# Patient Record
Sex: MALE | Race: WHITE | NOT HISPANIC OR LATINO | Employment: FULL TIME | URBAN - METROPOLITAN AREA
[De-identification: names, ages, dates, MRNs, and addresses within clinical notes are randomized per-mention and may not be internally consistent; named-entity substitution may affect disease eponyms.]

---

## 2017-01-03 DIAGNOSIS — F41.8 SITUATIONAL ANXIETY: ICD-10-CM

## 2017-01-03 RX ORDER — ALPRAZOLAM 0.25 MG/1
0.25 TABLET ORAL NIGHTLY PRN
Qty: 20 TAB | Refills: 0
Start: 2017-01-03

## 2017-01-03 NOTE — TELEPHONE ENCOUNTER
Was the patient seen in the last year in this department? Yes     Does patient have an active prescription for medications requested? No     Received Request Via: Pharmacy    '

## 2017-01-09 ENCOUNTER — HOSPITAL ENCOUNTER (OUTPATIENT)
Dept: LAB | Facility: MEDICAL CENTER | Age: 57
End: 2017-01-09
Attending: NURSE PRACTITIONER
Payer: COMMERCIAL

## 2017-01-09 DIAGNOSIS — Z95.1 S/P CABG (CORONARY ARTERY BYPASS GRAFT): ICD-10-CM

## 2017-01-09 DIAGNOSIS — E11.69 DYSLIPIDEMIA ASSOCIATED WITH TYPE 2 DIABETES MELLITUS (HCC): ICD-10-CM

## 2017-01-09 DIAGNOSIS — E78.5 DYSLIPIDEMIA ASSOCIATED WITH TYPE 2 DIABETES MELLITUS (HCC): ICD-10-CM

## 2017-01-09 DIAGNOSIS — I25.10 CORONARY ARTERY DISEASE INVOLVING NATIVE CORONARY ARTERY OF NATIVE HEART WITHOUT ANGINA PECTORIS: Chronic | ICD-10-CM

## 2017-01-09 LAB
ALBUMIN SERPL BCP-MCNC: 4.2 G/DL (ref 3.2–4.9)
ALBUMIN/GLOB SERPL: 1.8 G/DL
ALP SERPL-CCNC: 80 U/L (ref 30–99)
ALT SERPL-CCNC: 28 U/L (ref 2–50)
ANION GAP SERPL CALC-SCNC: 5 MMOL/L (ref 0–11.9)
AST SERPL-CCNC: 16 U/L (ref 12–45)
BILIRUB SERPL-MCNC: 0.4 MG/DL (ref 0.1–1.5)
BUN SERPL-MCNC: 9 MG/DL (ref 8–22)
CALCIUM SERPL-MCNC: 9.3 MG/DL (ref 8.5–10.5)
CHLORIDE SERPL-SCNC: 104 MMOL/L (ref 96–112)
CHOLEST SERPL-MCNC: 119 MG/DL (ref 100–199)
CO2 SERPL-SCNC: 29 MMOL/L (ref 20–33)
CREAT SERPL-MCNC: 0.92 MG/DL (ref 0.5–1.4)
ERYTHROCYTE [DISTWIDTH] IN BLOOD BY AUTOMATED COUNT: 41.4 FL (ref 35.9–50)
GLOBULIN SER CALC-MCNC: 2.4 G/DL (ref 1.9–3.5)
GLUCOSE SERPL-MCNC: 155 MG/DL (ref 65–99)
HCT VFR BLD AUTO: 44.6 % (ref 42–52)
HDLC SERPL-MCNC: 35 MG/DL
HGB BLD-MCNC: 14.9 G/DL (ref 14–18)
LDLC SERPL CALC-MCNC: 66 MG/DL
MCH RBC QN AUTO: 30.5 PG (ref 27–33)
MCHC RBC AUTO-ENTMCNC: 33.4 G/DL (ref 33.7–35.3)
MCV RBC AUTO: 91.2 FL (ref 81.4–97.8)
PLATELET # BLD AUTO: 283 K/UL (ref 164–446)
PMV BLD AUTO: 9.3 FL (ref 9–12.9)
POTASSIUM SERPL-SCNC: 4.2 MMOL/L (ref 3.6–5.5)
PROT SERPL-MCNC: 6.6 G/DL (ref 6–8.2)
RBC # BLD AUTO: 4.89 M/UL (ref 4.7–6.1)
SODIUM SERPL-SCNC: 138 MMOL/L (ref 135–145)
TRIGL SERPL-MCNC: 88 MG/DL (ref 0–149)
WBC # BLD AUTO: 7.7 K/UL (ref 4.8–10.8)

## 2017-01-09 PROCEDURE — 85027 COMPLETE CBC AUTOMATED: CPT

## 2017-01-09 PROCEDURE — 36415 COLL VENOUS BLD VENIPUNCTURE: CPT

## 2017-01-09 PROCEDURE — 80053 COMPREHEN METABOLIC PANEL: CPT

## 2017-01-09 PROCEDURE — 80061 LIPID PANEL: CPT

## 2017-01-12 ENCOUNTER — OFFICE VISIT (OUTPATIENT)
Dept: MEDICAL GROUP | Age: 57
End: 2017-01-12
Payer: COMMERCIAL

## 2017-01-12 ENCOUNTER — NON-PROVIDER VISIT (OUTPATIENT)
Dept: CARDIOLOGY | Facility: MEDICAL CENTER | Age: 57
End: 2017-01-12
Payer: COMMERCIAL

## 2017-01-12 VITALS
DIASTOLIC BLOOD PRESSURE: 76 MMHG | BODY MASS INDEX: 27.1 KG/M2 | TEMPERATURE: 97 F | OXYGEN SATURATION: 98 % | HEART RATE: 78 BPM | WEIGHT: 193.6 LBS | SYSTOLIC BLOOD PRESSURE: 112 MMHG | HEIGHT: 71 IN

## 2017-01-12 VITALS — WEIGHT: 191.5 LBS | BODY MASS INDEX: 26.81 KG/M2 | HEIGHT: 71 IN

## 2017-01-12 DIAGNOSIS — Z23 NEED FOR VACCINATION: ICD-10-CM

## 2017-01-12 DIAGNOSIS — Z95.1 S/P CABG (CORONARY ARTERY BYPASS GRAFT): ICD-10-CM

## 2017-01-12 DIAGNOSIS — F41.8 SITUATIONAL ANXIETY: ICD-10-CM

## 2017-01-12 DIAGNOSIS — I25.10 CORONARY ARTERY DISEASE INVOLVING NATIVE CORONARY ARTERY OF NATIVE HEART WITHOUT ANGINA PECTORIS: Chronic | ICD-10-CM

## 2017-01-12 DIAGNOSIS — E78.5 DYSLIPIDEMIA ASSOCIATED WITH TYPE 2 DIABETES MELLITUS (HCC): ICD-10-CM

## 2017-01-12 DIAGNOSIS — E11.69 DYSLIPIDEMIA ASSOCIATED WITH TYPE 2 DIABETES MELLITUS (HCC): ICD-10-CM

## 2017-01-12 PROCEDURE — G0422 INTENS CARDIAC REHAB W/EXERC: HCPCS | Performed by: CLINIC/CENTER

## 2017-01-12 PROCEDURE — 90746 HEPB VACCINE 3 DOSE ADULT IM: CPT | Performed by: PHYSICIAN ASSISTANT

## 2017-01-12 PROCEDURE — G0423 INTENS CARDIAC REHAB NO EXER: HCPCS | Mod: 59 | Performed by: CLINIC/CENTER

## 2017-01-12 PROCEDURE — 90471 IMMUNIZATION ADMIN: CPT | Performed by: PHYSICIAN ASSISTANT

## 2017-01-12 PROCEDURE — 99214 OFFICE O/P EST MOD 30 MIN: CPT | Mod: 25 | Performed by: PHYSICIAN ASSISTANT

## 2017-01-12 RX ORDER — ALPRAZOLAM 0.25 MG/1
0.25 TABLET ORAL NIGHTLY PRN
Qty: 20 TAB | Refills: 0 | Status: SHIPPED
Start: 2017-01-12 | End: 2017-04-13 | Stop reason: SDUPTHER

## 2017-01-12 ASSESSMENT — PATIENT HEALTH QUESTIONNAIRE - PHQ9
SUM OF ALL RESPONSES TO PHQ9 QUESTIONS 1 AND 2: 4
SUM OF ALL RESPONSES TO PHQ QUESTIONS 1-9: 16
SUM OF ALL RESPONSES TO PHQ QUESTIONS 1-9: 16
4. FEELING TIRED OR HAVING LITTLE ENERGY: 2
3. TROUBLE FALLING OR STAYING ASLEEP OR SLEEPING TOO MUCH: 2
5. POOR APPETITE OR OVEREATING: 2
8. MOVING OR SPEAKING SO SLOWLY THAT OTHER PEOPLE COULD HAVE NOTICED. OR THE OPPOSITE, BEING SO FIGETY OR RESTLESS THAT YOU HAVE BEEN MOVING AROUND A LOT MORE THAN USUAL: 2
2. FEELING DOWN, DEPRESSED, IRRITABLE, OR HOPELESS: 2
1. LITTLE INTEREST OR PLEASURE IN DOING THINGS: 2
6. FEELING BAD ABOUT YOURSELF - OR THAT YOU ARE A FAILURE OR HAVE LET YOURSELF OR YOUR FAMILY DOWN: 2
9. THOUGHTS THAT YOU WOULD BE BETTER OFF DEAD, OR OF HURTING YOURSELF: 0
7. TROUBLE CONCENTRATING ON THINGS, SUCH AS READING THE NEWSPAPER OR WATCHING TELEVISION: 2

## 2017-01-12 NOTE — Clinical Note
He is moderately depressed per survey. He feels this is situational. We will reassess at end of program but he may need further assessment from his primary care MD/ PA-KATHIE.

## 2017-01-12 NOTE — MR AVS SNAPSHOT
"        Hu Santacruz   2017 8:10 AM   Office Visit   MRN: [id number]    Department:  [address] [place] [address]   Dept Phone:  [phone]    Description:  Male : 1960   Provider:  Jen Ortiz PA-C           Reason for Visit     Diabetes     Heart Problem Pt states that he had Bypass surgery on 17    Anxiety           Allergies as of 2017     No Known Allergies      You were diagnosed with     Coronary artery disease involving native coronary artery of native heart without angina pectoris   [1452592]       S/P CABG (coronary artery bypass graft)   [004785]       Dyslipidemia associated with type 2 diabetes mellitus (HCC)   [458504]       Situational anxiety   [420684]       Need for vaccination   [171331]         Vital Signs     Blood Pressure Pulse Temperature Height Weight Body Mass Index    112/76 mmHg 78 36.1 °C (97 °F) 1.803 m (5' 10.98\") 87.816 kg (193 lb 9.6 oz) 27.01 kg/m2    Oxygen Saturation Smoking Status                98% Never Smoker           Basic Information     Date Of Birth Sex Race Ethnicity Preferred Language    1960 Male White Non- English      Your appointments     2017  1:00 PM   ORIENTATION with ICR RN   [facility] ([facility])    [address]  [address]  [place] [address] [phone]            Mar 28, 2017  7:45 AM   FOLLOW UP with Sri Olmstead M.D.   [facility] (--)    [address] [place] [address] [address]  [place] [id number] [phone]            2017  8:10 AM   Established Patient with Jen Ortiz PA-C   [facility] [address] [place] [address])    [address]  [place] [id number] [phone]           You will be receiving a confirmation call a few days before your appointment from our automated call confirmation system.              Problem List              ICD-10-CM Priority Class Noted - Resolved    CAD (coronary artery disease), native coronary " artery (Chronic) I25.10 Medium  4/16/2012 - Present    Dyslipidemia associated with type 2 diabetes mellitus (HCC) E11.69, E78.5 Medium  4/16/2012 - Present    Calcifying tendinitis of shoulder M75.30 Low  4/2/2013 - Present    Bicipital tenosynovitis M75.20 Low  4/2/2013 - Present    Erectile dysfunction N52.9 Low  2/24/2014 - Present    Situational anxiety F41.8 Low  10/11/2016 - Present    S/P CABG (coronary artery bypass graft) Z95.1 Medium  12/20/2016 - Present      Health Maintenance        Date Due Completion Dates    IMM HEP B VACCINE (1 of 3 - Primary Series) 1960 ---    RETINAL SCREENING 1/26/2016 1/26/2015    A1C SCREENING 6/5/2017 12/5/2016, 10/11/2016, 5/24/2016, 12/17/2015, 9/24/2015, 4/29/2015, 12/8/2014, 6/2/2014, 2/10/2014, 2/16/2013    URINE ACR / MICROALBUMIN 10/7/2017 10/7/2016, 9/24/2015, 2/10/2014, 2/16/2013    DIABETES MONOFILAMTENT / LE EXAM 10/11/2017 10/11/2016, 5/6/2015 (Done), 2/10/2014 (Done)    Override on 5/6/2015: Done    Override on 2/10/2014: Done    FASTING LIPID PROFILE 1/9/2018 1/9/2017, 10/7/2016, 9/24/2015, 12/8/2014, 2/10/2014, 2/16/2013    SERUM CREATININE 1/9/2018 1/9/2017, 12/11/2016, 12/10/2016, 12/9/2016, 12/8/2016, 12/7/2016, 12/5/2016, 11/19/2016, 11/15/2016, 10/7/2016, 5/24/2016, 12/17/2015, 9/24/2015, 4/29/2015, 12/8/2014, 2/10/2014 (Done), 2/10/2014, 3/14/2013, 2/16/2013    Override on 2/10/2014: Done    IMM DTaP/Tdap/Td Vaccine (2 - Td) 2/10/2024 2/10/2014    COLONOSCOPY 7/21/2024 7/21/2014            Current Immunizations     13-VALENT PCV PREVNAR 12/31/2014    Hepatitis B Vaccine Recombivax (Adol/Adult)  Incomplete    Influenza Vaccine Quad Inj (Preserved) 10/11/2016 12:20 PM, 12/31/2014    Pneumococcal polysaccharide vaccine (PPSV-23) 11/19/2016  8:59 AM    Tdap Vaccine 2/10/2014 10:11 AM      Below and/or attached are the medications your provider expects you to take. Review all of your home medications and newly ordered medications with your provider  and/or pharmacist. Follow medication instructions as directed by your provider and/or pharmacist. Please keep your medication list with you and share with your provider. Update the information when medications are discontinued, doses are changed, or new medications (including over-the-counter products) are added; and carry medication information at all times in the event of emergency situations     Allergies:  No Known Allergies          Medications  Valid as of: January 12, 2017 -  8:53 AM    Generic Name Brand Name Tablet Size Instructions for use    ALPRAZolam (Tab) XANAX 0.25 MG Take 1 Tab by mouth at bedtime as needed for Anxiety.        Aspirin (Tablet Delayed Response) ECOTRIN 81 MG Take 81 mg by mouth every day.        Cholecalciferol (Tab) cholecalciferol 1000 UNIT Take 1,000 Units by mouth 2 Times a Day.        Clopidogrel Bisulfate (Tab) PLAVIX 75 MG Take 1 Tab by mouth every day.        Cyclobenzaprine HCl (Tab) FLEXERIL 10 MG Take 10 mg by mouth 1 time daily as needed for Moderate Pain or Muscle Spasms. Pt is taking Roxicodone for post surgery pain and is not taking this yet since DC from CABG.  But he has it.        Diphenhydramine-APAP (sleep) (Tab) Diphenhydramine-APAP (sleep)  MG Take 2 Tabs by mouth 1 time daily as needed (sleep).        Dulaglutide (Solution Pen-injector) Dulaglutide 1.5 MG/0.5ML Inject 1 Each as instructed every 7 days. On Mon        Glimepiride (Tab) AMARYL 2 MG Take 2 mg by mouth every bedtime.        MetFORMIN HCl (Tab) GLUCOPHAGE 1000 MG Take 1,000 mg by mouth 2 times a day, with meals.        Metoprolol Tartrate (Tab) LOPRESSOR 25 MG Take 0.5 Tabs by mouth 2 times a day.        Rosuvastatin Calcium (Tab) CRESTOR 20 MG Take 1 Tab by mouth every bedtime.        .                 Medicines prescribed today were sent to:     [facility] [place] - [address]    [address] [place] [place] [address]    Phone: [phone] Fax: [phone]    [place] [address]  Hours?: No    [facility] - [place], [place] - [address] TO REGISTERED [facility] [address]    [address] [facility] [address]    Phone: [phone] Fax: [phone]    Open 24 Hours?: No      Medication refill instructions:       If your prescription bottle indicates you have medication refills left, it is not necessary to call your provider’s office. Please contact your pharmacy and they will refill your medication.    If your prescription bottle indicates you do not have any refills left, you may request refills at any time through one of the following ways: The online Firebase system (except Urgent Care), by calling your provider’s office, or by asking your pharmacy to contact your provider’s office with a refill request. Medication refills are processed only during regular business hours and may not be available until the next business day. Your provider may request additional information or to have a follow-up visit with you prior to refilling your medication.   *Please Note: Medication refills are assigned a new Rx number when refilled electronically. Your pharmacy may indicate that no refills were authorized even though a new prescription for the same medication is available at the pharmacy. Please request the medicine by name with the pharmacy before contacting your provider for a refill.        Other Notes About Your Plan     Social History: Adopted           Firebase Access Code: Activation code not generated  Current Firebase Status: Active

## 2017-01-12 NOTE — PROGRESS NOTES
Subjective:     Chief Complaint   Patient presents with   • Diabetes   • Heart Problem     Pt states that he had Bypass surgery on 1/16/17   • Anxiety     Hu Santacruz is a 56 y.o. male here today for evaluation and management of:    Coronary artery disease involving native coronary artery of native heart without angina pectoris/S/P CABG (coronary artery bypass graft)  Recent bypass-- feeling like he is recovering well.   Starts cardiac rehab today.  Feeling well.  Cardiology discontinued enalapril--questionable hypotension while taking it.  Tolerating ASA and Plavix.     Type 2 diabetes mellitus   Stable. Currently taking metformin 1000 mg BID, glimepiride 2 mg daily, and truliciy 1.5 mg/0.5 mg weekly as directed.  Denies side effects. Very rare hypoglycemic events--catches early  He is monitoring blood sugar regularly at home. Fasting and random blood [id number]. One to two times daily.  Reports diet is fair--plans to make improvements.  He is not exercising regularly--starts cardiac rehab today.  Last eye exam: 1/2015--questionable in 2016  Denies polyuria, polydipsia, blurred vision, or neuropathies.  Cardiology d/c'd Acei.  Has not followed up with new endo yet.    Cholesterol  Currently taking Crestor 20 mg HS as directed.  Denies side effects--no myalgias or abdominal pain.  He is taking ASA and Plavix daily.  He denies dizziness, claudication, or chest pain.    Situational anxiety  Requesting refill on Xanax.   Reports coping better with loss of mom, however, finds anxious after surgery.   Taking less than  prescribed. No longer on pain medications.   Denies depressed mood.   Denies SI/HI. (Denies wishing to be dead, thinking about death, intent to commit suicide)    ROS   Denies any recent fevers or chills.   No nausea or vomiting. No diarrhea.   No chest pains or shortness of breath.   No lower extremity edema.    No Known Allergies    Current medicines (including changes today)  Current  Outpatient Prescriptions   Medication Sig Dispense Refill   • alprazolam (XANAX) 0.25 MG Tab Take 1 Tab by mouth at bedtime as needed for Anxiety. 20 Tab 0   • rosuvastatin (CRESTOR) 20 MG Tab Take 1 Tab by mouth every bedtime. 90 Tab 3   • metoprolol (LOPRESSOR) 25 MG Tab Take 0.5 Tabs by mouth 2 times a day. 90 Tab 3   • Diphenhydramine-APAP, sleep, (TYLENOL PM EXTRA STRENGTH)  MG Tab Take 2 Tabs by mouth 1 time daily as needed (sleep).     • cyclobenzaprine (FLEXERIL) 10 MG Tab Take 10 mg by mouth 1 time daily as needed for Moderate Pain or Muscle Spasms. Pt is taking Roxicodone for post surgery pain and is not taking this yet since DC from CABG.  But he has it.     • clopidogrel (PLAVIX) 75 MG Tab Take 1 Tab by mouth every day. 30 Tab 2   • aspirin EC (ECOTRIN) 81 MG Tablet Delayed Response Take 81 mg by mouth every day.     • metformin (GLUCOPHAGE) 1000 MG tablet Take 1,000 mg by mouth 2 times a day, with meals.     • Dulaglutide (TRULICITY) 1.5 MG/0.5ML Solution Pen-injector Inject 1 Each as instructed every 7 days. On Mon     • glimepiride (AMARYL) 2 MG Tab Take 2 mg by mouth every bedtime.     • vitamin D (CHOLECALCIFEROL) 1000 UNIT TABS Take 1,000 Units by mouth 2 Times a Day.       No current facility-administered medications for this visit.       Patient Active Problem List    Diagnosis Date Noted   • S/P CABG (coronary artery bypass graft) 12/20/2016     Priority: Medium   • CAD (coronary artery disease), native coronary artery 04/16/2012     Priority: Medium   • Dyslipidemia associated with type 2 diabetes mellitus (HCC) 04/16/2012     Priority: Medium   • Situational anxiety 10/11/2016     Priority: Low   • Erectile dysfunction 02/24/2014     Priority: Low   • Calcifying tendinitis of shoulder 04/02/2013     Priority: Low   • Bicipital tenosynovitis 04/02/2013     Priority: Low       Family History   Problem Relation Age of Onset   • Adopted: Yes   • Family history unknown: Yes           "Objective:     Blood pressure 112/76, pulse 78, temperature 36.1 °C (97 °F), height 1.803 m (5' 10.98\"), weight 87.816 kg (193 lb 9.6 oz), SpO2 98 %. Body mass index is 27.01 kg/(m^2).     Physical Exam:  Gen: Well developed, well nourished in no acute distress.   Skin: Pink, warm, and dry  HEENT: conjunctiva non-injected, sclera non-icteric. EOMs intact.   Neck: Supple, trachea midline. No adenopathy or masses in the neck or supraclavicular regions.  Lungs: Effort is normal. Clear to auscultation bilaterally with good excursion.  CV: regular rate and rhythm.  Abdomen: soft, nontender, + BS. No HSM.    Ext: no edema, color normal, vascularity normal, temperature normal  Alert and oriented Eye contact is good, speech goal directed, affect calm    Lab Results   Component Value Date/Time    GLYCOHEMOGLOBIN 6.3* 12/05/2016 01:20 PM          Assessment and Plan:   The following treatment plan was discussed:     1. Coronary artery disease involving native coronary artery of native heart without angina pectoris - Stable. Continue current medicines. Monitor labs regularly. Follow-up with specialist as directed.    2. S/P CABG (coronary artery bypass graft) - Stable. Continue current medicines. Monitor labs regularly. Follow-up with specialist as directed.    3. Dyslipidemia associated with type 2 diabetes mellitus (HCC) -Stable. Continue current medicines. Monitor labs regularly.  - he will schedule follow-up with new endo.  HEPATITIS B VACCINE ADULT IM   4. Situational anxiety - refilled Xanax after reviweing . Advised limited use. Not to be mixed with ETOH or opiates.   Patient was counseled on risk of cognitive impairment and effects on driving and safety.  - will not fill long term. Pt verbalizes understanding this is last fill. alprazolam (XANAX) 0.25 MG Tab   5. Need for vaccination - Start Hep B series with  DM.   VIS given. Informed consent obtained. Vaccine administered in office.   HEPATITIS B VACCINE ADULT IM "     - HM: Due for retinal exam--declines POCT, will complete with his optometrist.  -Any change or worsening of signs or symptoms, patient encouraged to follow-up or report to emergency room for further evaluation. Patient understands and agrees.    Followup: Return in about 3 months (around 4/12/2017) for follow-up on cardiology/endo consults and anxiety.

## 2017-01-13 ENCOUNTER — TELEPHONE (OUTPATIENT)
Dept: CARDIOLOGY | Facility: MEDICAL CENTER | Age: 57
End: 2017-01-13

## 2017-01-13 NOTE — TELEPHONE ENCOUNTER
----- Message from HELENA Callejas sent at 1/10/2017  3:49 PM PST -----  Labs look great except blood sugar is elevated- needs FU with PCP regarding this. SC

## 2017-01-13 NOTE — TELEPHONE ENCOUNTER
Discussed lab results with pt. He has started ICR and will work on lifestyle modifications in rehab.

## 2017-01-14 NOTE — PROGRESS NOTES
Date: 1/14/2017    Initial Individual Treatment Plan review for the [facility] [place], operated by [facility] Intensive Cardiac [facility] (ICR) Program.    Hu Santacruz, 56 y.o. male, with qualifying diagnosis/diagnoses of S/P CABG. Co-morbid conditions include Hypertension, Hyperlipidemia, Diabetes, Stress, Age, Male > 45     Primary Care Provider: Jen Ortiz PA-C    Heart Specialist (s): CHARISSE Olmstead    Patient is just now enrolling in ICR.    Patient is an excellent candidate for the [facility] ([facility]) [facility].    I will review the Individual Treatment Plan again at 30 day post-initiation of ICR.    Galileo Castellon MD, [facility]  , [facility] ([facility]CR) [facility]

## 2017-01-16 ENCOUNTER — NON-PROVIDER VISIT (OUTPATIENT)
Dept: CARDIOLOGY | Facility: MEDICAL CENTER | Age: 57
End: 2017-01-16
Payer: COMMERCIAL

## 2017-01-16 VITALS
HEART RATE: 89 BPM | DIASTOLIC BLOOD PRESSURE: 67 MMHG | BODY MASS INDEX: 26.74 KG/M2 | SYSTOLIC BLOOD PRESSURE: 109 MMHG | HEIGHT: 71 IN | WEIGHT: 191 LBS

## 2017-01-16 DIAGNOSIS — Z95.1 S/P CABG (CORONARY ARTERY BYPASS GRAFT): ICD-10-CM

## 2017-01-16 PROCEDURE — G0423 INTENS CARDIAC REHAB NO EXER: HCPCS | Mod: 59 | Performed by: CLINIC/CENTER

## 2017-01-16 PROCEDURE — G0422 INTENS CARDIAC REHAB W/EXERC: HCPCS | Performed by: CLINIC/CENTER

## 2017-01-18 ENCOUNTER — NON-PROVIDER VISIT (OUTPATIENT)
Dept: CARDIOLOGY | Facility: MEDICAL CENTER | Age: 57
End: 2017-01-18
Payer: COMMERCIAL

## 2017-01-18 VITALS
HEIGHT: 71 IN | HEART RATE: 93 BPM | DIASTOLIC BLOOD PRESSURE: 65 MMHG | SYSTOLIC BLOOD PRESSURE: 123 MMHG | WEIGHT: 190 LBS | BODY MASS INDEX: 26.6 KG/M2

## 2017-01-18 DIAGNOSIS — Z95.1 S/P CABG (CORONARY ARTERY BYPASS GRAFT): ICD-10-CM

## 2017-01-18 PROCEDURE — G0422 INTENS CARDIAC REHAB W/EXERC: HCPCS | Performed by: CLINIC/CENTER

## 2017-01-18 PROCEDURE — G0423 INTENS CARDIAC REHAB NO EXER: HCPCS | Mod: 59 | Performed by: CLINIC/CENTER

## 2017-01-20 ENCOUNTER — NON-PROVIDER VISIT (OUTPATIENT)
Dept: CARDIOLOGY | Facility: MEDICAL CENTER | Age: 57
End: 2017-01-20
Payer: COMMERCIAL

## 2017-01-20 VITALS
HEIGHT: 71 IN | BODY MASS INDEX: 26.6 KG/M2 | SYSTOLIC BLOOD PRESSURE: 98 MMHG | DIASTOLIC BLOOD PRESSURE: 62 MMHG | WEIGHT: 190 LBS | HEART RATE: 88 BPM

## 2017-01-20 DIAGNOSIS — Z95.1 S/P CABG (CORONARY ARTERY BYPASS GRAFT): ICD-10-CM

## 2017-01-20 PROCEDURE — G0422 INTENS CARDIAC REHAB W/EXERC: HCPCS | Performed by: CLINIC/CENTER

## 2017-01-20 PROCEDURE — G0423 INTENS CARDIAC REHAB NO EXER: HCPCS | Mod: 59 | Performed by: CLINIC/CENTER

## 2017-01-20 NOTE — PROGRESS NOTES
Hu Santacruz attending cooking school.  Today  prepared quinoa and spice mix.    Patient received handouts and nutrition information regarding the specific recipes.

## 2017-01-27 ENCOUNTER — NON-PROVIDER VISIT (OUTPATIENT)
Dept: CARDIOLOGY | Facility: MEDICAL CENTER | Age: 57
End: 2017-01-27
Payer: COMMERCIAL

## 2017-01-27 VITALS
HEART RATE: 95 BPM | HEIGHT: 71 IN | WEIGHT: 188.5 LBS | BODY MASS INDEX: 26.39 KG/M2 | DIASTOLIC BLOOD PRESSURE: 72 MMHG | SYSTOLIC BLOOD PRESSURE: 107 MMHG

## 2017-01-27 DIAGNOSIS — Z95.1 S/P CABG (CORONARY ARTERY BYPASS GRAFT): ICD-10-CM

## 2017-01-27 PROCEDURE — G0422 INTENS CARDIAC REHAB W/EXERC: HCPCS | Performed by: CLINIC/CENTER

## 2017-01-27 PROCEDURE — G0423 INTENS CARDIAC REHAB NO EXER: HCPCS | Mod: 59 | Performed by: CLINIC/CENTER

## 2017-01-27 NOTE — PROGRESS NOTES
Hu Santacruz attending cooking school.  Today  prepared oatmeal supreme.    Patient received handouts and nutrition information regarding the specific recipes.

## 2017-02-06 ENCOUNTER — OFFICE VISIT (OUTPATIENT)
Dept: ENDOCRINOLOGY | Facility: MEDICAL CENTER | Age: 57
End: 2017-02-06
Payer: COMMERCIAL

## 2017-02-06 ENCOUNTER — APPOINTMENT (OUTPATIENT)
Dept: CARDIOLOGY | Facility: MEDICAL CENTER | Age: 57
End: 2017-02-06
Payer: COMMERCIAL

## 2017-02-06 VITALS
HEART RATE: 92 BPM | OXYGEN SATURATION: 95 % | BODY MASS INDEX: 26.74 KG/M2 | WEIGHT: 191 LBS | HEIGHT: 71 IN | SYSTOLIC BLOOD PRESSURE: 102 MMHG | DIASTOLIC BLOOD PRESSURE: 70 MMHG

## 2017-02-06 DIAGNOSIS — I25.10 CORONARY ARTERY DISEASE INVOLVING NATIVE CORONARY ARTERY OF NATIVE HEART WITHOUT ANGINA PECTORIS: Chronic | ICD-10-CM

## 2017-02-06 DIAGNOSIS — E11.69 TYPE 2 DIABETES MELLITUS WITH HYPERLIPIDEMIA (HCC): ICD-10-CM

## 2017-02-06 DIAGNOSIS — E78.5 DYSLIPIDEMIA ASSOCIATED WITH TYPE 2 DIABETES MELLITUS (HCC): ICD-10-CM

## 2017-02-06 DIAGNOSIS — R53.83 OTHER FATIGUE: ICD-10-CM

## 2017-02-06 DIAGNOSIS — E11.69 DYSLIPIDEMIA ASSOCIATED WITH TYPE 2 DIABETES MELLITUS (HCC): ICD-10-CM

## 2017-02-06 DIAGNOSIS — E78.5 TYPE 2 DIABETES MELLITUS WITH HYPERLIPIDEMIA (HCC): ICD-10-CM

## 2017-02-06 PROBLEM — E11.9 DM (DIABETES MELLITUS) (HCC): Status: ACTIVE | Noted: 2017-02-06

## 2017-02-06 LAB
HBA1C MFR BLD: 5.7 % (ref ?–5.8)
INT CON NEG: NORMAL
INT CON POS: NORMAL

## 2017-02-06 PROCEDURE — 99214 OFFICE O/P EST MOD 30 MIN: CPT | Mod: 25 | Performed by: PHYSICIAN ASSISTANT

## 2017-02-06 PROCEDURE — 83036 HEMOGLOBIN GLYCOSYLATED A1C: CPT | Performed by: PHYSICIAN ASSISTANT

## 2017-02-06 NOTE — MR AVS SNAPSHOT
"        Hu Santacruz   2017 2:40 PM   Office Visit   MRN: [id number]    Department:  [facility]   Dept Phone:  [phone]    Description:  Male : 1960   Provider:  Kelvin Turcios PA-C           Reason for Visit     Establish Care new to [facility]      Allergies as of 2017     No Known Allergies      You were diagnosed with     Dyslipidemia associated with type 2 diabetes mellitus (CMS-[facility])   [317420]       Coronary artery disease involving native coronary artery of native heart without angina pectoris   [8595571]       Type 2 diabetes mellitus with hyperlipidemia (CMS-[facility])   [2026557]         Vital Signs     Blood Pressure Pulse Height Weight Body Mass Index Oxygen Saturation    102/70 mmHg 92 1.803 m (5' 10.98\") 86.637 kg (191 lb) 26.65 kg/m2 95%    Smoking Status                   Never Smoker            Basic Information     Date Of Birth Sex Race Ethnicity Preferred Language    1960 Male White Non- English      Your appointments     2017  8:00 AM   GROUP EDUCATION with ICR EDUCATION RM   [facility] ([facility])    [address]  [place] [address]   [phone]            2017  9:00 AM   EXERCISE with ICR RN   [facility] ([facility])    [address]  [place] [address]   [phone]            Feb 10, 2017  8:00 AM   GROUP EDUCATION with ICR EDUCATION RM   [facility] ([facility])    [address]  [place] [address]   [phone]            Feb 10, 2017  9:00 AM   EXERCISE with ICR RN   [facility] ([facility])    [address]  [place] [address]   [phone]            2017  8:00 AM   GROUP EDUCATION with ICR EDUCATION RM   [facility] ([facility])    [address]  [facility] [place] [address]   [phone]            2017  9:00 AM   EXERCISE with " ICR RN   [facility] ([facility])    [address]  [place] [address]   [phone]            Feb 15, 2017  8:00 AM   GROUP EDUCATION with ICR EDUCATION RM   [facility] ([facility])    [address]  [place] [address]   [phone]            Feb 15, 2017  9:00 AM   EXERCISE with ICR RN   [facility] ([facility])    [address]  [place] [address]   [phone]            Feb 17, 2017  8:00 AM   GROUP EDUCATION with ICR EDUCATION RM   [facility] ([facility])    [address]  [address] [place] [address]   [phone]            Feb 17, 2017  9:00 AM   EXERCISE with ICR RN   [facility] ([facility])    [address] [place] [address]   [phone]            Feb 22, 2017  8:00 AM   GROUP EDUCATION with ICR EDUCATION RM   [facility] ([facility])    [address]  [address] [place] [address]   [phone]            Feb 22, 2017  9:00 AM   EXERCISE with ICR RN   [facility] ([facility])    [address]  [address] [place] [address]   [phone]            Feb 24, 2017  8:00 AM   GROUP EDUCATION with ICR EDUCATION RM   [facility] ([facility])    [address]  [address] [place] [address]   [phone]            Feb 24, 2017  9:00 AM   EXERCISE with ICR RN   [facility] ([facility])    [address]  [address] [place] [address]   [phone]            Feb 27, 2017  8:00 AM   GROUP EDUCATION with ICR EDUCATION RM   [facility] ([facility])    [address]  [place] [address]   [phone]            Feb 27, 2017  9:00 AM   EXERCISE with LEÓN RN   [facility] ([facility])    [address]  [address]  [place] [address] [phone]             Mar 01, 2017  8:00 AM   GROUP EDUCATION with ICR EDUCATION RM   [facility] ([facility])    [address]  [place] [address]   [phone]            Mar 01, 2017  9:00 AM   EXERCISE with ICR RN   [facility] ([facility])    [address]  [place] [address]   [phone]            Mar 03, 2017  8:00 AM   GROUP EDUCATION with ICR EDUCATION RM   [facility] ([facility])    [address]  [place] [address]   [phone]            Mar 03, 2017  9:00 AM   EXERCISE with ICR RN   [facility] ([facility])    [address]  [place] [address]   [phone]            Mar 06, 2017  8:00 AM   GROUP EDUCATION with ICR EDUCATION RM   [facility] ([facility])    [address]  [place] [address]   [phone]            Mar 06, 2017  9:00 AM   EXERCISE with ICR RN   [facility] ([facility])    [address]  [place] [address]   [phone]            Mar 08, 2017  8:00 AM   GROUP EDUCATION with ICR EDUCATION RM   [facility] ([facility])    [address]  [place] [address]   [phone]            Mar 08, 2017  9:00 AM   EXERCISE with ICR RN   [facility] ([facility])    [address]  [place] [address]   [phone]            Mar 10, 2017  8:00 AM   GROUP EDUCATION with ICR EDUCATION RM   [facility] ([facility])    [address]  [place] [address]   [phone]            Mar 10, 2017  9:00 AM   EXERCISE with ICR RN   [facility] ([facility])    [address]  [place] [address]   [phone]            Mar 13, 2017  8:00 AM   GROUP EDUCATION with [facility] EDUCATION [facility] ([facility])    [address]  [place] [address]   [phone]            Mar 13, 2017  9:00 AM   EXERCISE with ICR RN   [facility] ([facility])    [address]  [place] [address]   [phone]            Mar 15, 2017  8:00 AM   GROUP EDUCATION with ICR EDUCATION RM   [facility] ([facility])    [address]  [place] [address]   [phone]            Mar 15, 2017  9:00 AM   EXERCISE with ICR RN   [facility] ([facility])    [address]  [address] [facility]  [place] [address]   [phone]            Mar 17, 2017  8:00 AM   GROUP EDUCATION with ICR EDUCATION RM   [facility] ([facility])    [address]  [address] [facility]  [place] [address]   [phone]            Mar 17, 2017  9:00 AM   EXERCISE with ICR RN   [facility] ([facility])    [address]  [address] [facility]  [place] [address]   [phone]            Mar 20, 2017  8:00 AM   GROUP EDUCATION with ICR EDUCATION RM   [facility] ([facility])    [address]  [address] [facility]  [place] [address]   [phone]            Mar 20, 2017  9:00 AM   EXERCISE with ICR RN   [facility] ([facility])    [address]  [address] [place] [address]   [phone]            Mar 22, 2017  8:00 AM   GROUP EDUCATION with ICR EDUCATION RM   [facility] ([facility])    [address]  [address] [place] [address]   [phone]            Mar 22, 2017  9:00 AM   EXERCISE with ICR RN   [facility] ([facility])    [address]  [address] [facility]  [place] [address]   [phone]            Mar 24, 2017  8:00 AM   GROUP EDUCATION with ICR EDUCATION RM   [facility] ([facility])    [address]  [address] [place] [address]   [phone]            Mar 24, 2017  9:00 AM   EXERCISE with ICR RN   [facility] ([facility])    [address]  [place] [address]   [phone]            Mar 27, 2017  8:00 AM   GROUP EDUCATION with ICR EDUCATION RM   [facility] ([facility])    [address]  [place] [address]   [phone]            Mar 27, 2017  9:00 AM   EXERCISE with ICR RN   [facility] ([facility])    [address]  [place] [address]   [phone]            Mar 28, 2017  7:45 AM   FOLLOW UP with Sri Olmstead M.D.   [facility] (--)    [address]  [place] [address]   [phone]            Mar 29, 2017  8:00 AM   GROUP EDUCATION with ICR EDUCATION RM   [facility] ([facility])    [address]  [place] [address]   [phone]            Mar 29, 2017  9:00 AM   EXERCISE with ICR RN   [facility] ([facility])    [address]  [place] [address]   [phone]            Mar 31, 2017  8:00 AM   GROUP EDUCATION with ICR EDUCATION RM   [facility] ([facility])    [address]  [place] [address]   [phone]            Mar 31, 2017  9:00 AM   EXERCISE with ICR RN   [facility] ([facility])    [address]  [place] [address]   [phone]            Apr 03, 2017  8:00 AM   GROUP EDUCATION with ICR EDUCATION RM   [facility] ([facility])    [address]  [place] [address]   [phone]            Apr 03, 2017  9:00 AM   EXERCISE with ICR RN   [facility] ([facility])    [address]  [place] [address]   [phone]            Apr 05, 2017  8:00 AM   GROUP EDUCATION with ICR EDUCATION RM   [facility] ([facility])    [address]  [facility] [address] [phone]            Apr  05, 2017  9:00 AM   EXERCISE with ICR RN   [facility] ([facility])    [address]  [place] [address] [phone]            Apr 13, 2017  8:10 AM   Established Patient with Jen Ortiz PA-C   [facility] [address] ([facility]    [address]  [place] [address] [phone]           You will be receiving a confirmation call a few days before your appointment from our automated call confirmation system.            Aug 07, 2017  9:00 AM   Established Patient with [facility] ([facility])    [address]  [place] [address] [phone]           You will be receiving a confirmation call a few days before your appointment from our automated call confirmation system.              Problem List              ICD-10-CM Priority Class Noted - Resolved    CAD (coronary artery disease), native coronary artery (Chronic) I25.10 Medium  4/16/2012 - Present    Dyslipidemia associated with type 2 diabetes mellitus (CMS-HCC) E11.69, E78.5 Medium  4/16/2012 - Present    Calcifying tendinitis of shoulder M75.30 Low  4/2/2013 - Present    Bicipital tenosynovitis M75.20 Low  4/2/2013 - Present    Erectile dysfunction N52.9 Low  2/24/2014 - Present    Situational anxiety F41.8 Low  10/11/2016 - Present    S/P CABG (coronary artery bypass graft) Z95.1 Medium  12/20/2016 - Present    DM (diabetes mellitus) (CMS-HCC) E11.9   2/6/2017 - Present    Type 2 diabetes mellitus with hyperlipidemia (CMS-HCC) E11.69, E78.5   2/6/2017 - Present      Health Maintenance        Date Due Completion Dates    RETINAL SCREENING 1/26/2016 1/26/2015    IMM HEP B VACCINE (2 of 3 - Primary Series) 2/9/2017 1/12/2017    A1C SCREENING 6/5/2017 12/5/2016, 10/11/2016, 5/24/2016, 12/17/2015, 9/24/2015, 4/29/2015, 12/8/2014, 6/2/2014, 2/10/2014, 2/16/2013    URINE ACR / MICROALBUMIN 10/7/2017 10/7/2016, 9/24/2015, 2/10/2014,  2/16/2013    DIABETES MONOFILAMENT / LE EXAM 10/11/2017 10/11/2016, 5/6/2015 (Done), 2/10/2014 (Done)    Override on 5/6/2015: Done    Override on 2/10/2014: Done    FASTING LIPID PROFILE 1/9/2018 1/9/2017, 10/7/2016, 9/24/2015, 12/8/2014, 2/10/2014, 2/16/2013    SERUM CREATININE 1/9/2018 1/9/2017, 12/11/2016, 12/10/2016, 12/9/2016, 12/8/2016, 12/7/2016, 12/5/2016, 11/19/2016, 11/15/2016, 10/7/2016, 5/24/2016, 12/17/2015, 9/24/2015, 4/29/2015, 12/8/2014, 2/10/2014 (Done), 2/10/2014, 3/14/2013, 2/16/2013    Override on 2/10/2014: Done    IMM DTaP/Tdap/Td Vaccine (2 - Td) 2/10/2024 2/10/2014    COLONOSCOPY 7/21/2024 7/21/2014            Current Immunizations     13-VALENT PCV PREVNAR 12/31/2014    Hepatitis B Vaccine Recombivax (Adol/Adult) 1/12/2017  9:16 AM    Influenza Vaccine Quad Inj (Preserved) 10/11/2016 12:20 PM, 12/31/2014    Pneumococcal polysaccharide vaccine (PPSV-23) 11/19/2016  8:59 AM    Tdap Vaccine 2/10/2014 10:11 AM      Below and/or attached are the medications your provider expects you to take. Review all of your home medications and newly ordered medications with your provider and/or pharmacist. Follow medication instructions as directed by your provider and/or pharmacist. Please keep your medication list with you and share with your provider. Update the information when medications are discontinued, doses are changed, or new medications (including over-the-counter products) are added; and carry medication information at all times in the event of emergency situations     Allergies:  No Known Allergies          Medications  Valid as of: February 06, 2017 -  3:26 PM    Generic Name Brand Name Tablet Size Instructions for use    ALPRAZolam (Tab) XANAX 0.25 MG Take 1 Tab by mouth at bedtime as needed for Anxiety.        Aspirin (Tablet Delayed Response) ECOTRIN 81 MG Take 81 mg by mouth every day.        Cholecalciferol (Tab) cholecalciferol 1000 UNIT Take 1,000 Units by mouth 2 Times a Day.         Clopidogrel Bisulfate (Tab) PLAVIX 75 MG Take 1 Tab by mouth every day.        Cyclobenzaprine HCl (Tab) FLEXERIL 10 MG Take 10 mg by mouth 1 time daily as needed for Moderate Pain or Muscle Spasms. Pt is taking Roxicodone for post surgery pain and is not taking this yet since DC from CABG.  But he has it.        Diphenhydramine-APAP (sleep) (Tab) Diphenhydramine-APAP (sleep)  MG Take 2 Tabs by mouth 1 time daily as needed (sleep).        Dulaglutide (Solution Pen-injector) Dulaglutide 1.5 MG/0.5ML Inject 1 Each as instructed every 7 days. On Mon        MetFORMIN HCl (Tab) GLUCOPHAGE 1000 MG Take 1,000 mg by mouth 2 times a day, with meals.        Metoprolol Tartrate (Tab) LOPRESSOR 25 MG Take 0.5 Tabs by mouth 2 times a day.        Rosuvastatin Calcium (Tab) CRESTOR 20 MG Take 1 Tab by mouth every bedtime.        .                 Medicines prescribed today were sent to:     [facility] [address] [place] [address] [place] - [address]    [address] [facility] [address]    Phone: [phone] Fax: [phone]    Open 24 Hours?: No    [facility] - [place], [place] - [address] AT [address] TO [facility]    [address] [facility] [address]    Phone: [phone] Fax: [phone]    Open 24 Hours?: No      Medication refill instructions:       If your prescription bottle indicates you have medication refills left, it is not necessary to call your provider’s office. Please contact your pharmacy and they will refill your medication.    If your prescription bottle indicates you do not have any refills left, you may request refills at any time through one of the following ways: The online [facility] Portal system (except Urgent Care), by calling your provider’s office, or by asking your pharmacy to contact your provider’s office with a refill request. Medication refills are processed only during regular business hours and may not be available until the next business day. Your  provider may request additional information or to have a follow-up visit with you prior to refilling your medication.   *Please Note: Medication refills are assigned a new Rx number when refilled electronically. Your pharmacy may indicate that no refills were authorized even though a new prescription for the same medication is available at the pharmacy. Please request the medicine by name with the pharmacy before contacting your provider for a refill.        Instructions    Stop Amaryl 2mg green pill at night.         Other Notes About Your Plan     Social History: Adopted           [employer] Access Code: Activation code not generated  Current [employer] Status: Active

## 2017-02-08 ENCOUNTER — NON-PROVIDER VISIT (OUTPATIENT)
Dept: CARDIOLOGY | Facility: MEDICAL CENTER | Age: 57
End: 2017-02-08
Payer: COMMERCIAL

## 2017-02-08 VITALS
HEART RATE: 87 BPM | BODY MASS INDEX: 26.6 KG/M2 | HEIGHT: 71 IN | DIASTOLIC BLOOD PRESSURE: 65 MMHG | SYSTOLIC BLOOD PRESSURE: 92 MMHG | WEIGHT: 190 LBS

## 2017-02-08 DIAGNOSIS — Z95.1 S/P CABG (CORONARY ARTERY BYPASS GRAFT): ICD-10-CM

## 2017-02-08 PROCEDURE — G0422 INTENS CARDIAC REHAB W/EXERC: HCPCS | Performed by: CLINIC/CENTER

## 2017-02-08 PROCEDURE — G0423 INTENS CARDIAC REHAB NO EXER: HCPCS | Mod: 59 | Performed by: CLINIC/CENTER

## 2017-02-08 ASSESSMENT — PAIN SCALES - GENERAL: PAINLEVEL: NO PAIN

## 2017-02-10 ENCOUNTER — NON-PROVIDER VISIT (OUTPATIENT)
Dept: CARDIOLOGY | Facility: MEDICAL CENTER | Age: 57
End: 2017-02-10

## 2017-02-10 DIAGNOSIS — Z95.1 S/P CABG (CORONARY ARTERY BYPASS GRAFT): ICD-10-CM

## 2017-02-10 DIAGNOSIS — I25.10 CORONARY ARTERY DISEASE INVOLVING NATIVE CORONARY ARTERY OF NATIVE HEART WITHOUT ANGINA PECTORIS: Chronic | ICD-10-CM

## 2017-02-13 ENCOUNTER — NON-PROVIDER VISIT (OUTPATIENT)
Dept: CARDIOLOGY | Facility: MEDICAL CENTER | Age: 57
End: 2017-02-13

## 2017-02-15 NOTE — PROGRESS NOTES
Cardiac Rehab Individual Treatment Plan Assessment: 30 day 02/15/2017 Session #     6   MRN: [id number]   Allergies: Review of patient's allergies indicates no known allergies.   Patient Name: Hu Santacruz : 1960 Risk Stratification: High    Diagnoses: S/P CABG  Age: 56 y.o. Physician: Jen Ortiz PA-C    Date of Event: 16 Specialist: Dr. CHARISSE Olmstead M.D.   Risk Factors:  Hypertension, Hyperlipidemia, Diabetes, Stress, Age, Male > 45   Exercise Nutrition Education Psychosocial   Stages of change Stages of change Stages of change Stages of change   Preparation Preparation Preparation Preparation   Fitness Test Lipids Learning Barriers Outcome Survey Tools   DIST:  (NA)  Available: No new Learning Barriers: Vision FP QOL Overall Score:  (NA)   Max HR:  (NA) Date: 17 Family Support PHQ-9:  (NA)   RPE:  (NA) Total:  (NA) Yes Nutrition Screen:  (NA)   SPO2:  (NA) Trig:  (NA) Lives: Spouse Intervention   MET:  (NA) HDL:  (NA) Tobacco Use Behavioral Health Consult: Yes   EF= 45 LDL:  (NA) History   Smoking status   • Never Smoker    Smokeless tobacco   • Never Used      Physician Referral: Yes   Ambulatory Status Diabetes Intervention Identifies Stressors: Yes   Fall Risk Assessed: Yes Diabetes: Yes Smoking Cessation Referral: N/A Drug Intervention: Yes   Exercise Ambulatory Status Assist Devices: None HbA1C: 5.7 % Date: 17 Ind. Education / Counseling: N/A Education   Exercise Prescription Monitors BS at Home: Yes Tobacco Adjunct: N/A Psychosocial Education: Coping Techniques, Positive Support System, S/S Depression   Mode: Treadmill, NuStep, Airdyne, UBE   Frequency: 1-2 x day Random BS: 155 (2017) Education Target Goal   Frequency:  3 days/week Weight Management Healthy Heart Education: Class Schedule Given, Cooking School Attended, Medications Reviewed, Patient Education Binder Provided, Videos Viewed per Shamir, Risk Factors Discussed, Workshops Attended (RD 1:1 Scheduled )  "Assess presence or absence of depression using a valid screening: No (Assessed Pre and Post program )   Duration:  15-35min Weight:  (not here) Target Goal Use Stress Management: Yes   Intensity:  11-13 RPE Height: 180.3 cm (5' 10.98\") Complete Tobacco Cessation: Complete Tobacco Cessation: N/A Adverse Events: Yes   METS:  3.0-5.0   Educate / Review and have understanding of cardiac disease prevention: Educate / Review and have understanding of cardiac disease prevention: Yes Unexpected Events: Yes   Progression:  ^ increments of 1-3 to THR/RPE as tolerated Goal weight: Lose 20lb  Medication Compliance: Yes    THR: THR: 20-30 BPM ^Resting HR History   Alcohol Use   • 3.0 oz/week   • 6 Cans of beer per week     Comment: 2 per week         Angina with Exercise?  Angina with Exercise: No      Resistance Training?  Resistance Training: Yes      Hypertension      Diagnosed with HTN: Yes Intervention      Resting BP:  (Not Here) Dietician Consult/Class: Yes      Peak Ex BP:  (NA) Nurse/Patient Discussion: Yes     Intervention Diabetes Ed Referral: Yes     Home Exercise:  Yes Discuss Maintenance /Wt Loss: Yes     Mode: Walk Attended Cooking School: Yes     Duration: 15 min + 1-2min up to 30min Dietary Goal: 24% fat; low sodium     Frequency: 7 days active  Education     Education Nutrition Education: Healthy Eating, Sodium Reduction, S&S Hypo/Hyper glycemia, Relate Diabetes to CAD     Equipment Orientation, Exercise Safety, S/S to Report, RPE Scale, Warm Up / Cool Down, Physically Active Target Goal     Target Goal LDL-C < 100 if trig. > 200:  No       Start Individual Exercise Rx Yes LDL-C < 70 for high risk patient:  Yes       BP < 140/90 or < 130/80 if DM or CKD Yes Non HDL-C Should be < 130:  Yes       Aerobic activity 30 + min / day 5 days / wk Yes HbA1C < 7%: Yes         BMI < 25: Yes       Notes: Patient has been here once since 1/27/2017 due to a death in the family. We will reassess when he returns.               "

## 2017-02-22 ENCOUNTER — NON-PROVIDER VISIT (OUTPATIENT)
Dept: CARDIOLOGY | Facility: MEDICAL CENTER | Age: 57
End: 2017-02-22
Payer: COMMERCIAL

## 2017-02-22 VITALS
DIASTOLIC BLOOD PRESSURE: 52 MMHG | BODY MASS INDEX: 26.81 KG/M2 | SYSTOLIC BLOOD PRESSURE: 107 MMHG | HEIGHT: 71 IN | HEART RATE: 75 BPM | WEIGHT: 191.5 LBS

## 2017-02-22 DIAGNOSIS — Z95.1 S/P CABG (CORONARY ARTERY BYPASS GRAFT): ICD-10-CM

## 2017-02-22 PROCEDURE — G0423 INTENS CARDIAC REHAB NO EXER: HCPCS | Mod: 59 | Performed by: CLINIC/CENTER

## 2017-02-22 PROCEDURE — G0422 INTENS CARDIAC REHAB W/EXERC: HCPCS | Performed by: CLINIC/CENTER

## 2017-02-22 ASSESSMENT — PAIN SCALES - GENERAL: PAINLEVEL: NO PAIN

## 2017-02-27 ENCOUNTER — TELEPHONE (OUTPATIENT)
Dept: ENDOCRINOLOGY | Facility: MEDICAL CENTER | Age: 57
End: 2017-02-27

## 2017-02-27 ENCOUNTER — SUPERVISING PHYSICIAN REVIEW (OUTPATIENT)
Dept: ENDOCRINOLOGY | Facility: MEDICAL CENTER | Age: 57
End: 2017-02-27

## 2017-02-27 NOTE — PROGRESS NOTES
I have reviewed and agree with history, assessment and plan for office encounter on 2/6/17 with midlevel provider: Aly Turcios.  Face to face encounter/direct observation: No  Suggested changes to plan or follow-up: none   Shailesh Liu M.D.

## 2017-02-28 NOTE — PROGRESS NOTES
Date: 2/27/2017    30 day Individual Treatment Plan review for the [facility] Intensive Cardiac Rehabilitation (ICR) Program.    Hu Santacruz, 56 y.o. male, with qualifying diagnosis/diagnoses of recent Myocardial Infarction, S/P CABG, Stent, Heart Valve Replacement, Chronic Stable Angina, etc.  Co-morbid conditions include Hypertension, Obesity, Smoking history, Dyslipidemia, Diabetes, physical deconditioning, etc.    Primary Care Provider: Jen Ortiz PA-C    Heart Specialist (s): CHARISSE Olmstead    Patient has successfully completed 6 Exercise Sessions, and an appropriate number of corresponding Education Sessions.  Patient is (continues to be) an excellent candidate for the [facility] ([facility]CR) Program.    I will review the Individual Treatment Plan again at 60 day post-initiation of ICR.    Galileo Castellon MD, [facility]  , [facility] ([facility]) [facility]

## 2017-03-10 ENCOUNTER — NON-PROVIDER VISIT (OUTPATIENT)
Dept: CARDIOLOGY | Facility: MEDICAL CENTER | Age: 57
End: 2017-03-10

## 2017-03-11 NOTE — PROGRESS NOTES
Cardiac Rehab Individual Treatment Plan Assessment: Discharge 03/10/2017 Session #     7   MRN: [id number]   Allergies: Review of patient's allergies indicates no known allergies.   Patient Name: Hu Santacruz : 1960 Risk Stratification: High    Diagnoses: S/P CABG Age: 56 y.o. Physician: Jen Ortiz PA-C    Date of Event: 16 Specialist: Dr. CHARISSE Olmstead   Risk Factors:  Hypertension, Hyperlipidemia, Diabetes, Stress, Age, Male > 45   Exercise Nutrition Education Psychosocial   Stages of change Stages of change Stages of change Stages of change   Relapse Relapse Relapse Relapse   Fitness Test Lipids Learning Barriers Outcome Survey Tools   DIST:            Max HR:   Date:   Family Support     RPE:   Total:         SPO2:   Trig:     Intervention   MET:   HDL:   Tobacco Use     EF=   LDL:   History   Smoking status   • Never Smoker    Smokeless tobacco   • Never Used          Ambulatory Status Diabetes Smoking Intervention                       Education   Exercise Prescription                 Education Intervention Target Goal   Frequency:    Weight Management       Duration:      Target Goal     Intensity:      Complete Tobacco Cessation:       METS:      Educate / Review and have understanding of cardiac disease prevention:       Progression:           THR:   History   Alcohol Use   • 3.0 oz/week   • 6 Cans of beer per week     Comment: 2 per week         Angina with Exercise?         Resistance Training?         Hypertension        Intervention                       Intervention       Home Exercise:          Mode:         Duration:         Frequency:   Education     Education         Target Goal     Target Goal LDL-C < 100 if trig. > 200:          Start Individual Exercise Rx   LDL-C < 70 for high risk patient:          BP < 140/90 or < 130/80 if DM or CKD   Non HDL-C Should be < 130:          Aerobic activity 30 + min / day 5 days / wk                     Notes: Patient will be discharged from  Intensive Cardiac Rehab at this time at the request of the patient. Hu stated that since the death of his mother he has too many family obligations to continue to attend his appointments.

## 2017-03-16 DIAGNOSIS — E78.5 DYSLIPIDEMIA: ICD-10-CM

## 2017-03-16 DIAGNOSIS — I25.759 CORONARY ARTERY DISEASE INVOLVING NATIVE ARTERY OF TRANSPLANTED HEART WITH ANGINA PECTORIS (HCC): ICD-10-CM

## 2017-03-16 DIAGNOSIS — I25.811 CORONARY ARTERY DISEASE INVOLVING NATIVE ARTERY OF TRANSPLANTED HEART WITHOUT ANGINA PECTORIS: ICD-10-CM

## 2017-03-16 RX ORDER — CLOPIDOGREL BISULFATE 75 MG/1
75 TABLET ORAL DAILY
Qty: 90 TAB | Refills: 3 | OUTPATIENT
Start: 2017-03-16

## 2017-03-16 RX ORDER — ROSUVASTATIN CALCIUM 20 MG/1
20 TABLET, COATED ORAL
Qty: 90 TAB | Refills: 3 | Status: SHIPPED | OUTPATIENT
Start: 2017-03-16 | End: 2018-01-02 | Stop reason: SDUPTHER

## 2017-03-28 ENCOUNTER — OFFICE VISIT (OUTPATIENT)
Dept: CARDIOLOGY | Facility: MEDICAL CENTER | Age: 57
End: 2017-03-28
Payer: COMMERCIAL

## 2017-03-28 VITALS
HEART RATE: 86 BPM | DIASTOLIC BLOOD PRESSURE: 80 MMHG | HEIGHT: 71 IN | OXYGEN SATURATION: 96 % | BODY MASS INDEX: 26.74 KG/M2 | SYSTOLIC BLOOD PRESSURE: 110 MMHG | WEIGHT: 191 LBS

## 2017-03-28 DIAGNOSIS — I25.10 CORONARY ARTERY DISEASE INVOLVING NATIVE CORONARY ARTERY OF NATIVE HEART WITHOUT ANGINA PECTORIS: Chronic | ICD-10-CM

## 2017-03-28 DIAGNOSIS — Z95.1 S/P CABG (CORONARY ARTERY BYPASS GRAFT): ICD-10-CM

## 2017-03-28 PROCEDURE — 99214 OFFICE O/P EST MOD 30 MIN: CPT | Performed by: INTERNAL MEDICINE

## 2017-03-28 ASSESSMENT — ENCOUNTER SYMPTOMS
WEIGHT LOSS: 0
INSOMNIA: 0
PALPITATIONS: 0
NAUSEA: 0
HEARTBURN: 0
EYES NEGATIVE: 1
SHORTNESS OF BREATH: 0
NERVOUS/ANXIOUS: 0
BRUISES/BLEEDS EASILY: 0
MUSCULOSKELETAL NEGATIVE: 1
DIZZINESS: 0
COUGH: 0
LOSS OF CONSCIOUSNESS: 0

## 2017-03-28 NOTE — PROGRESS NOTES
Subjective:   Hu Santacruz is a 57 y.o. male who presents today in follow-up in regards to his coronary disease and recent open heart surgery    Really feels better  Back to work full time  Enjoys the gym but couldn't find time and money for cardiac rehab  Doesn't like the metoprolol    Past Medical History   Diagnosis Date   • Adjustment disorder 4/16/2012   • Hyperlipidemia 4/16/2012   • DM (diabetes mellitus) (CMS-[facility]) 2008     oral agent   • Old myocardial infarction 2008     cardiologist; Dr. Olmstead   • Pain      right shoulder   • Muscle disorder    • Situational anxiety 10/11/2016   • Snoring    • Cold 2/21/13   • Back pain    • CAD (coronary artery disease), native coronary artery 4/16/2012     CABG X3 (LIMA-LAD, SVG-2nd diag and R-PDA)-12/2016     Past Surgical History   Procedure Laterality Date   • Patella orif  1980     bilateral   • Knee arthroscopy  1983     left   • Dental extraction(s)  1997     wisdom teeth   • Shoulder arthroscopy w/ rotator cuff repair  4/2/2013     Performed by Angelique Romero M.D. at [facility]   • Shoulder decompression arthroscopic  4/2/2013     Performed by Angelique Romero M.D. at [facility]   • Joint injection diagnostic  4/2/2013     Performed by Angelique Romero M.D. at [facility]   • Clavicle distal excision  4/2/2013     Performed by Angelique Romero M.D. at [facility]   • Shoulder arthroscopy w/ bicipital tenodesis repair  4/2/2013     Performed by Angelique Romero M.D. at [facility]   • Stent placement  2008     x3   • Multiple coronary artery bypass endo vein harvest  12/6/2016     Procedure: MULTIPLE CORONARY ARTERY BYPASS ENDO VEIN HARVEST X3 WITH LAITH;  Surgeon: Juan Pablo Schmitz M.D.;  Location: SURG[facility];  Service:    • Cardiac cath       Family History   Problem Relation Age of Onset   • Adopted: Yes   • Heart Failure Neg Hx    • Hyperlipidemia Neg Hx       History   Smoking status   • Never Smoker    Smokeless tobacco   • Never Used     Allergies   Allergen Reactions   • Metoprolol      Outpatient Encounter Prescriptions as of 3/28/2017   Medication Sig Dispense Refill   • rosuvastatin (CRESTOR) 20 MG Tab Take 1 Tab by mouth every bedtime. 90 Tab 3   • Dulaglutide (TRULICITY) 1.5 MG/0.5ML Solution Pen-injector Inject 1 Each as instructed every 7 days. On Mon 4 PEN 8   • metoprolol (LOPRESSOR) 25 MG Tab Take 0.5 Tabs by mouth 2 times a day. 90 Tab 3   • aspirin EC (ECOTRIN) 81 MG Tablet Delayed Response Take 81 mg by mouth every day.     • metformin (GLUCOPHAGE) 1000 MG tablet Take 1,000 mg by mouth 2 times a day, with meals.     • vitamin D (CHOLECALCIFEROL) 1000 UNIT TABS Take 1,000 Units by mouth 2 Times a Day.     • alprazolam (XANAX) 0.25 MG Tab Take 1 Tab by mouth at bedtime as needed for Anxiety. 20 Tab 0   • Diphenhydramine-APAP, sleep, (TYLENOL PM EXTRA STRENGTH)  MG Tab Take 2 Tabs by mouth 1 time daily as needed (sleep).     • cyclobenzaprine (FLEXERIL) 10 MG Tab Take 10 mg by mouth 1 time daily as needed for Moderate Pain or Muscle Spasms. Pt is taking Roxicodone for post surgery pain and is not taking this yet since DC from CABG.  But he has it.     • [DISCONTINUED] clopidogrel (PLAVIX) 75 MG Tab Take 1 Tab by mouth every day. (Patient taking differently: Take 75 mg by mouth every day. Indications: stopped medicaton) 30 Tab 2     No facility-administered encounter medications on file as of 3/28/2017.     Review of Systems   Constitutional: Negative for weight loss and malaise/fatigue.   Eyes: Negative.    Respiratory: Negative for cough and shortness of breath.    Cardiovascular: Negative for chest pain, palpitations and leg swelling.   Gastrointestinal: Negative for heartburn and nausea.   Musculoskeletal: Negative.    Neurological: Negative for dizziness and loss of consciousness.   Endo/Heme/Allergies: Does not bruise/bleed easily.  "  Psychiatric/Behavioral: The patient is not nervous/anxious and does not have insomnia.    All other systems reviewed and are negative.       Objective:   /80 mmHg  Pulse 86  Ht 1.803 m (5' 10.98\")  Wt 86.637 kg (191 lb)  BMI 26.65 kg/m2  SpO2 96%    Physical Exam   Constitutional: He is oriented to person, place, and time. He appears well-developed and well-nourished.   HENT:   Mouth/Throat: Oropharynx is clear and moist.   Eyes: Conjunctivae and EOM are normal.   Neck: No JVD present. No thyroid mass present.   Cardiovascular: Normal rate, regular rhythm, S1 normal, S2 normal and normal pulses.  PMI is not displaced.    No murmur heard.  Pulses:       Carotid pulses are 2+ on the right side, and 2+ on the left side.       Radial pulses are 2+ on the right side, and 2+ on the left side.   No peripheral edema. Nontender sternum   Pulmonary/Chest: Effort normal and breath sounds normal. He has no wheezes. He has no rales.   Abdominal: Normal appearance. He exhibits no abdominal bruit. There is no hepatosplenomegaly.   Musculoskeletal: Normal range of motion. He exhibits no edema.        Thoracic back: He exhibits no tenderness and no spasm.   Neurological: He is alert and oriented to person, place, and time. He exhibits normal muscle tone.   Skin: Skin is warm and dry. No rash noted. No cyanosis. Nails show no clubbing.   Psychiatric: He has a normal mood and affect. His behavior is normal.       Assessment:     1. Coronary artery disease involving native coronary artery of native heart without angina pectoris     2. S/P CABG (coronary artery bypass graft)         Medical Decision Making:  Today's Assessment / Status / Plan:     Coronary disease post CABG. The surgeons left him on the Plavix for 3 months. We looked at his echocardiogram normal and reassuring heart function. No valve disease. He is on a statin and aspirin for life. Intolerant of beta blockers with excellent blood pressure    Discussed diet " weight last at length  Sugars much better follows with endocrine    I'm really quite proud of him, his emotional spirits are better we talked about this as well  RTC 6 months, labs at that point

## 2017-04-13 ENCOUNTER — OFFICE VISIT (OUTPATIENT)
Dept: MEDICAL GROUP | Age: 57
End: 2017-04-13
Payer: COMMERCIAL

## 2017-04-13 VITALS
BODY MASS INDEX: 26.74 KG/M2 | DIASTOLIC BLOOD PRESSURE: 70 MMHG | HEART RATE: 90 BPM | TEMPERATURE: 97.5 F | SYSTOLIC BLOOD PRESSURE: 110 MMHG | WEIGHT: 197.4 LBS | HEIGHT: 72 IN | OXYGEN SATURATION: 98 %

## 2017-04-13 DIAGNOSIS — I25.10 CORONARY ARTERY DISEASE INVOLVING NATIVE CORONARY ARTERY OF NATIVE HEART WITHOUT ANGINA PECTORIS: Chronic | ICD-10-CM

## 2017-04-13 DIAGNOSIS — Z23 NEED FOR VACCINATION: ICD-10-CM

## 2017-04-13 DIAGNOSIS — E11.69 DYSLIPIDEMIA ASSOCIATED WITH TYPE 2 DIABETES MELLITUS (HCC): ICD-10-CM

## 2017-04-13 DIAGNOSIS — F41.8 SITUATIONAL ANXIETY: ICD-10-CM

## 2017-04-13 DIAGNOSIS — E78.5 DYSLIPIDEMIA ASSOCIATED WITH TYPE 2 DIABETES MELLITUS (HCC): ICD-10-CM

## 2017-04-13 PROCEDURE — 90471 IMMUNIZATION ADMIN: CPT | Performed by: PHYSICIAN ASSISTANT

## 2017-04-13 PROCEDURE — 90746 HEPB VACCINE 3 DOSE ADULT IM: CPT | Performed by: PHYSICIAN ASSISTANT

## 2017-04-13 PROCEDURE — 99214 OFFICE O/P EST MOD 30 MIN: CPT | Mod: 25 | Performed by: PHYSICIAN ASSISTANT

## 2017-04-13 RX ORDER — ALPRAZOLAM 0.25 MG/1
0.25 TABLET ORAL NIGHTLY PRN
Qty: 20 TAB | Refills: 0 | Status: SHIPPED
Start: 2017-04-13 | End: 2019-04-15

## 2017-04-13 NOTE — MR AVS SNAPSHOT
"        Hu Santacruz   2017 8:10 AM   Office Visit   MRN: [id number]    Department:  [address] [place] [address]   Dept Phone:  [phone]    Description:  Male : 1960   Provider:  Jen Ortiz PA-C           Reason for Visit     Diabetes Mellitus Follow up on ENDO    Heart Problem Follow up on Cardio    Anxiety Refill on Xanax      Allergies as of 2017     Allergen Noted Reactions    Metoprolol 2017         You were diagnosed with     Coronary artery disease involving native coronary artery of native heart without angina pectoris   [7197447]       Dyslipidemia associated with type 2 diabetes mellitus (CMS-[facility])   [337550]       Situational anxiety   [394302]       Need for vaccination   [589232]         Vital Signs     Blood Pressure Pulse Temperature Height Weight Body Mass Index    110/70 mmHg 90 36.4 °C (97.5 °F) 1.816 m (5' 11.5\") 89.54 kg (197 lb 6.4 oz) 27.15 kg/m2    Oxygen Saturation Smoking Status                98% Never Smoker           Basic Information     Date Of Birth Sex Race Ethnicity Preferred Language    1960 Male White Non- English      Your appointments     2017  8:00 AM   Non Provider 1 with [facility] [address]    [address] [facility] [address] [phone]           You will be receiving a confirmation call a few days before your appointment from our automated call confirmation system.            Aug 07, 2017  9:00 AM   Established Patient with Kelvin Turcios PA-C   [facility]    [address]  [place] [address] [phone]           You will be receiving a confirmation call a few days before your appointment from our automated call confirmation system.            Sep 28, 2017  8:15 AM   FOLLOW UP with Sri Olmstead M.D.   [facility] (--)    [address]  [place] " [address]   [phone]            Oct 17, 2017  8:10 AM   Established Patient with Jne Ortiz PA-C   [facility] [address] ([facility])    [address] [place] [id number] [phone]           You will be receiving a confirmation call a few days before your appointment from our automated call confirmation system.              Problem List              ICD-10-CM Priority Class Noted - Resolved    CAD (coronary artery disease), native coronary artery (Chronic) I25.10 Medium  4/16/2012 - Present    Dyslipidemia associated with type 2 diabetes mellitus (CMS-HCC) E11.69, E78.5 Medium  4/16/2012 - Present    Calcifying tendinitis of shoulder M75.30 Low  4/2/2013 - Present    Bicipital tenosynovitis M75.20 Low  4/2/2013 - Present    Erectile dysfunction N52.9 Low  2/24/2014 - Present    Situational anxiety F41.8 Low  10/11/2016 - Present    S/P CABG (coronary artery bypass graft) Z95.1 Medium  12/20/2016 - Present    Fatigue R53.83   2/6/2017 - Present      Health Maintenance        Date Due Completion Dates    RETINAL SCREENING 1/26/2016 1/26/2015    IMM HEP B VACCINE (2 of 3 - Primary Series) 2/9/2017 1/12/2017    A1C SCREENING 8/6/2017 2/6/2017, 12/5/2016, 10/11/2016, 5/24/2016, 12/17/2015, 9/24/2015, 4/29/2015, 12/8/2014, 6/2/2014, 2/10/2014, 2/16/2013    URINE ACR / MICROALBUMIN 10/7/2017 10/7/2016, 9/24/2015, 2/10/2014, 2/16/2013    DIABETES MONOFILAMENT / LE EXAM 10/11/2017 10/11/2016, 5/6/2015 (Done), 2/10/2014 (Done)    Override on 5/6/2015: Done    Override on 2/10/2014: Done    FASTING LIPID PROFILE 1/9/2018 1/9/2017, 10/7/2016, 9/24/2015, 12/8/2014, 2/10/2014, 2/16/2013    SERUM CREATININE 1/9/2018 1/9/2017, 12/11/2016, 12/10/2016, 12/9/2016, 12/8/2016, 12/7/2016, 12/5/2016, 11/19/2016, 11/15/2016, 10/7/2016, 5/24/2016, 12/17/2015, 9/24/2015, 4/29/2015, 12/8/2014, 2/10/2014 (Done), 2/10/2014, 3/14/2013, 2/16/2013    Override on 2/10/2014: Done    IMM DTaP/Tdap/Td Vaccine (2 - Td)  2/10/2024 2/10/2014    COLONOSCOPY 7/21/2024 7/21/2014            Current Immunizations     13-VALENT PCV PREVNAR 12/31/2014    Hepatitis B Vaccine Recombivax (Adol/Adult)  Incomplete, 1/12/2017  9:16 AM    Influenza Vaccine Quad Inj (Preserved) 10/11/2016 12:20 PM, 12/31/2014    Pneumococcal polysaccharide vaccine (PPSV-23) 11/19/2016  8:59 AM    Tdap Vaccine 2/10/2014 10:11 AM      Below and/or attached are the medications your provider expects you to take. Review all of your home medications and newly ordered medications with your provider and/or pharmacist. Follow medication instructions as directed by your provider and/or pharmacist. Please keep your medication list with you and share with your provider. Update the information when medications are discontinued, doses are changed, or new medications (including over-the-counter products) are added; and carry medication information at all times in the event of emergency situations     Allergies:  METOPROLOL - (reactions not documented)               Medications  Valid as of: April 13, 2017 -  9:01 AM    Generic Name Brand Name Tablet Size Instructions for use    ALPRAZolam (Tab) XANAX 0.25 MG Take 1 Tab by mouth at bedtime as needed for Anxiety.        Aspirin (Tablet Delayed Response) ECOTRIN 81 MG Take 81 mg by mouth every day.        Cholecalciferol (Tab) cholecalciferol 1000 UNIT Take 1,000 Units by mouth 2 Times a Day.        Cyclobenzaprine HCl (Tab) FLEXERIL 10 MG Take 10 mg by mouth 1 time daily as needed for Moderate Pain or Muscle Spasms. Pt is taking Roxicodone for post surgery pain and is not taking this yet since DC from CABG.  But he has it.        Diphenhydramine-APAP (sleep) (Tab) Diphenhydramine-APAP (sleep)  MG Take 2 Tabs by mouth 1 time daily as needed (sleep).        Dulaglutide (Solution Pen-injector) Dulaglutide 1.5 MG/0.5ML Inject 1 Each as instructed every 7 days. On Mon        MetFORMIN HCl (Tab) GLUCOPHAGE 1000 MG Take 1,000 mg by  mouth 2 times a day, with meals.        Rosuvastatin Calcium (Tab) CRESTOR 20 MG Take 1 Tab by mouth every bedtime.        .                 Medicines prescribed today were sent to:     [facility] [place], [place] - [address]    [address] [place] [address]    Phone: [phone] Fax: [phone]    Open 24 Hours?: No      Medication refill instructions:       If your prescription bottle indicates you have medication refills left, it is not necessary to call your provider’s office. Please contact your pharmacy and they will refill your medication.    If your prescription bottle indicates you do not have any refills left, you may request refills at any time through one of the following ways: The online [employer] system (except Urgent Care), by calling your provider’s office, or by asking your pharmacy to contact your provider’s office with a refill request. Medication refills are processed only during regular business hours and may not be available until the next business day. Your provider may request additional information or to have a follow-up visit with you prior to refilling your medication.   *Please Note: Medication refills are assigned a new Rx number when refilled electronically. Your pharmacy may indicate that no refills were authorized even though a new prescription for the same medication is available at the pharmacy. Please request the medicine by name with the pharmacy before contacting your provider for a refill.        Other Notes About Your Plan     Social History: Adopted           [employer] Access Code: Activation code not generated  Current [employer] Status: Active

## 2017-04-15 PROBLEM — R53.83 FATIGUE: Status: RESOLVED | Noted: 2017-02-06 | Resolved: 2017-04-15

## 2017-04-15 NOTE — PROGRESS NOTES
Subjective:     Chief Complaint   Patient presents with   • Diabetes Mellitus     Follow up on ENDO   • Heart Problem     Follow up on Cardio   • Anxiety     Refill on Xanax     Hu Santacruz is a 57 y.o. male here today for evaluation and management of:    Coronary artery disease involving native coronary artery of native heart without angina pectoris  Stable. Under care of cardiology.  Recently discontinued metoprolol due to side effects-- fatigue and mood effects.  Blood pressure under control. Continues to take Crestor as prescribed along with his 81 mg of ASA nightly.  Blood pressure under good control.   Denies pounding headache, visual changes, palpitations, flushed face.   Feels well since recent bypass. No longer going to cardiac rehab. Reports going to the gym regularly.   No longer on Plavix.     Dyslipidemia associated with type 2 diabetes mellitus (CMS-[facility])  Diabetes under good control. Under care of endo.  Endo OK with recent discontinuation of Glipizide-- he was having too many hypoglycemic events.  Reports now just on Trulicity and metformin. No side effects. Tolerating well.   Checking BS reguarly. Running around 170 fasting this morning.   Diet is improved. Going to gym regularly.   No recent eye exam--plans to schedule.  Ace inhibitor was discontinued by cardiology.   Denies polyuria, polydipsia, blurred vision, or neuropathies.    Situational anxiety  Reports doing a lot better. Still having days where he needs 1/2 Xanax but finds them very few and far between.  Requesting one last fill on Xanax.   Still coping with loss of his mother.   Denies depression, SI, or HI.    ROS   Denies any recent fevers or chills.   No nausea or vomiting. No diarrhea.   No chest pains or shortness of breath.   No lower extremity edema.    Allergies   Allergen Reactions   • Metoprolol        Current medicines (including changes today)  Current Outpatient Prescriptions   Medication Sig Dispense Refill   •  "alprazolam (XANAX) 0.25 MG Tab Take 1 Tab by mouth at bedtime as needed for Anxiety. 20 Tab 0   • rosuvastatin (CRESTOR) 20 MG Tab Take 1 Tab by mouth every bedtime. 90 Tab 3   • Dulaglutide (TRULICITY) 1.5 MG/0.5ML Solution Pen-injector Inject 1 Each as instructed every 7 days. On Mon 4 PEN 8   • aspirin EC (ECOTRIN) 81 MG Tablet Delayed Response Take 81 mg by mouth every day.     • metformin (GLUCOPHAGE) 1000 MG tablet Take 1,000 mg by mouth 2 times a day, with meals.     • vitamin D (CHOLECALCIFEROL) 1000 UNIT TABS Take 1,000 Units by mouth 2 Times a Day.     • Diphenhydramine-APAP, sleep, (TYLENOL PM EXTRA STRENGTH)  MG Tab Take 2 Tabs by mouth 1 time daily as needed (sleep).     • cyclobenzaprine (FLEXERIL) 10 MG Tab Take 10 mg by mouth 1 time daily as needed for Moderate Pain or Muscle Spasms. Pt is taking Roxicodone for post surgery pain and is not taking this yet since DC from CABG.  But he has it.       No current facility-administered medications for this visit.       Patient Active Problem List    Diagnosis Date Noted   • S/P CABG (coronary artery bypass graft) 12/20/2016     Priority: Medium   • CAD (coronary artery disease), native coronary artery 04/16/2012     Priority: Medium   • Dyslipidemia associated with type 2 diabetes mellitus (CMS-[facility]) 04/16/2012     Priority: Medium   • Situational anxiety 10/11/2016     Priority: Low   • Erectile dysfunction 02/24/2014     Priority: Low   • Calcifying tendinitis of shoulder 04/02/2013     Priority: Low   • Bicipital tenosynovitis 04/02/2013     Priority: Low       Family History   Problem Relation Age of Onset   • Adopted: Yes   • Heart Failure Neg Hx           Objective:     Blood pressure 110/70, pulse 90, temperature 36.4 °C (97.5 °F), height 1.816 m (5' 11.5\"), weight 89.54 kg (197 lb 6.4 oz), SpO2 98 %. Body mass index is 27.15 kg/(m^2).     Physical Exam:  Gen: Well developed, well nourished in no acute distress.   Skin: Pink, warm, and " dry  HEENT: conjunctiva non-injected, sclera non-icteric. EOMs intact.   Neck: Supple, trachea midline. No adenopathy or masses in the neck or supraclavicular regions.  Lungs: Effort is normal. Clear to auscultation bilaterally with good excursion.  CV: regular rate and rhythm.  Abdomen: soft, nontender, + BS.   Ext: no edema, color normal, vascularity normal, temperature normal  Alert and oriented Eye contact is good, speech goal directed, affect calm    Lab Results   Component Value Date/Time    GLYCOHEMOGLOBIN 5.7 02/06/2017 03:15 PM          Assessment and Plan:   The following treatment plan was discussed:     1. Coronary artery disease involving native coronary artery of native heart without angina pectoris -Stable. Continue current medicines. Monitor labs regularly. Follow-up with cardiology as directed.    2. Dyslipidemia associated with type 2 diabetes mellitus (CMS-HCC) -Stable. Continue current medicines. Monitor labs regularly. Follow-up with specialists (cardiology and endo) as directed.      3. Situational anxiety - stable and improved. Last fill of Xanax given today after review of [employer]. alprazolam (XANAX) 0.25 MG Tab   4. Need for vaccination -VIS given. Informed consent obtained. Vaccine administered in office.  RTC for MA visit in July for Hep B #3 Hepatitis B Vaccine Adult IM     - HM: Due for retinal eye exam. Encouraged follow-up eye care professional and explained importance of annual exam. Otherwise Up-to-date  -Any change or worsening of signs or symptoms, patient encouraged to follow-up or report to emergency room for further evaluation. Patient verbalizes understanding and agrees.    Followup: Return in about 3 months (around 7/13/2017) for MA visit for Hep B shot; 6 mos follow-up . sooner if needed. Follow-up with specialists as directed.

## 2017-04-17 NOTE — PROGRESS NOTES
Date: 4/17/2017    Discharge Individual Treatment Plan review for the [facility] Intensive Cardiac Rehabilitation (ICR) Program.    Hu Santacruz, 57 y.o. male, with qualifying diagnosis/diagnoses of S/P CABG.    Primary Care Provider: Jen Ortiz PA-C    Heart Specialist (s): Dr. CHARISSE Olmstead    Patient has successfully completed 7 Exercise Sessions, and an appropriate number of corresponding Education Sessions.  Patient is (continues to be) an excellent candidate for the [facility] Intensive Cardiac Rehabilitation (ICR) Program.    Patient will be discharged from Intensive Cardiac Rehab at this time at the request of the patient. Hu stated that since the death of his mother he has too many family obligations to continue to attend his appointments.     Galileo Castellon MD, [facility]  , [facility] Intensive Cardiac Rehabilitation (ICR) Program

## 2017-07-11 NOTE — TELEPHONE ENCOUNTER
We should get a supply of Hep B from a different  late this week. That is why patient has appt. For next week.

## 2017-07-19 ENCOUNTER — TELEPHONE (OUTPATIENT)
Dept: MEDICAL GROUP | Age: 57
End: 2017-07-19

## 2017-07-19 DIAGNOSIS — Z23 NEED FOR VACCINATION: ICD-10-CM

## 2017-07-20 ENCOUNTER — NON-PROVIDER VISIT (OUTPATIENT)
Dept: MEDICAL GROUP | Age: 57
End: 2017-07-20
Payer: COMMERCIAL

## 2017-07-20 DIAGNOSIS — Z23 NEED FOR VACCINATION: ICD-10-CM

## 2017-07-20 PROCEDURE — 90746 HEPB VACCINE 3 DOSE ADULT IM: CPT | Performed by: PHYSICIAN ASSISTANT

## 2017-07-20 PROCEDURE — 90471 IMMUNIZATION ADMIN: CPT | Performed by: PHYSICIAN ASSISTANT

## 2017-07-20 NOTE — MR AVS SNAPSHOT
Hu Coates Tanyareneedirk   2017 10:45 AM   Non-Provider Visit   MRN: [id number]    Department:  [address] [place] [address]   Dept Phone:  [phone]    Description:  Male : 1960   Provider:  TRINO RODRIGUEZ MA           Reason for Visit     Immunizations Final Hep B      Allergies as of 2017     Allergen Noted Reactions    Metoprolol 2017         You were diagnosed with     Need for vaccination   [318764]         Vital Signs     Smoking Status                   Never Smoker            Basic Information     Date Of Birth Sex Race Ethnicity Preferred Language    1960 Male White Non- English      Your appointments     Aug 08, 2017  8:10 AM   Established Patient with Kelvin Turcios PA-C   [facility]    [address]  [place] [address] [phone]           You will be receiving a confirmation call a few days before your appointment from our automated call confirmation system.            Sep 28, 2017  8:15 AM   FOLLOW UP with Sri Olmstead M.D.   [facility] (--)    [address]  [place] [id number] [phone]            Oct 17, 2017  8:10 AM   Established Patient with Jen Ortiz PA-C   [address]    [address] [facility] [address]  [place] [address] [phone]           You will be receiving a confirmation call a few days before your appointment from our automated call confirmation system.              Problem List              ICD-10-CM Priority Class Noted - Resolved    CAD (coronary artery disease), native coronary artery (Chronic) I25.10 Medium  2012 - Present    Dyslipidemia associated with type 2 diabetes mellitus (CMS-HCC) E11.69, E78.5 Medium  2012 - Present    Calcifying tendinitis of shoulder M75.30 Low  2013 - Present    Bicipital tenosynovitis M75.20 Low  2013 - Present    Erectile dysfunction N52.9 Low   2/24/2014 - Present    Situational anxiety F41.8 Low  10/11/2016 - Present    S/P CABG (coronary artery bypass graft) Z95.1 Medium  12/20/2016 - Present      Health Maintenance        Date Due Completion Dates    RETINAL SCREENING 1/26/2016 1/26/2015    IMM HEP B VACCINE (3 of 3 - Primary Series) 6/8/2017 4/13/2017, 1/12/2017    A1C SCREENING 8/6/2017 2/6/2017, 12/5/2016, 10/11/2016, 5/24/2016, 12/17/2015, 9/24/2015, 4/29/2015, 12/8/2014, 6/2/2014, 2/10/2014, 2/16/2013    IMM INFLUENZA (1) 9/1/2017 10/11/2016, 12/31/2014    URINE ACR / MICROALBUMIN 10/7/2017 10/7/2016, 9/24/2015, 2/10/2014, 2/16/2013    DIABETES MONOFILAMENT / LE EXAM 10/11/2017 10/11/2016, 5/6/2015 (Done), 2/10/2014 (Done)    Override on 5/6/2015: Done    Override on 2/10/2014: Done    FASTING LIPID PROFILE 1/9/2018 1/9/2017, 10/7/2016, 9/24/2015, 12/8/2014, 2/10/2014, 2/16/2013    SERUM CREATININE 1/9/2018 1/9/2017, 12/11/2016, 12/10/2016, 12/9/2016, 12/8/2016, 12/7/2016, 12/5/2016, 11/19/2016, 11/15/2016, 10/7/2016, 5/24/2016, 12/17/2015, 9/24/2015, 4/29/2015, 12/8/2014, 2/10/2014 (Done), 2/10/2014, 3/14/2013, 2/16/2013    Override on 2/10/2014: Done    IMM DTaP/Tdap/Td Vaccine (2 - Td) 2/10/2024 2/10/2014    COLONOSCOPY 7/21/2024 7/21/2014            Current Immunizations     13-VALENT PCV PREVNAR 12/31/2014    Hepatitis B Vaccine Recombivax (Adol/Adult) 7/20/2017, 4/13/2017  9:20 AM, 1/12/2017  9:16 AM    Influenza Vaccine Quad Inj (Preserved) 10/11/2016 12:20 PM, 12/31/2014    Pneumococcal polysaccharide vaccine (PPSV-23) 11/19/2016  8:59 AM    Tdap Vaccine 2/10/2014 10:11 AM      Below and/or attached are the medications your provider expects you to take. Review all of your home medications and newly ordered medications with your provider and/or pharmacist. Follow medication instructions as directed by your provider and/or pharmacist. Please keep your medication list with you and share with your provider. Update the information when  medications are discontinued, doses are changed, or new medications (including over-the-counter products) are added; and carry medication information at all times in the event of emergency situations     Allergies:  METOPROLOL - (reactions not documented)               Medications  Valid as of: July 20, 2017 - 11:07 AM    Generic Name Brand Name Tablet Size Instructions for use    ALPRAZolam (Tab) XANAX 0.25 MG Take 1 Tab by mouth at bedtime as needed for Anxiety.        Aspirin (Tablet Delayed Response) ECOTRIN 81 MG Take 81 mg by mouth every day.        Cholecalciferol (Tab) cholecalciferol 1000 UNIT Take 1,000 Units by mouth 2 Times a Day.        Dulaglutide (Solution Pen-injector) Dulaglutide 1.5 MG/0.5ML Inject 1 Each as instructed every 7 days. On Mon        MetFORMIN HCl (Tab) GLUCOPHAGE 1000 MG Take 1 Tab by mouth 2 times a day, with meals.        Rosuvastatin Calcium (Tab) CRESTOR 20 MG Take 1 Tab by mouth every bedtime.        .                 Medicines prescribed today were sent to:     [facility] [address] [place] [address], [place] [address]    [address] [facility] [address]    Phone: [phone] Fax: [phone]    Open 24 Hours?: No      Medication refill instructions:       If your prescription bottle indicates you have medication refills left, it is not necessary to call your provider’s office. Please contact your pharmacy and they will refill your medication.    If your prescription bottle indicates you do not have any refills left, you may request refills at any time through one of the following ways: The online [employer] system (except Urgent Care), by calling your provider’s office, or by asking your pharmacy to contact your provider’s office with a refill request. Medication refills are processed only during regular business hours and may not be available until the next business day. Your provider may request additional information or to have a follow-up visit with you prior to  refilling your medication.   *Please Note: Medication refills are assigned a new Rx number when refilled electronically. Your pharmacy may indicate that no refills were authorized even though a new prescription for the same medication is available at the pharmacy. Please request the medicine by name with the pharmacy before contacting your provider for a refill.        Other Notes About Your Plan     Social History: Adopted           [employer] Access Code: Activation code not generated  Current [employer] Status: Active

## 2017-07-20 NOTE — PROGRESS NOTES
"Hu Santacruz is a 57 y.o. male here for a non-provider visit for:   HEPATITIS B 3 of 3    Reason for immunization: continue or complete series started at the office  Immunization records indicate need for vaccine: Yes, confirmed with Epic  Minimum interval has been met for this vaccine:   ABN completed: Not Indicated    Order and dose verified by: TRE  VIS Dated  07/20/2016 was given to patient: Yes  All IAC Questionnaire questions were answered “No.\"    Patient tolerated injection and no adverse effects were observed or reported: Yes    Pt scheduled for next dose in series: No    "

## 2017-08-08 ENCOUNTER — OFFICE VISIT (OUTPATIENT)
Dept: ENDOCRINOLOGY | Facility: MEDICAL CENTER | Age: 57
End: 2017-08-08
Payer: COMMERCIAL

## 2017-08-08 VITALS
SYSTOLIC BLOOD PRESSURE: 122 MMHG | WEIGHT: 197 LBS | BODY MASS INDEX: 26.68 KG/M2 | DIASTOLIC BLOOD PRESSURE: 76 MMHG | OXYGEN SATURATION: 97 % | HEART RATE: 74 BPM | HEIGHT: 72 IN

## 2017-08-08 DIAGNOSIS — E11.69 DYSLIPIDEMIA ASSOCIATED WITH TYPE 2 DIABETES MELLITUS (HCC): ICD-10-CM

## 2017-08-08 DIAGNOSIS — E78.5 DYSLIPIDEMIA ASSOCIATED WITH TYPE 2 DIABETES MELLITUS (HCC): ICD-10-CM

## 2017-08-08 DIAGNOSIS — I25.10 CORONARY ARTERY DISEASE INVOLVING NATIVE CORONARY ARTERY OF NATIVE HEART WITHOUT ANGINA PECTORIS: Chronic | ICD-10-CM

## 2017-08-08 LAB
HBA1C MFR BLD: 7.1 % (ref ?–5.8)
INT CON NEG: NEGATIVE
INT CON POS: POSITIVE

## 2017-08-08 PROCEDURE — 99214 OFFICE O/P EST MOD 30 MIN: CPT | Performed by: PHYSICIAN ASSISTANT

## 2017-08-08 PROCEDURE — 83036 HEMOGLOBIN GLYCOSYLATED A1C: CPT | Performed by: PHYSICIAN ASSISTANT

## 2017-08-08 RX ORDER — EMPAGLIFLOZIN AND METFORMIN HYDROCHLORIDE 12.5; 1 MG/1; MG/1
1 TABLET ORAL 2 TIMES DAILY
Qty: 180 TAB | Refills: 3 | Status: SHIPPED | OUTPATIENT
Start: 2017-08-08 | End: 2017-12-11

## 2017-08-08 NOTE — PATIENT INSTRUCTIONS
He is currently taking  1.   Trulicity 1.5 qweekly  2.   Metformin 1000 BID    SToP Metformin    START   1.  Synjardy 12.5/1000 one in the AM one in the PM  2.   Trulicity 1.5 once a week

## 2017-08-08 NOTE — MR AVS SNAPSHOT
"        Hu Santacruz   2017 8:10 AM   Office Visit   MRN: [id number]    Department:  End[facility]inology [facility]   Dept Phone:  [phone]    Description:  Male : 1960   Provider:  Kelvin Turcios PA-C           Reason for Visit     Diabetes           Allergies as of 2017     Allergen Noted Reactions    Metoprolol 2017         You were diagnosed with     Dyslipidemia associated with type 2 diabetes mellitus (CMS-[facility])   [863569]       Coronary artery disease involving native coronary artery of native heart without angina pectoris   [0105807]         Vital Signs     Blood Pressure Pulse Height Weight Body Mass Index Oxygen Saturation    122/76 mmHg 74 1.816 m (5' 11.5\") 89.359 kg (197 lb) 27.10 kg/m2 97%    Smoking Status                   Never Smoker            Basic Information     Date Of Birth Sex Race Ethnicity Preferred Language    1960 Male White Non- English      Your appointments     Sep 28, 2017  8:15 AM   FOLLOW UP with Sri Olmstead M.D.   [facility] (--)    [address]  [facility] [address] [phone]            Oct 17, 2017  8:10 AM   Established Patient with Jen Ortiz PA-C   [facility] [address] [facility]    [address] [facility] [address]  [facility] [address] [phone]           You will be receiving a confirmation call a few days before your appointment from our automated call confirmation system.            2017  8:10 AM   Established Patient with Kelvin Turcios PA-C   [facility]    [address]  [facility] [address] [phone]           You will be receiving a confirmation call a few days before your appointment from our automated call confirmation system.              Problem List              ICD-10-CM Priority Class Noted - Resolved    CAD (coronary artery disease), native coronary artery (Chronic) I25.10 Medium  " Subjective:     Shani Love is a 64 y.o. female here today for follow up of Diabetes. She is here today with her daughter.     Diabetes type 2, uncontrolled    Last A1c: 8.6, this is an increase from 8.1   DM Medications: Lantus, 70 units daily Patient reports good medication compliance.   HTN: Blood pressure goal <140/<90   ACE: Lisinopril 5 mg   Hyperlipidemia: Cholesterol goal LDL <100, Last LDL 83. Currently Rx Statin: Simvastatin 20 mg   Last monofilament foot exam: 10/2016 Checks feet at home: yes, no sores currently   Last Eye exam: 2017   Kidney function: GFR 52, will continue to monitor   Microalbumin [id number]  Has patient received flu vaccine: declines  Has patient received Hep B series: today in clinic         Persistent atrial fibrillation (CMS-HCC)  Shani is followed by Cardiology. Dr. Good. Most recent INR completed here, between 2-3.     History of stroke  Patient followed by Neurology, Reina Segura PA-C. No new concerning symptoms.     Fecal occult blood test positive  Patient is currently scheduled with GI for colonoscopy.     Chronic kidney disease, stage III (moderate)  GFR 52. Will continue to monitor.       Current medicines (including changes today)  Current Outpatient Prescriptions   Medication Sig Dispense Refill   • insulin glargine (LANTUS) 100 UNIT/ML Solution Take 20 units in the morning and 46 units in the evening as directed for a total dose of 66 units daily. 1 Vial 6   • simvastatin (ZOCOR) 20 MG Tab Take 1 Tab by mouth every evening. 90 Tab 3   • warfarin (COUMADIN) 5 MG Tab Take one to 2  tablets daily as directed by coumadin clinic 180 Tab 1   • lisinopril (PRINIVIL) 5 MG Tab Take 1 Tab by mouth every day. 90 Tab 3   • metoprolol (LOPRESSOR) 50 MG Tab Take 1 Tab by mouth 2 times a day. 180 Tab 3   • diltiazem CD (CARDIZEM CD) 120 MG CAPSULE SR 24 HR Take 1 Cap by mouth 2 Times a Day. 180 Cap 3   • glimepiride (AMARYL) 2 MG Tab TAKE 2 TABLETS BY MOUTH EVERY MORNING 60  "Tab 2   • cholecalciferol (VITAMIN D3) 5000 UNIT Cap Take 1 Cap by mouth every day. 30 Cap 5   • INSULIN SYRINGE 1CC/29G 29G X 1/2\" 1 ML Misc 1 Each by Does not apply route every day. 100 Each 3   • glucose blood strip 1 Strip by Other route as needed (one touch ultra meter test bid dx- E11.65). 100 Strip 11   • warfarin (COUMADIN) 5 MG Tab Take one to one and one-half (1-1.5) tablets daily as directed by coumadin clinic 180 Tab 3   • furosemide (LASIX) 40 MG Tab Take 1 Tab by mouth every day. 90 Tab 3   • magnesium oxide (MAG-OX) 400 (241.3 MG) MG Tab tablet Take 1 Tab by mouth every day. 60 Tab 2     No current facility-administered medications for this visit.       She  has a past medical history of Diabetes; Hypertension; Heart murmur; Morbid obesity (CMS-HCC); Stroke (CMS-HCC) (11/18/2015); Atrial flutter (CMS-HCC) (11/18/2015); Atrial fibrillation (CMS-HCC); Hyperlipidemia; Pneumonia (4/6/2016); Osteoarthritis; and Valvular heart disease.    She  has past surgical history that includes tonsillectomy; pilonidal cyst excision; and recovery (5/9/2016).    Social History     Social History   • Marital Status:      Spouse Name: N/A   • Number of Children: N/A   • Years of Education: N/A     Occupational History   • Not on file.     Social History Main Topics   • Smoking status: Passive Smoke Exposure - Never Smoker   • Smokeless tobacco: Never Used   • Alcohol Use: No   • Drug Use: No   • Sexual Activity: Not on file     Other Topics Concern   • Not on file     Social History Narrative       Review of patient's allergies indicates no known allergies.      ROS  No fever, no vision changes, no headache, no chest pain, no edema, no shortness of breath, no abdominal pain, no rashes, no sores     All other systems reviewed and are negative.        Objective:     Blood pressure 130/78, pulse 70, temperature 36.7 °C (98.1 °F), resp. rate 18, height 1.626 m (5' 4.02\"), weight 142.883 kg (315 lb), SpO2 92 %. Body " 4/16/2012 - Present    Dyslipidemia associated with type 2 diabetes mellitus (CMS-HCC) E11.69, E78.5 Medium  4/16/2012 - Present    Calcifying tendinitis of shoulder M75.30 Low  4/2/2013 - Present    Bicipital tenosynovitis M75.20 Low  4/2/2013 - Present    Erectile dysfunction N52.9 Low  2/24/2014 - Present    Situational anxiety F41.8 Low  10/11/2016 - Present    S/P CABG (coronary artery bypass graft) Z95.1 Medium  12/20/2016 - Present      Health Maintenance        Date Due Completion Dates    RETINAL SCREENING 1/26/2016 1/26/2015    A1C SCREENING 8/6/2017 2/6/2017, 12/5/2016, 10/11/2016, 5/24/2016, 12/17/2015, 9/24/2015, 4/29/2015, 12/8/2014, 6/2/2014, 2/10/2014, 2/16/2013    IMM INFLUENZA (1) 9/1/2017 10/11/2016, 12/31/2014    URINE ACR / MICROALBUMIN 10/7/2017 10/7/2016, 9/24/2015, 2/10/2014, 2/16/2013    DIABETES MONOFILAMENT / LE EXAM 10/11/2017 10/11/2016, 5/6/2015 (Done), 2/10/2014 (Done)    Override on 5/6/2015: Done    Override on 2/10/2014: Done    FASTING LIPID PROFILE 1/9/2018 1/9/2017, 10/7/2016, 9/24/2015, 12/8/2014, 2/10/2014, 2/16/2013    SERUM CREATININE 1/9/2018 1/9/2017, 12/11/2016, 12/10/2016, 12/9/2016, 12/8/2016, 12/7/2016, 12/5/2016, 11/19/2016, 11/15/2016, 10/7/2016, 5/24/2016, 12/17/2015, 9/24/2015, 4/29/2015, 12/8/2014, 2/10/2014 (Done), 2/10/2014, 3/14/2013, 2/16/2013    Override on 2/10/2014: Done    IMM DTaP/Tdap/Td Vaccine (2 - Td) 2/10/2024 2/10/2014    COLONOSCOPY 7/21/2024 7/21/2014            Current Immunizations     13-VALENT PCV PREVNAR 12/31/2014    Hepatitis B Vaccine Recombivax (Adol/Adult) 7/20/2017, 4/13/2017  9:20 AM, 1/12/2017  9:16 AM    Influenza Vaccine Quad Inj (Preserved) 10/11/2016 12:20 PM, 12/31/2014    Pneumococcal polysaccharide vaccine (PPSV-23) 11/19/2016  8:59 AM    Tdap Vaccine 2/10/2014 10:11 AM      Below and/or attached are the medications your provider expects you to take. Review all of your home medications and newly ordered medications with your  mass index is 54.04 kg/(m^2).    Physical Exam:   Constitutional: Alert, no distress.  Eye: Equal, round and reactive, conjunctiva clear, lids normal.  ENMT: Lips without lesions, good dentition, oropharynx clear.  Neck: Trachea midline, no masses, no thyromegaly.  Respiratory: Unlabored respiratory effort, lungs clear to auscultation, no wheezes, no ronchi.  Cardiovascular: Normal S1, S2, no murmur, no edema. Capillary refill < 2 seconds in UE bilaterally.   Abdomen: Soft, non-tender, no masses, no hepatosplenomegaly.  Skin: Warm, dry, good turgor, no rashes in visible areas. There is some dryness on right LE, treated with cream today. No redness/signs of infection but advise to keep a close eye.   Neuro: normal sensation in extremities.   Psych: Alert and oriented x3, normal affect and mood.      Assessment and Plan:   The following treatment plan was discussed    1. Establishing care with new doctor, encounter for    2. Screening for breast cancer  - [id number]; Future    3. Need for vaccination  - Tdap =>8yo IM  - PNEUMOCOCCAL POLYSACCHARIDE VACCINE 23-VALENT =>1YO SQ/IM    4. Uncontrolled type 2 diabetes mellitus with complication, with long-term current use of insulin (CMS-HCC)  Patient had questions regarding healthy fats and diabetic diet. Referred to dietitian for consult. Discussed with patient to avoid. Based on increase in A1c, will increase Lantus to 26 units in the morning, 50 units in the evening. Advised patient to increase Lantus in the morning by 2 units weekly for the next 3 weeks. Further findings and symptoms of hypoglycemia, to seek emergent care. Return to clinic in 3 months.  - REFERRAL TO [facility] [employer] IMPROVEMENT PROGRAMS (HIP) Services Requested:: Registered Dietitian for Medical Nutrition Therapy; Reason for Visit:: Medical Condition Requiring Nutrition Counseling    5. Persistent atrial fibrillation (CMS-[facility])  Patient stable, continue to be followed by cardiologist.    6.  provider and/or pharmacist. Follow medication instructions as directed by your provider and/or pharmacist. Please keep your medication list with you and share with your provider. Update the information when medications are discontinued, doses are changed, or new medications (including over-the-counter products) are added; and carry medication information at all times in the event of emergency situations     Allergies:  METOPROLOL - (reactions not documented)               Medications  Valid as of: August 08, 2017 -  8:31 AM    Generic Name Brand Name Tablet Size Instructions for use    ALPRAZolam (Tab) XANAX 0.25 MG Take 1 Tab by mouth at bedtime as needed for Anxiety.        Aspirin (Tablet Delayed Response) ECOTRIN 81 MG Take 81 mg by mouth every day.        Cholecalciferol (Tab) cholecalciferol 1000 UNIT Take 1,000 Units by mouth 2 Times a Day.        Dulaglutide (Solution Pen-injector) Dulaglutide 1.5 MG/0.5ML Inject 1 Each as instructed every 7 days. On Mon        Empagliflozin-Metformin HCl (Tab) SYNJARDY 12.5-1000 MG Take 1 tablet by mouth 2 Times a Day.        Rosuvastatin Calcium (Tab) CRESTOR 20 MG Take 1 Tab by mouth every bedtime.        .                 Medicines prescribed today were sent to:     [facility] [address] [place] [address], [place] [address] [facility] [address]    [address] [facility] [place] [address]    Phone: [phone] Fax: [phone]    Open 24 Hours?: No      Medication refill instructions:       If your prescription bottle indicates you have medication refills left, it is not necessary to call your provider’s office. Please contact your pharmacy and they will refill your medication.    If your prescription bottle indicates you do not have any refills left, you may request refills at any time through one of the following ways: The online [employer] system (except Urgent Care), by calling your provider’s office, or by asking your pharmacy to contact your provider’s office with a refill request.  Chronic kidney disease, stage III (moderate)  We'll continue to monitor, recheck labs in 3-6 months.    7. Fecal occult blood test positive  Continue to follow up with GI. Scheduled for colonoscopy.    8. History of stroke  Followed closely by neurology. Patient appears stable.      Discussed diet, exercise, disease management and weight loss goals    Advised of s/s of hyper/hypoglycemia. Discussed s/s to seek emergent care.     Reviewed indication, dosage, usage and potential adverse effects of prescribed medications. Patient appears to understand, verbalizes understanding and is willing to try medications as prescribed.      Reviewed risks and benefits of treatment plan. Patient verbally agrees to plan of care.       Followup: Return in about 3 months (around 6/15/2017) for Diabetic Visit, Short.    Kane Campos, A.P.R.N.     PLEASE NOTE: This dictation was created using voice recognition software. I have made every reasonable attempt to correct obvious errors, but I expect that there may be errors of grammar and possibly content that I did not discover prior finalizing this note.               Medication refills are processed only during regular business hours and may not be available until the next business day. Your provider may request additional information or to have a follow-up visit with you prior to refilling your medication.   *Please Note: Medication refills are assigned a new Rx number when refilled electronically. Your pharmacy may indicate that no refills were authorized even though a new prescription for the same medication is available at the pharmacy. Please request the medicine by name with the pharmacy before contacting your provider for a refill.        Your To Do List     Future Labs/Procedures Complete By Expires    CBC WITH DIFFERENTIAL  As directed 8/8/2018    COMP METABOLIC PANEL  As directed 8/8/2018    LIPID PROFILE  As directed 8/8/2018    MICROALBUMIN CREAT RATIO URINE  As directed 8/8/2018    TSH  As directed 8/8/2018      Instructions    He is currently taking  1.   Trulicity 1.5 qweekly  2.   Metformin 1000 BID    SToP Metformin    START   1.  Synjardy 12.5/1000 one in the AM one in the PM  2.   Trulicity 1.5 once a week             Other Notes About Your Plan     Social History: Adopted           [employer] Access Code: Activation code not generated  Current [employer] Status: Active

## 2017-08-08 NOTE — PROGRESS NOTES
Return to office Patient Consult Note  Referred by: Jen Ortiz PA-C    Reason for consult: Diabetes Management Type 2    HPI:  Hu Santacruz is a 57 y.o. old patient who is seeing us today for diabetes care.  This is a pleasant patient with diabetes and I appreciate the opportunity to participate in the care of this patient.    8/8/17 HbA1c is 7.1 in the office  2/2017 HbA1c was 5.7  12/2015 HbA1c was 6.8    BG Diary:8/8/2017  In the AM: 189, 219, 175, 225, 183, 218, 218, 212  Before meal:  Before Bed: 136, 165, 126, 160     Weight: 197 today in the office    1. Dyslipidemia associated with type 2 diabetes mellitus (CMS-[facility])  He is currently taking  1.   Trulicity 1.5 qweekly  2.   Metformin 1000 BID    Was having lows with Amaryl    2. Coronary artery disease involving native coronary artery of native heart without angina pectoris  On Crestor 20mg qday  open heart surgery 3 vessel.  No transfusion in 2/2017      ROS:   Constitutional: No night sweats.  Eyes:  No visual changes.  Cardiac: No chest pain, No palpitations or racing heart rate.  Resp: No shortness of breath, No cough,   Gi: No Diarrhea    All other systems were reviewed and were/are negative.  The ROS was revised/revisited during this office visit from the patients first office visit with me on 2/6/17 Please review the full ROS during the first office visit.    Past Medical History:  Patient Active Problem List    Diagnosis Date Noted   • S/P CABG (coronary artery bypass graft) 12/20/2016     Priority: Medium   • CAD (coronary artery disease), native coronary artery 04/16/2012     Priority: Medium   • Dyslipidemia associated with type 2 diabetes mellitus (CMS-HCC) 04/16/2012     Priority: Medium   • Situational anxiety 10/11/2016     Priority: Low   • Erectile dysfunction 02/24/2014     Priority: Low   • Calcifying tendinitis of shoulder 04/02/2013     Priority: Low   • Bicipital tenosynovitis 04/02/2013     Priority: Low       Past Surgical  History:  Past Surgical History   Procedure Laterality Date   • Patella orif  1980     bilateral   • Knee arthroscopy  1983     left   • Dental extraction(s)  1997     wisdom teeth   • Shoulder arthroscopy w/ rotator cuff repair  4/2/2013     Performed by Angelique Romero M.D. at [facility]   • Shoulder decompression arthroscopic  4/2/2013     Performed by Angelique Romero M.D. at [facility]   • Joint injection diagnostic  4/2/2013     Performed by Angelique Romero M.D. at [facility]   • Clavicle distal excision  4/2/2013     Performed by Angelique Romero M.D. at [facility]   • Shoulder arthroscopy w/ bicipital tenodesis repair  4/2/2013     Performed by Angelique Romero M.D. at [facility]   • Stent placement  2008     x3   • Multiple coronary artery bypass endo vein harvest  12/6/2016     Procedure: MULTIPLE CORONARY ARTERY BYPASS ENDO VEIN HARVEST X3 WITH LAITH;  Surgeon: Juan Pablo Schmitz M.D.;  Location: [facility];  Service:    • Cardiac cath         Allergies:  Metoprolol    Social History:  Social History     Social History   • Marital Status:      Spouse Name: N/A   • Number of Children: 1   • Years of Education: N/A     Occupational History   • Contractor Other     Social History Main Topics   • Smoking status: Never Smoker    • Smokeless tobacco: Never Used   • Alcohol Use: 3.0 oz/week     6 Cans of beer per week      Comment: 2 per week   • Drug Use: No      Comment: Former Marijuana usage   • Sexual Activity:     Partners: Female     Other Topics Concern   • Not on file     Social History Narrative       Family History:  Family History   Problem Relation Age of Onset   • Adopted: Yes   • Family history unknown: Yes       Medications:    Current outpatient prescriptions:   •  metformin (GLUCOPHAGE) 1000 MG tablet, Take 1 Tab by mouth 2 times a day, with meals., Disp: 60 Tab, Rfl: 0  •  alprazolam (XANAX) 0.25  "MG Tab, Take 1 Tab by mouth at bedtime as needed for Anxiety., Disp: 20 Tab, Rfl: 0  •  rosuvastatin (CRESTOR) 20 MG Tab, Take 1 Tab by mouth every bedtime., Disp: 90 Tab, Rfl: 3  •  Dulaglutide (TRULICITY) 1.5 MG/0.5ML Solution Pen-injector, Inject 1 Each as instructed every 7 days. On Mon, Disp: 4 PEN, Rfl: 8  •  aspirin EC (ECOTRIN) 81 MG Tablet Delayed Response, Take 81 mg by mouth every day., Disp: , Rfl:   •  vitamin D (CHOLECALCIFEROL) 1000 UNIT TABS, Take 1,000 Units by mouth 2 Times a Day., Disp: , Rfl:         Physical Examination:   Vital signs: /76 mmHg  Pulse 74  Ht 1.816 m (5' 11.5\")  Wt 89.359 kg (197 lb)  BMI 27.10 kg/m2  SpO2 97%  General: No distress, cooperative, well dressed and well nourished.   Eyes: No scleral icterus or discharge, No hyposphagma  ENMT: Normal on external inspection of nose, lips, No nasal drainage   Neck: No abnormal masses on inspection  Resp: Normal effort, Bilateral clear to auscultation, No wheezing, No rales  CVS: Regular rate and rhythm, S1 S2 normal, No murmur. No gallop  Extremities: No edema bilateral extremities  Neuro: Alert and oriented  Skin: No rash, No Ulcers  Psych: Normal mood and affect      Assessment and Plan:    1. Dyslipidemia associated with type 2 diabetes mellitus (CMS-HCC)  He is currently taking  1.   Trulicity 1.5 qweekly  2.   Metformin 1000 BID    SToP Metformin    START   1.  Synjardy 12.5/1000 one in the AM one in the PM  2.  Trulicity 1.5 once a week      2. Coronary artery disease involving native coronary artery of native heart without angina pectoris  Needs to get back in with Cards for a check-up      Return in about 3 months (around 11/8/2017).    Blood glucose log: Check BG in the morning when wake up, before lunch or dinner and before bed.  So three times a day.  Always bring BG diary to the next office visit.     This patient during there office visit today was started on a new medication Synjardy.  Side effects of the new " medication were discussed with the patient today in the office.       Thank you kindly for allowing me to participate in the diabetes care plan for this patient.    Aly Turcios PA-C, BC-ADM  Board Certified - Advanced Diabetes Management  08/08/2017    CC:   Jen Ortiz PA-C

## 2017-08-11 ENCOUNTER — TELEPHONE (OUTPATIENT)
Dept: ENDOCRINOLOGY | Facility: MEDICAL CENTER | Age: 57
End: 2017-08-11

## 2017-08-11 NOTE — TELEPHONE ENCOUNTER
Pt called states he is having some reaction to the synjardy. He states that by his sternum he is having butterfly feelings with little anxiety. Please advise. He states he hasd a triple bypass surgery back in December.

## 2017-08-14 DIAGNOSIS — R00.2 PALPITATIONS: ICD-10-CM

## 2017-09-15 ENCOUNTER — HOSPITAL ENCOUNTER (OUTPATIENT)
Dept: LAB | Facility: MEDICAL CENTER | Age: 57
End: 2017-09-15
Attending: PHYSICIAN ASSISTANT
Payer: COMMERCIAL

## 2017-09-15 DIAGNOSIS — E11.69 DYSLIPIDEMIA ASSOCIATED WITH TYPE 2 DIABETES MELLITUS (HCC): ICD-10-CM

## 2017-09-15 DIAGNOSIS — E78.5 DYSLIPIDEMIA ASSOCIATED WITH TYPE 2 DIABETES MELLITUS (HCC): ICD-10-CM

## 2017-09-15 DIAGNOSIS — I25.10 CORONARY ARTERY DISEASE INVOLVING NATIVE CORONARY ARTERY OF NATIVE HEART WITHOUT ANGINA PECTORIS: Chronic | ICD-10-CM

## 2017-09-15 LAB
ALBUMIN SERPL BCP-MCNC: 3.8 G/DL (ref 3.2–4.9)
ALBUMIN/GLOB SERPL: 1.3 G/DL
ALP SERPL-CCNC: 56 U/L (ref 30–99)
ALT SERPL-CCNC: 34 U/L (ref 2–50)
ANION GAP SERPL CALC-SCNC: 10 MMOL/L (ref 0–11.9)
AST SERPL-CCNC: 22 U/L (ref 12–45)
BASOPHILS # BLD AUTO: 0.8 % (ref 0–1.8)
BASOPHILS # BLD: 0.05 K/UL (ref 0–0.12)
BILIRUB SERPL-MCNC: 0.8 MG/DL (ref 0.1–1.5)
BUN SERPL-MCNC: 13 MG/DL (ref 8–22)
CALCIUM SERPL-MCNC: 9.5 MG/DL (ref 8.5–10.5)
CHLORIDE SERPL-SCNC: 104 MMOL/L (ref 96–112)
CHOLEST SERPL-MCNC: 115 MG/DL (ref 100–199)
CO2 SERPL-SCNC: 26 MMOL/L (ref 20–33)
CREAT SERPL-MCNC: 0.82 MG/DL (ref 0.5–1.4)
CREAT UR-MCNC: 85.4 MG/DL
EOSINOPHIL # BLD AUTO: 0.14 K/UL (ref 0–0.51)
EOSINOPHIL NFR BLD: 2.2 % (ref 0–6.9)
ERYTHROCYTE [DISTWIDTH] IN BLOOD BY AUTOMATED COUNT: 41.3 FL (ref 35.9–50)
GFR SERPL CREATININE-BSD FRML MDRD: >60 ML/MIN/1.73 M 2
GLOBULIN SER CALC-MCNC: 3 G/DL (ref 1.9–3.5)
GLUCOSE SERPL-MCNC: 111 MG/DL (ref 65–99)
HCT VFR BLD AUTO: 48.4 % (ref 42–52)
HDLC SERPL-MCNC: 38 MG/DL
HGB BLD-MCNC: 16.5 G/DL (ref 14–18)
IMM GRANULOCYTES # BLD AUTO: 0.01 K/UL (ref 0–0.11)
IMM GRANULOCYTES NFR BLD AUTO: 0.2 % (ref 0–0.9)
LDLC SERPL CALC-MCNC: 63 MG/DL
LYMPHOCYTES # BLD AUTO: 1.75 K/UL (ref 1–4.8)
LYMPHOCYTES NFR BLD: 27.5 % (ref 22–41)
MCH RBC QN AUTO: 31.2 PG (ref 27–33)
MCHC RBC AUTO-ENTMCNC: 34.1 G/DL (ref 33.7–35.3)
MCV RBC AUTO: 91.5 FL (ref 81.4–97.8)
MICROALBUMIN UR-MCNC: <0.7 MG/DL
MICROALBUMIN/CREAT UR: NORMAL MG/G (ref 0–30)
MONOCYTES # BLD AUTO: 0.56 K/UL (ref 0–0.85)
MONOCYTES NFR BLD AUTO: 8.8 % (ref 0–13.4)
NEUTROPHILS # BLD AUTO: 3.85 K/UL (ref 1.82–7.42)
NEUTROPHILS NFR BLD: 60.5 % (ref 44–72)
NRBC # BLD AUTO: 0 K/UL
NRBC BLD AUTO-RTO: 0 /100 WBC
PLATELET # BLD AUTO: 256 K/UL (ref 164–446)
PMV BLD AUTO: 9.6 FL (ref 9–12.9)
POTASSIUM SERPL-SCNC: 4.2 MMOL/L (ref 3.6–5.5)
PROT SERPL-MCNC: 6.8 G/DL (ref 6–8.2)
RBC # BLD AUTO: 5.29 M/UL (ref 4.7–6.1)
SODIUM SERPL-SCNC: 140 MMOL/L (ref 135–145)
TRIGL SERPL-MCNC: 71 MG/DL (ref 0–149)
TSH SERPL DL<=0.005 MIU/L-ACNC: 1.08 UIU/ML (ref 0.3–3.7)
WBC # BLD AUTO: 6.4 K/UL (ref 4.8–10.8)

## 2017-09-15 PROCEDURE — 82043 UR ALBUMIN QUANTITATIVE: CPT

## 2017-09-15 PROCEDURE — 82570 ASSAY OF URINE CREATININE: CPT

## 2017-09-15 PROCEDURE — 85025 COMPLETE CBC W/AUTO DIFF WBC: CPT

## 2017-09-15 PROCEDURE — 80053 COMPREHEN METABOLIC PANEL: CPT

## 2017-09-15 PROCEDURE — 80061 LIPID PANEL: CPT

## 2017-09-15 PROCEDURE — 36415 COLL VENOUS BLD VENIPUNCTURE: CPT

## 2017-09-15 PROCEDURE — 84443 ASSAY THYROID STIM HORMONE: CPT

## 2017-09-28 ENCOUNTER — OFFICE VISIT (OUTPATIENT)
Dept: CARDIOLOGY | Facility: MEDICAL CENTER | Age: 57
End: 2017-09-28
Payer: COMMERCIAL

## 2017-09-28 VITALS
BODY MASS INDEX: 26.28 KG/M2 | HEIGHT: 72 IN | HEART RATE: 85 BPM | WEIGHT: 194 LBS | SYSTOLIC BLOOD PRESSURE: 122 MMHG | DIASTOLIC BLOOD PRESSURE: 78 MMHG | OXYGEN SATURATION: 98 %

## 2017-09-28 DIAGNOSIS — E11.69 DYSLIPIDEMIA ASSOCIATED WITH TYPE 2 DIABETES MELLITUS (HCC): ICD-10-CM

## 2017-09-28 DIAGNOSIS — Z95.1 S/P CABG (CORONARY ARTERY BYPASS GRAFT): ICD-10-CM

## 2017-09-28 DIAGNOSIS — I25.10 CORONARY ARTERY DISEASE INVOLVING NATIVE CORONARY ARTERY OF NATIVE HEART WITHOUT ANGINA PECTORIS: Chronic | ICD-10-CM

## 2017-09-28 DIAGNOSIS — E78.5 DYSLIPIDEMIA ASSOCIATED WITH TYPE 2 DIABETES MELLITUS (HCC): ICD-10-CM

## 2017-09-28 PROCEDURE — 99214 OFFICE O/P EST MOD 30 MIN: CPT | Performed by: INTERNAL MEDICINE

## 2017-09-28 ASSESSMENT — ENCOUNTER SYMPTOMS
BRUISES/BLEEDS EASILY: 0
WEIGHT LOSS: 0
EYES NEGATIVE: 1
DIZZINESS: 0
COUGH: 0
NAUSEA: 0
MUSCULOSKELETAL NEGATIVE: 1
INSOMNIA: 0
HEARTBURN: 0
SHORTNESS OF BREATH: 0
LOSS OF CONSCIOUSNESS: 0
NERVOUS/ANXIOUS: 0
PALPITATIONS: 0

## 2017-09-28 NOTE — PROGRESS NOTES
Subjective:   Hu Santacruz is a 57 y.o. male who presents today in follow-up in regards to his coronary disease and Coronary artery bypass surgery 2016 at [facility] time       No problems with work full time  Enjoys the gym but couldn't find time and money for cardiac rehab  All meds as directed    Past Medical History:   Diagnosis Date   • Situational anxiety 10/11/2016   • Cold 2/21/13   • Adjustment disorder 4/16/2012   • Hyperlipidemia 4/16/2012   • CAD (coronary artery disease), native coronary artery 4/16/2012    CABG X3 (LIMA-LAD, SVG-2nd diag and R-PDA)-12/2016   • DM (diabetes mellitus) (CMS-[facility]) 2008    oral agent   • Old myocardial infarction 2008    cardiologist; Dr. Olmstead   • Back pain    • Muscle disorder    • Pain     right shoulder   • Snoring      Past Surgical History:   Procedure Laterality Date   • MULTIPLE CORONARY ARTERY BYPASS ENDO VEIN HARVEST  12/6/2016    Procedure: MULTIPLE CORONARY ARTERY BYPASS ENDO VEIN HARVEST X3 WITH LAITH;  Surgeon: Juan Pablo Schmitz M.D.;  Location: [facility];  Service:    • SHOULDER ARTHROSCOPY W/ ROTATOR CUFF REPAIR  4/2/2013    Performed by Angelique Romero M.D. at [facility]   • SHOULDER DECOMPRESSION ARTHROSCOPIC  4/2/2013    Performed by Angelique Romero M.D. at [facility]   • JOINT INJECTION DIAGNOSTIC  4/2/2013    Performed by Angelique Romero M.D. at [facility]   • CLAVICLE DISTAL EXCISION  4/2/2013    Performed by Angelique Romero M.D. at [facility]   • SHOULDER ARTHROSCOPY W/ BICIPITAL TENODESIS REPAIR  4/2/2013    Performed by Angelique Romero M.D. at [facility]   • STENT PLACEMENT  2008    x3   • DENTAL EXTRACTION(S)  1997    wisdom teeth   • KNEE ARTHROSCOPY  1983    left   • PATELLA ORIF  1980    bilateral   • CARDIAC CATH       Family History   Problem Relation Age of Onset   • Adopted: Yes   • Heart Attack Neg Hx    • Heart Disease Neg Hx      History  "  Smoking Status   • Never Smoker   Smokeless Tobacco   • Never Used     Allergies   Allergen Reactions   • Metoprolol      Outpatient Encounter Prescriptions as of 9/28/2017   Medication Sig Dispense Refill   • Dulaglutide (TRULICITY) 1.5 MG/0.5ML Solution Pen-injector Inject 1 Each as instructed every 7 days. On Mon 12 PEN 3   • SYNJARDY 12.5-1000 MG Tab Take 1 tablet by mouth 2 Times a Day. 180 Tab 3   • alprazolam (XANAX) 0.25 MG Tab Take 1 Tab by mouth at bedtime as needed for Anxiety. 20 Tab 0   • rosuvastatin (CRESTOR) 20 MG Tab Take 1 Tab by mouth every bedtime. 90 Tab 3   • aspirin EC (ECOTRIN) 81 MG Tablet Delayed Response Take 81 mg by mouth every day.     • vitamin D (CHOLECALCIFEROL) 1000 UNIT TABS Take 1,000 Units by mouth 2 Times a Day.     • Canagliflozin-Metformin HCl -1000 MG TABLET SR 24 HR Take 1 tablet by mouth 2 Times a Day. 60 Tab 11     No facility-administered encounter medications on file as of 9/28/2017.      Review of Systems   Constitutional: Negative for malaise/fatigue and weight loss.   Eyes: Negative.    Respiratory: Negative for cough and shortness of breath.    Cardiovascular: Negative for chest pain, palpitations and leg swelling.   Gastrointestinal: Negative for heartburn and nausea.   Musculoskeletal: Negative.    Neurological: Negative for dizziness and loss of consciousness.   Endo/Heme/Allergies: Does not bruise/bleed easily.   Psychiatric/Behavioral: The patient is not nervous/anxious and does not have insomnia.    All other systems reviewed and are negative.       Objective:   /78   Pulse 85   Ht 1.816 m (5' 11.5\")   Wt 88 kg (194 lb)   SpO2 98%   BMI 26.68 kg/m²     Physical Exam   Constitutional: He is oriented to person, place, and time. He appears well-developed and well-nourished.   HENT:   Head: Normocephalic and atraumatic.   Mouth/Throat: Oropharynx is clear and moist.   Eyes: EOM are normal. Pupils are equal, round, and reactive to light. No " scleral icterus.   Neck: Neck supple. No JVD present. No thyroid mass and no thyromegaly present.   Cardiovascular: Normal rate, regular rhythm, S1 normal, S2 normal and normal pulses.  PMI is not displaced.    No murmur heard.  Pulses:       Carotid pulses are 2+ on the right side, and 2+ on the left side.       Radial pulses are 2+ on the right side, and 2+ on the left side.   No peripheral edema. Nontender sternum   Pulmonary/Chest: Effort normal and breath sounds normal. He has no wheezes. He has no rales.   Abdominal: Normal appearance and bowel sounds are normal. He exhibits no abdominal bruit. There is no hepatosplenomegaly.   Musculoskeletal: Normal range of motion. He exhibits no edema.        Thoracic back: He exhibits no tenderness and no spasm.   Neurological: He is alert and oriented to person, place, and time. He exhibits normal muscle tone.   Skin: Skin is warm and dry. No rash noted. No cyanosis or erythema. Nails show no clubbing.   Psychiatric: He has a normal mood and affect. His behavior is normal.       Assessment:     1. Coronary artery disease involving native coronary artery of native heart without angina pectoris     2. Dyslipidemia associated with type 2 diabetes mellitus (CMS-HCC)     3. S/P CABG (coronary artery bypass graft)         Medical Decision Making:  Today's Assessment / Status / Plan:     Coronary disease post CABG.   Aspirin and statin   Intolerant of beta blockers   Echo normal at time of surgery December 2016, repeat next year   Labs reviewed and glucoses focused upon     Carotid disease, mild on duplex last year   Statin and aspirin as above     Diet and weight loss discussed   RTC 6 months

## 2017-11-14 ENCOUNTER — APPOINTMENT (OUTPATIENT)
Dept: ENDOCRINOLOGY | Facility: MEDICAL CENTER | Age: 57
End: 2017-11-14
Payer: COMMERCIAL

## 2017-12-11 ENCOUNTER — OFFICE VISIT (OUTPATIENT)
Dept: ENDOCRINOLOGY | Facility: MEDICAL CENTER | Age: 57
End: 2017-12-11
Payer: COMMERCIAL

## 2017-12-11 VITALS
HEART RATE: 80 BPM | SYSTOLIC BLOOD PRESSURE: 132 MMHG | WEIGHT: 193 LBS | OXYGEN SATURATION: 97 % | DIASTOLIC BLOOD PRESSURE: 78 MMHG | HEIGHT: 72 IN | BODY MASS INDEX: 26.14 KG/M2

## 2017-12-11 DIAGNOSIS — E78.5 DYSLIPIDEMIA ASSOCIATED WITH TYPE 2 DIABETES MELLITUS (HCC): ICD-10-CM

## 2017-12-11 DIAGNOSIS — E11.69 DYSLIPIDEMIA ASSOCIATED WITH TYPE 2 DIABETES MELLITUS (HCC): ICD-10-CM

## 2017-12-11 PROCEDURE — 99213 OFFICE O/P EST LOW 20 MIN: CPT | Performed by: PHYSICIAN ASSISTANT

## 2017-12-11 NOTE — PROGRESS NOTES
Return to office Patient Consult Note  Referred by: Jen Ortiz P.A.-C.    Reason for consult: Diabetes Management Type 2    HPI:  Hu Santacruz is a 57 y.o. old patient who is seeing us today for diabetes care.  This is a pleasant patient with diabetes and I appreciate the opportunity to participate in the care of this patient.    BG Diary:12/11/2017  In the AM: 145, 117, 153, 137  Before Bed: 113, 168, 107, 110, 156    8/8/17 HbA1c is 7.1 in the office  2/2017 HbA1c was 5.7  12/2015 HbA1c was 6.8     BG Diary:8/8/2017  In the AM: 189, 219, 175, 225, 183, 218, 218, 212  Before Bed: 136, 165, 126, 160      Weight: 193 today in the office    1. Dyslipidemia associated with type 2 diabetes mellitus (CMS-HCC)    Now on: 145, 117, 153, 137  1.  Synjardy 12.5/1000 one in the AM one in the PM  2.  Trulicity 1.5 once a week     Was having lows with Amaryl      ROS:   Constitutional: No night sweats.  Eyes:  No visual changes.  Cardiac: No chest pain, No palpitations or racing heart rate.  Resp: No shortness of breath, No cough,   Gi: No Diarrhea    All other systems were reviewed and were/are negative.  The ROS was revised/revisited during this office visit from the patients first office visit with me on2/6/17 Please review the full ROS during the first office visit.    Past Medical History:  Patient Active Problem List    Diagnosis Date Noted   • S/P CABG (coronary artery bypass graft) 12/20/2016     Priority: Medium   • CAD (coronary artery disease), native coronary artery 04/16/2012     Priority: Medium   • Dyslipidemia associated with type 2 diabetes mellitus (CMS-HCC) 04/16/2012     Priority: Medium   • Situational anxiety 10/11/2016     Priority: Low   • Erectile dysfunction 02/24/2014     Priority: Low   • Calcifying tendinitis of shoulder 04/02/2013     Priority: Low   • Bicipital tenosynovitis 04/02/2013     Priority: Low       Past Surgical History:  Past Surgical History:   Procedure Laterality Date   •  MULTIPLE CORONARY ARTERY BYPASS ENDO VEIN HARVEST  12/6/2016    Procedure: MULTIPLE CORONARY ARTERY BYPASS ENDO VEIN HARVEST X3 WITH LAITH;  Surgeon: Juan Pablo Schmitz M.D.;  Location: SURGERY [facility];  Service:    • SHOULDER ARTHROSCOPY W/ ROTATOR CUFF REPAIR  4/2/2013    Performed by Angelique [facility]ero M.D. at [facility]   • SHOULDER DECOMPRESSION ARTHROSCOPIC  4/2/2013    Performed by Angelique Romero M.D. at [facility]   • JOINT INJECTION DIAGNOSTIC  4/2/2013    Performed by Angelique Romero M.D. at [facility]   • CLAVICLE DISTAL EXCISION  4/2/2013    Performed by Angelique Romero M.D. at [facility]   • SHOULDER ARTHROSCOPY W/ BICIPITAL TENODESIS REPAIR  4/2/2013    Performed by Angelique Romero M.D. at [facility]   • STENT PLACEMENT  2008    x3   • DENTAL EXTRACTION(S)  1997    wisdom teeth   • KNEE ARTHROSCOPY  1983    left   • PATELLA ORIF  1980    bilateral   • CARDIAC CATH         Allergies:  Metoprolol    Social History:  Social History     Social History   • Marital status:      Spouse name: N/A   • Number of children: 1   • Years of education: N/A     Occupational History   • Contractor Other     Social History Main Topics   • Smoking status: Never Smoker   • Smokeless tobacco: Never Used   • Alcohol use 3.0 oz/week     6 Cans of beer per week      Comment: 2 per week   • Drug use: No      Comment: Former Marijuana usage   • Sexual activity: Yes     Partners: Female     Other Topics Concern   • Not on file     Social History Narrative   • No narrative on file       Family History:  Family History   Problem Relation Age of Onset   • Adopted: Yes   • Heart Attack Neg Hx    • Heart Disease Neg Hx        Medications:    Current Outpatient Prescriptions:   •  Dulaglutide (TRULICITY) 1.5 MG/0.5ML Solution Pen-injector, Inject 1 Each as instructed every 7 days. On Mon, Disp: 12 PEN, Rfl: 3  •  alprazolam (XANAX) 0.25 MG  "Tab, Take 1 Tab by mouth at bedtime as needed for Anxiety., Disp: 20 Tab, Rfl: 0  •  rosuvastatin (CRESTOR) 20 MG Tab, Take 1 Tab by mouth every bedtime., Disp: 90 Tab, Rfl: 3  •  aspirin EC (ECOTRIN) 81 MG Tablet Delayed Response, Take 81 mg by mouth every day., Disp: , Rfl:   •  vitamin D (CHOLECALCIFEROL) 1000 UNIT TABS, Take 1,000 Units by mouth 2 Times a Day., Disp: , Rfl:   •  Canagliflozin-Metformin HCl -1000 MG TABLET SR 24 HR, Take 1 tablet by mouth 2 Times a Day., Disp: 60 Tab, Rfl: 11        Physical Examination:   Vital signs: /78   Pulse 80   Ht 1.816 m (5' 11.5\")   Wt 87.5 kg (193 lb)   SpO2 97%   BMI 26.54 kg/m²   General: No distress, cooperative, well dressed and well nourished.   Eyes: No scleral icterus or discharge, No hyposphagma  ENMT: Normal on external inspection of nose, lips, No nasal drainage   Neck: No abnormal masses on inspection  Resp: Normal effort, Bilateral clear to auscultation, No wheezing, No rales  CVS: Regular rate and rhythm, S1 S2 normal, No murmur. No gallop  Extremities: No edema bilateral extremities  Neuro: Alert and oriented  Skin: No rash, No Ulcers  Psych: Normal mood and affect      Assessment and Plan:    1. Dyslipidemia associated with type 2 diabetes mellitus (CMS-HCC)    Now on: 145, 117, 153, 137  1.  Synjardy 12.5/1000 one in the AM one in the PM  2.  Trulicity 1.5 once a week    Return in about 6 months (around 6/11/2018).    Blood glucose log: Check BG in the morning when wake up, before lunch or dinner and before bed.  So three times a day.  Always bring BG diary to the next office visit.     Thank you kindly for allowing me to participate in the diabetes care plan for this patient.    Aly Turcios PA-C, BC-ADM  Board Certified - Advanced Diabetes Management  12/11/17    CC:   Jen Ortiz P.A.-C.    "

## 2018-01-02 DIAGNOSIS — E78.5 DYSLIPIDEMIA: ICD-10-CM

## 2018-01-02 DIAGNOSIS — I25.759 CORONARY ARTERY DISEASE INVOLVING NATIVE ARTERY OF TRANSPLANTED HEART WITH ANGINA PECTORIS (HCC): ICD-10-CM

## 2018-01-03 RX ORDER — ROSUVASTATIN CALCIUM 20 MG/1
20 TABLET, COATED ORAL
Qty: 90 TAB | Refills: 3 | Status: SHIPPED | OUTPATIENT
Start: 2018-01-03 | End: 2019-03-09 | Stop reason: SDUPTHER

## 2018-05-21 DIAGNOSIS — E11.9 TYPE 2 DIABETES MELLITUS WITHOUT COMPLICATION, WITHOUT LONG-TERM CURRENT USE OF INSULIN (HCC): ICD-10-CM

## 2018-05-21 NOTE — TELEPHONE ENCOUNTER
Was the patient seen in the last year in this department? Yes     Does patient have an active prescription for medications requested? No     Received Request Via: Patient     Patient needs refill on trulicity sent to pharmacy

## 2018-06-11 ENCOUNTER — OFFICE VISIT (OUTPATIENT)
Dept: ENDOCRINOLOGY | Facility: MEDICAL CENTER | Age: 58
End: 2018-06-11
Payer: COMMERCIAL

## 2018-06-11 VITALS
SYSTOLIC BLOOD PRESSURE: 124 MMHG | BODY MASS INDEX: 25.41 KG/M2 | HEIGHT: 72 IN | DIASTOLIC BLOOD PRESSURE: 70 MMHG | OXYGEN SATURATION: 97 % | WEIGHT: 187.6 LBS | RESPIRATION RATE: 18 BRPM | HEART RATE: 84 BPM

## 2018-06-11 DIAGNOSIS — I25.10 CORONARY ARTERY DISEASE INVOLVING NATIVE CORONARY ARTERY OF NATIVE HEART WITHOUT ANGINA PECTORIS: Chronic | ICD-10-CM

## 2018-06-11 DIAGNOSIS — E11.69 DYSLIPIDEMIA ASSOCIATED WITH TYPE 2 DIABETES MELLITUS (HCC): ICD-10-CM

## 2018-06-11 DIAGNOSIS — E11.9 TYPE 2 DIABETES MELLITUS WITHOUT COMPLICATION, WITHOUT LONG-TERM CURRENT USE OF INSULIN (HCC): ICD-10-CM

## 2018-06-11 DIAGNOSIS — E78.5 DYSLIPIDEMIA ASSOCIATED WITH TYPE 2 DIABETES MELLITUS (HCC): ICD-10-CM

## 2018-06-11 LAB
HBA1C MFR BLD: 6.6 % (ref ?–5.8)
INT CON NEG: NORMAL
INT CON POS: NORMAL

## 2018-06-11 PROCEDURE — 99214 OFFICE O/P EST MOD 30 MIN: CPT | Performed by: PHYSICIAN ASSISTANT

## 2018-06-11 PROCEDURE — 83036 HEMOGLOBIN GLYCOSYLATED A1C: CPT | Performed by: PHYSICIAN ASSISTANT

## 2018-06-11 NOTE — PROGRESS NOTES
Return to office Patient Consult Note  Referred by: Jen Ortiz P.A.-C.    Reason for consult: Diabetes Management Type 2    HPI:  Hu Santacruz is a 58 y.o. old patient who is seeing us today for diabetes care.  This is a pleasant patient with diabetes and I appreciate the opportunity to participate in the care of this patient.    BG Diary:6/11/2018  In the AM:  No log today    BG Diary:12/11/2017  In the AM: 145, 117, 153, 137  Before Bed: 113, 168, 107, 110, 156     6/11/18 HbA1c is 6.6  8/8/17 HbA1c is 7.1 in the office  2/2017 HbA1c was 5.7  12/2015 HbA1c was 6.8     BG Diary:8/8/2017  In the AM: 189, 219, 175, 225, 183, 218, 218, 212  Before Bed: 136, 165, 126, 160      Weight: 193 today in the office    1. Dyslipidemia associated with type 2 diabetes mellitus (HCC)    Now on:  1.  Invokamet  150/1000 one in the AM one in the PM  2.  Trulicity 1.5 once a week     Was having lows with Amaryl     2. Coronary artery disease involving native coronary artery of native heart without angina pectoris  He is doing well no recurrent events in the last one year      ROS:   Constitutional: No night sweats.  Eyes:  No visual changes.  Cardiac: No chest pain, No palpitations or racing heart rate.  Resp: No shortness of breath, No cough,   Gi: No Diarrhea    All other systems were reviewed and were/are negative.  The ROS was revised/revisited during this office visit from the patients first office visit with me on 2/6/17 Please review the full ROS during the first office visit.    Past Medical History:  Patient Active Problem List    Diagnosis Date Noted   • S/P CABG (coronary artery bypass graft) 12/20/2016     Priority: Medium   • CAD (coronary artery disease), native coronary artery 04/16/2012     Priority: Medium   • Dyslipidemia associated with type 2 diabetes mellitus (HCC) 04/16/2012     Priority: Medium   • Situational anxiety 10/11/2016     Priority: Low   • Erectile dysfunction 02/24/2014     Priority: Low    • Calcifying tendinitis of shoulder 04/02/2013     Priority: Low   • Bicipital tenosynovitis 04/02/2013     Priority: Low       Past Surgical History:  Past Surgical History:   Procedure Laterality Date   • MULTIPLE CORONARY ARTERY BYPASS ENDO VEIN HARVEST  12/6/2016    Procedure: MULTIPLE CORONARY ARTERY BYPASS ENDO VEIN HARVEST X3 WITH LAITH;  Surgeon: Juan Pablo Schmitz M.D.;  Location: SURGERY [facility];  Service:    • SHOULDER ARTHROSCOPY W/ ROTATOR CUFF REPAIR  4/2/2013    Performed by Angelique Romero M.D. at [facility]   • SHOULDER DECOMPRESSION ARTHROSCOPIC  4/2/2013    Performed by Angelique Romreo M.D. at [facility]   • JOINT INJECTION DIAGNOSTIC  4/2/2013    Performed by Angelique Romero M.D. at [facility]   • CLAVICLE DISTAL EXCISION  4/2/2013    Performed by Angelique Romero M.D. at [facility]   • SHOULDER ARTHROSCOPY W/ BICIPITAL TENODESIS REPAIR  4/2/2013    Performed by Angelique Romero M.D. 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   • Smoking status: Never Smoker   • Smokeless tobacco: Never Used   • Alcohol use 3.0 oz/week     6 Cans of beer per week      Comment: 2 per week   • Drug use: No      Comment: Former Marijuana usage   • Sexual activity: Yes     Partners: Female     Other Topics Concern   • Not on file     Social History Narrative   • No narrative on file       Family History:  Family History   Problem Relation Age of Onset   • Adopted: Yes   • Heart Attack Neg Hx    • Heart Disease Neg Hx   "      Medications:    Current Outpatient Prescriptions:   •  Canagliflozin-Metformin HCl -1000 MG TABLET SR 24 HR, Take 1 tablet by mouth 2 Times a Day., Disp: 60 Tab, Rfl: 11  •  Dulaglutide (TRULICITY) 1.5 MG/0.5ML Solution Pen-injector, Inject 1 Each as instructed every 7 days. On Mon, Disp: 4 PEN, Rfl: 11  •  rosuvastatin (CRESTOR) 20 MG Tab, Take 1 Tab by mouth every bedtime., Disp: 90 Tab, Rfl: 3  •  alprazolam (XANAX) 0.25 MG Tab, Take 1 Tab by mouth at bedtime as needed for Anxiety., Disp: 20 Tab, Rfl: 0  •  aspirin EC (ECOTRIN) 81 MG Tablet Delayed Response, Take 81 mg by mouth every day., Disp: , Rfl:   •  vitamin D (CHOLECALCIFEROL) 1000 UNIT TABS, Take 1,000 Units by mouth 2 Times a Day., Disp: , Rfl:         Physical Examination:   Vital signs: /70   Pulse 84   Resp 18   Ht 1.816 m (5' 11.5\")   Wt 85.1 kg (187 lb 9.6 oz)   SpO2 97%   BMI 25.80 kg/m²   General: No distress, cooperative, well dressed and well nourished.   Eyes: No scleral icterus or discharge, No hyposphagma  ENMT: Normal on external inspection of nose, lips, No nasal drainage   Neck: No abnormal masses on inspection  Resp: Normal effort, Bilateral clear to auscultation, No wheezing, No rales  CVS: Regular rate and rhythm, S1 S2 normal, No murmur. No gallop  Extremities: No edema bilateral extremities  Neuro: Alert and oriented  Skin: No rash, No Ulcers  Psych: Normal mood and affect      Assessment and Plan:    1. Dyslipidemia associated with type 2 diabetes mellitus (HCC)    Now on:  1.  Invokamet  150/1000 one in the AM one in the PM  2.  Trulicity 1.5 once a week     2. Coronary artery disease involving native coronary artery of native heart without angina pectoris  He has been stable over the last year      Return in about 6 months (around 12/11/2018).    Blood glucose log: Check BG in the morning when wake up, before lunch or dinner and before bed.  So three times a day.  Always bring BG diary to the next office " visit.     Thank you kindly for allowing me to participate in the diabetes care plan for this patient.    Aly Turcios PA-C, BC-[facility]  Board Certified - Advanced Diabetes Management  06/11/18    CC:   Jen Ortiz P.A.-C.

## 2018-12-11 ENCOUNTER — OFFICE VISIT (OUTPATIENT)
Dept: ENDOCRINOLOGY | Facility: MEDICAL CENTER | Age: 58
End: 2018-12-11
Payer: COMMERCIAL

## 2018-12-11 VITALS
SYSTOLIC BLOOD PRESSURE: 112 MMHG | DIASTOLIC BLOOD PRESSURE: 68 MMHG | BODY MASS INDEX: 27.02 KG/M2 | HEART RATE: 86 BPM | HEIGHT: 71 IN | WEIGHT: 193 LBS | OXYGEN SATURATION: 98 %

## 2018-12-11 DIAGNOSIS — E11.9 TYPE 2 DIABETES MELLITUS WITHOUT COMPLICATION, WITHOUT LONG-TERM CURRENT USE OF INSULIN (HCC): ICD-10-CM

## 2018-12-11 DIAGNOSIS — E11.69 DYSLIPIDEMIA ASSOCIATED WITH TYPE 2 DIABETES MELLITUS (HCC): ICD-10-CM

## 2018-12-11 DIAGNOSIS — E78.5 DYSLIPIDEMIA ASSOCIATED WITH TYPE 2 DIABETES MELLITUS (HCC): ICD-10-CM

## 2018-12-11 LAB
HBA1C MFR BLD: 7.3 % (ref ?–5.8)
INT CON NEG: NORMAL
INT CON POS: NORMAL

## 2018-12-11 PROCEDURE — 83036 HEMOGLOBIN GLYCOSYLATED A1C: CPT | Performed by: PHYSICIAN ASSISTANT

## 2018-12-11 PROCEDURE — 99214 OFFICE O/P EST MOD 30 MIN: CPT | Performed by: PHYSICIAN ASSISTANT

## 2018-12-11 NOTE — PROGRESS NOTES
Return to office Patient Consult Note  Referred by: Jen Ortiz P.A.-C.    Reason for consult: Diabetes Management Type 2    HPI:  Hu Santacruz is a 58 y.o. old patient who is seeing us today for diabetes care.  This is a pleasant patient with diabetes and I appreciate the opportunity to participate in the care of this patient.    BG Diary:12/11/2018  In the AM: 165, 141, 122, 119, 181, 85, 145, 99, 120  Before meal:  Before Bed:    BG Diary:6/11/2018  In the AM:  No log today     BG Diary:12/11/2017  In the AM: 145, 117, 153, 137  Before Bed: 113, 168, 107, 110, 156     12/11/18 hbA1c is 7.3  6/11/18 HbA1c is 6.6  8/8/17 HbA1c is 7.1 in the office  2/2017 HbA1c was 5.7  12/2015 HbA1c was 6.8     BG Diary:8/8/2017  In the AM: 189, 219, 175, 225, 183, 218, 218, 212  Before Bed: 136, 165, 126, 160      Weight: 193 today in the office      1. Dyslipidemia associated with type 2 diabetes mellitus (HCC)  Now on:  1.  Invokamet  150/1000 one in the AM one in the PM  2.  Trulicity 1.5 once a week     Was having lows with Amaryl  Now all lows have stopped      ROS:   Constitutional: No night sweats.  Eyes:  No visual changes.  Cardiac: No chest pain, No palpitations or racing heart rate.  Resp: No shortness of breath, No cough,   Gi: No Diarrhea    All other systems were reviewed and were/are negative.  The ROS was revised/revisited during this office visit from the patients first office visit with me on 2/6/17 . Please review the full ROS during the first office visit.    Past Medical History:  Patient Active Problem List    Diagnosis Date Noted   • S/P CABG (coronary artery bypass graft) 12/20/2016     Priority: Medium   • CAD (coronary artery disease), native coronary artery 04/16/2012     Priority: Medium   • Dyslipidemia associated with type 2 diabetes mellitus (HCC) 04/16/2012     Priority: Medium   • Situational anxiety 10/11/2016     Priority: Low   • Erectile dysfunction 02/24/2014     Priority: Low   •  Calcifying tendinitis of shoulder 04/02/2013     Priority: Low   • Bicipital tenosynovitis 04/02/2013     Priority: Low       Past Surgical History:  Past Surgical History:   Procedure Laterality Date   • MULTIPLE CORONARY ARTERY BYPASS ENDO VEIN HARVEST  12/6/2016    Procedure: MULTIPLE CORONARY ARTERY BYPASS ENDO VEIN HARVEST X3 WITH LAITH;  Surgeon: Juan Pablo Schmitz M.D.;  Location: SURGERY [facility];  Service:    • SHOULDER ARTHROSCOPY W/ ROTATOR CUFF REPAIR  4/2/2013    Performed by Angelique Romero M.D. at [facility]   • SHOULDER DECOMPRESSION ARTHROSCOPIC  4/2/2013    Performed by Angelique Romero M.D. at [facility]   • JOINT INJECTION DIAGNOSTIC  4/2/2013    Performed by Angelique Romero M.D. at [facility]   • CLAVICLE DISTAL EXCISION  4/2/2013    Performed by Angelique Romero M.D. at [facility]   • SHOULDER ARTHROSCOPY W/ BICIPITAL TENODESIS REPAIR  4/2/2013    Performed by Angelique Romero M.D. at [facility]   • STENT PLACEMENT  2008    x3   • DENTAL EXTRACTION(S)  1997    wisdom teeth   • KNEE ARTHROSCOPY  1983    left   • PATELLA ORIF  1980    bilateral   • CARDIAC CATH         Allergies:  Metoprolol    Social History:  Social History     Social History   • Marital status:      Spouse name: N/A   • Number of children: 1   • Years of education: N/A     Occupational History   • Contractor Other     Social History Main Topics   • Smoking status: Never Smoker   • Smokeless tobacco: Never Used   • Alcohol use 3.0 oz/week     6 Cans of beer per week      Comment: 2 per week   • Drug use: No      Comment: Former Marijuana usage   • Sexual activity: Yes     Partners: Female     Other Topics Concern   • Not on file     Social History Narrative   • No narrative on file       Family History:  Family History   Problem Relation Age of Onset   • Adopted: Yes   • Heart Attack Neg Hx    • Heart Disease Neg Hx   "      Medications:    Current Outpatient Prescriptions:   •  Dulaglutide (TRULICITY) 1.5 MG/0.5ML Solution Pen-injector, Inject 1 Each as instructed every 7 days. On Mon, Disp: 4 PEN, Rfl: 11  •  Canagliflozin-Metformin HCl -1000 MG TABLET SR 24 HR, Take 1 tablet by mouth 2 Times a Day., Disp: 60 Tab, Rfl: 11  •  rosuvastatin (CRESTOR) 20 MG Tab, Take 1 Tab by mouth every bedtime., Disp: 90 Tab, Rfl: 3  •  aspirin EC (ECOTRIN) 81 MG Tablet Delayed Response, Take 81 mg by mouth every day., Disp: , Rfl:   •  vitamin D (CHOLECALCIFEROL) 1000 UNIT TABS, Take 1,000 Units by mouth 2 Times a Day., Disp: , Rfl:   •  alprazolam (XANAX) 0.25 MG Tab, Take 1 Tab by mouth at bedtime as needed for Anxiety. (Patient not taking: Reported on 12/11/2018), Disp: 20 Tab, Rfl: 0      Physical Examination:   Vital signs: /68 (BP Location: Left arm, Patient Position: Sitting, BP Cuff Size: Adult)   Pulse 86   Ht 1.816 m (5' 11.5\")   Wt 87.5 kg (193 lb)   SpO2 98%   BMI 26.55 kg/m²   General: No distress, cooperative, well dressed and well nourished.   Eyes: No scleral icterus or discharge, No hyposphagma  ENMT: Normal on external inspection of nose, lips, No nasal drainage   Neck: No abnormal masses on inspection  Resp: Normal effort, Bilateral clear to auscultation, No wheezing, No rales  CVS: Regular rate and rhythm, S1 S2 normal, No murmur. No gallop  Extremities: No edema bilateral extremities  Neuro: Alert and oriented  Skin: No rash, No Ulcers  Psych: Normal mood and affect      Assessment and Plan:    1. Dyslipidemia associated with type 2 diabetes mellitus (HCC)   Now on:  1.  Invokamet  150/1000 one in the AM one in the PM  2.  Trulicity 1.5 once a week    Return in about 3 months (around 3/11/2019).    Blood glucose log: Check BG in the morning when wake up, before lunch or dinner and before bed.  So three times a day.  Always bring BG diary to the next office visit.       Thank you kindly for allowing me to " participate in the diabetes care plan for this patient.    Aly Turcios PA-C, BC-ADM  Board Certified - Advanced Diabetes Management  12/11/18    CC:   Jen Ortiz P.A.-C.

## 2019-02-04 ENCOUNTER — TELEPHONE (OUTPATIENT)
Dept: ENDOCRINOLOGY | Facility: MEDICAL CENTER | Age: 59
End: 2019-02-04

## 2019-02-04 NOTE — TELEPHONE ENCOUNTER
1. Caller Name: Hu Santacruz                                         Call Back Number: [phone] (home)       Patient approves a detailed voicemail message: yes    Pt question regarding PA denial. He spoke to the insurance and they said they sent us information on the denial and what would be covered under his insurance that is simular to his current drug. Told Pt i will look into it and give him a call back when i have more information.

## 2019-02-05 RX ORDER — EMPAGLIFLOZIN AND METFORMIN HYDROCHLORIDE 12.5; 1 MG/1; MG/1
1 TABLET ORAL 2 TIMES DAILY
Qty: 60 TAB | Refills: 7 | Status: SHIPPED | OUTPATIENT
Start: 2019-02-05 | End: 2019-02-05

## 2019-02-05 RX ORDER — DAPAGLIFLOZIN AND METFORMIN HYDROCHLORIDE 5; 1000 MG/1; MG/1
1 TABLET, FILM COATED, EXTENDED RELEASE ORAL 2 TIMES DAILY
Qty: 60 TAB | Refills: 11 | Status: SHIPPED | OUTPATIENT
Start: 2019-02-05 | End: 2019-09-10 | Stop reason: SDUPTHER

## 2019-02-05 NOTE — TELEPHONE ENCOUNTER
Pt contacted his insurance and got information on what is covered. Pt sent a [employer] message to Aly. A new Rx has been sent in.

## 2019-03-09 DIAGNOSIS — I25.759 CORONARY ARTERY DISEASE INVOLVING NATIVE ARTERY OF TRANSPLANTED HEART WITH ANGINA PECTORIS (HCC): ICD-10-CM

## 2019-03-09 DIAGNOSIS — E78.5 DYSLIPIDEMIA: ICD-10-CM

## 2019-03-11 RX ORDER — ROSUVASTATIN CALCIUM 20 MG/1
20 TABLET, COATED ORAL
Qty: 90 TAB | Refills: 0 | Status: SHIPPED | OUTPATIENT
Start: 2019-03-11 | End: 2019-04-15 | Stop reason: SDUPTHER

## 2019-03-21 ENCOUNTER — TELEPHONE (OUTPATIENT)
Dept: MEDICAL GROUP | Age: 59
End: 2019-03-21

## 2019-03-21 DIAGNOSIS — E11.69 DYSLIPIDEMIA ASSOCIATED WITH TYPE 2 DIABETES MELLITUS (HCC): ICD-10-CM

## 2019-03-21 DIAGNOSIS — I25.10 CORONARY ARTERY DISEASE INVOLVING NATIVE CORONARY ARTERY OF NATIVE HEART WITHOUT ANGINA PECTORIS: Chronic | ICD-10-CM

## 2019-03-21 DIAGNOSIS — E78.5 DYSLIPIDEMIA ASSOCIATED WITH TYPE 2 DIABETES MELLITUS (HCC): ICD-10-CM

## 2019-03-21 DIAGNOSIS — Z95.1 S/P CABG (CORONARY ARTERY BYPASS GRAFT): ICD-10-CM

## 2019-03-21 NOTE — TELEPHONE ENCOUNTER
Phone Number Called: [phone] (home)       Message: Called and spoke to patient let him know of message below.     Left Message for patient to call back: N\A

## 2019-03-21 NOTE — TELEPHONE ENCOUNTER
1. Caller Name: Hu Santacruz  Call Back Number: [phone] (home)         Patient approves a detailed voicemail message: N\A    Patient states he would like to have lab work done prior to his next appointment with Jen 04/15.      Please advise

## 2019-03-21 NOTE — TELEPHONE ENCOUNTER
Please inform patient that fasting blood tests are ordered today.  Please advise him to do fasting blood test 1 week before follow-up appointment with his primary care provider, Jen Ortiz PA-C on 4/15/19.    Jocelyn Feng M.D.

## 2019-04-02 ENCOUNTER — HOSPITAL ENCOUNTER (OUTPATIENT)
Dept: LAB | Facility: MEDICAL CENTER | Age: 59
End: 2019-04-02
Attending: INTERNAL MEDICINE
Payer: COMMERCIAL

## 2019-04-02 DIAGNOSIS — I25.10 CORONARY ARTERY DISEASE INVOLVING NATIVE CORONARY ARTERY OF NATIVE HEART WITHOUT ANGINA PECTORIS: Chronic | ICD-10-CM

## 2019-04-02 DIAGNOSIS — Z95.1 S/P CABG (CORONARY ARTERY BYPASS GRAFT): ICD-10-CM

## 2019-04-02 DIAGNOSIS — E78.5 DYSLIPIDEMIA ASSOCIATED WITH TYPE 2 DIABETES MELLITUS (HCC): ICD-10-CM

## 2019-04-02 DIAGNOSIS — E11.69 DYSLIPIDEMIA ASSOCIATED WITH TYPE 2 DIABETES MELLITUS (HCC): ICD-10-CM

## 2019-04-02 LAB
ALBUMIN SERPL BCP-MCNC: 4.4 G/DL (ref 3.2–4.9)
ALBUMIN/GLOB SERPL: 2.3 G/DL
ALP SERPL-CCNC: 59 U/L (ref 30–99)
ALT SERPL-CCNC: 28 U/L (ref 2–50)
ANION GAP SERPL CALC-SCNC: 9 MMOL/L (ref 0–11.9)
AST SERPL-CCNC: 19 U/L (ref 12–45)
BASOPHILS # BLD AUTO: 0.6 % (ref 0–1.8)
BASOPHILS # BLD: 0.04 K/UL (ref 0–0.12)
BILIRUB SERPL-MCNC: 0.6 MG/DL (ref 0.1–1.5)
BUN SERPL-MCNC: 15 MG/DL (ref 8–22)
CALCIUM SERPL-MCNC: 9.5 MG/DL (ref 8.5–10.5)
CHLORIDE SERPL-SCNC: 102 MMOL/L (ref 96–112)
CHOLEST SERPL-MCNC: 161 MG/DL (ref 100–199)
CO2 SERPL-SCNC: 28 MMOL/L (ref 20–33)
CREAT SERPL-MCNC: 0.97 MG/DL (ref 0.5–1.4)
CREAT UR-MCNC: 78.5 MG/DL
EOSINOPHIL # BLD AUTO: 0.1 K/UL (ref 0–0.51)
EOSINOPHIL NFR BLD: 1.5 % (ref 0–6.9)
ERYTHROCYTE [DISTWIDTH] IN BLOOD BY AUTOMATED COUNT: 42.3 FL (ref 35.9–50)
EST. AVERAGE GLUCOSE BLD GHB EST-MCNC: 166 MG/DL
FASTING STATUS PATIENT QL REPORTED: NORMAL
GLOBULIN SER CALC-MCNC: 1.9 G/DL (ref 1.9–3.5)
GLUCOSE SERPL-MCNC: 190 MG/DL (ref 65–99)
HBA1C MFR BLD: 7.4 % (ref 0–5.6)
HCT VFR BLD AUTO: 51.5 % (ref 42–52)
HDLC SERPL-MCNC: 45 MG/DL
HGB BLD-MCNC: 17.3 G/DL (ref 14–18)
IMM GRANULOCYTES # BLD AUTO: 0.02 K/UL (ref 0–0.11)
IMM GRANULOCYTES NFR BLD AUTO: 0.3 % (ref 0–0.9)
LDLC SERPL CALC-MCNC: 84 MG/DL
LYMPHOCYTES # BLD AUTO: 1.7 K/UL (ref 1–4.8)
LYMPHOCYTES NFR BLD: 24.7 % (ref 22–41)
MCH RBC QN AUTO: 31.9 PG (ref 27–33)
MCHC RBC AUTO-ENTMCNC: 33.6 G/DL (ref 33.7–35.3)
MCV RBC AUTO: 94.8 FL (ref 81.4–97.8)
MICROALBUMIN UR-MCNC: <0.7 MG/DL
MICROALBUMIN/CREAT UR: NORMAL MG/G (ref 0–30)
MONOCYTES # BLD AUTO: 0.52 K/UL (ref 0–0.85)
MONOCYTES NFR BLD AUTO: 7.6 % (ref 0–13.4)
NEUTROPHILS # BLD AUTO: 4.49 K/UL (ref 1.82–7.42)
NEUTROPHILS NFR BLD: 65.3 % (ref 44–72)
NRBC # BLD AUTO: 0 K/UL
NRBC BLD-RTO: 0 /100 WBC
PLATELET # BLD AUTO: 241 K/UL (ref 164–446)
PMV BLD AUTO: 9.5 FL (ref 9–12.9)
POTASSIUM SERPL-SCNC: 4.4 MMOL/L (ref 3.6–5.5)
PROT SERPL-MCNC: 6.3 G/DL (ref 6–8.2)
RBC # BLD AUTO: 5.43 M/UL (ref 4.7–6.1)
SODIUM SERPL-SCNC: 139 MMOL/L (ref 135–145)
T4 FREE SERPL-MCNC: 0.92 NG/DL (ref 0.53–1.43)
TRIGL SERPL-MCNC: 158 MG/DL (ref 0–149)
TSH SERPL DL<=0.005 MIU/L-ACNC: 0.99 UIU/ML (ref 0.38–5.33)
WBC # BLD AUTO: 6.9 K/UL (ref 4.8–10.8)

## 2019-04-02 PROCEDURE — 80061 LIPID PANEL: CPT

## 2019-04-02 PROCEDURE — 80053 COMPREHEN METABOLIC PANEL: CPT

## 2019-04-02 PROCEDURE — 83036 HEMOGLOBIN GLYCOSYLATED A1C: CPT

## 2019-04-02 PROCEDURE — 84439 ASSAY OF FREE THYROXINE: CPT

## 2019-04-02 PROCEDURE — 36415 COLL VENOUS BLD VENIPUNCTURE: CPT

## 2019-04-02 PROCEDURE — 84443 ASSAY THYROID STIM HORMONE: CPT

## 2019-04-02 PROCEDURE — 85025 COMPLETE CBC W/AUTO DIFF WBC: CPT

## 2019-04-02 PROCEDURE — 82043 UR ALBUMIN QUANTITATIVE: CPT

## 2019-04-02 PROCEDURE — 82570 ASSAY OF URINE CREATININE: CPT

## 2019-04-15 ENCOUNTER — OFFICE VISIT (OUTPATIENT)
Dept: MEDICAL GROUP | Age: 59
End: 2019-04-15
Payer: COMMERCIAL

## 2019-04-15 VITALS
HEART RATE: 72 BPM | WEIGHT: 195.2 LBS | DIASTOLIC BLOOD PRESSURE: 84 MMHG | SYSTOLIC BLOOD PRESSURE: 122 MMHG | TEMPERATURE: 97.6 F | OXYGEN SATURATION: 98 % | BODY MASS INDEX: 27.33 KG/M2 | HEIGHT: 71 IN

## 2019-04-15 DIAGNOSIS — E11.69 DYSLIPIDEMIA ASSOCIATED WITH TYPE 2 DIABETES MELLITUS (HCC): ICD-10-CM

## 2019-04-15 DIAGNOSIS — I25.10 CORONARY ARTERY DISEASE INVOLVING NATIVE CORONARY ARTERY OF NATIVE HEART WITHOUT ANGINA PECTORIS: Chronic | ICD-10-CM

## 2019-04-15 DIAGNOSIS — E78.5 DYSLIPIDEMIA ASSOCIATED WITH TYPE 2 DIABETES MELLITUS (HCC): ICD-10-CM

## 2019-04-15 PROCEDURE — 99396 PREV VISIT EST AGE 40-64: CPT | Performed by: PHYSICIAN ASSISTANT

## 2019-04-15 RX ORDER — ROSUVASTATIN CALCIUM 20 MG/1
20 TABLET, COATED ORAL
Qty: 90 TAB | Refills: 0 | Status: SHIPPED | OUTPATIENT
Start: 2019-04-15 | End: 2019-07-07 | Stop reason: SDUPTHER

## 2019-04-15 ASSESSMENT — PATIENT HEALTH QUESTIONNAIRE - PHQ9: CLINICAL INTERPRETATION OF PHQ2 SCORE: 0

## 2019-04-15 NOTE — PROGRESS NOTES
Subjective:     CC:   Chief Complaint   Patient presents with   • Diabetes     lab review       HPI:   Hu Santacruz is a 59 y.o. male who presents for an annual exam and lab review for his DM and CAD.    Last colonoscopy: 2014  Last Tdap:  2014  Regular exercise: not traditional exercise-- working a lot. Very physical labor.  Diet: Fair. Reports he was on an orange soda kick for a while drinking one every day.  He has now switched over to diet soda.  He says he is planning to work on his diet.    He has upcoming follow-up with cardiology and endocrinology this summer.  He has been taking his statin daily without any side effects and is tolerating well.  He is under the care of Endo and reports he has been changing his medications.  A1c still stays around 7.4%.  Reports he sees his eye professional every other year.  He is not interested in receiving a point-of-care retinal screening today.  He will be seeing his eye doctor in August.    He  has a past medical history of Adjustment disorder (4/16/2012); Back pain; CAD (coronary artery disease), native coronary artery (4/16/2012); Cold (2/21/13); DM (diabetes mellitus) (HCC) (2008); Hyperlipidemia (4/16/2012); Muscle disorder; Old myocardial infarction (2008); Pain; Situational anxiety (10/11/2016); and Snoring.  He  has a past surgical history that includes patella orif (1980); knee arthroscopy (1983); dental extraction(s) (1997); shoulder arthroscopy w/ rotator cuff repair (4/2/2013); shoulder decompression arthroscopic (4/2/2013); joint injection diagnostic (4/2/2013); clavicle distal excision (4/2/2013); shoulder arthroscopy w/ bicipital tenodesis repair (4/2/2013); stent placement (2008); multiple coronary artery bypass endo vein harvest (12/6/2016); and [facility] cardiac cath.  Family History   Problem Relation Age of Onset   • Adopted: Yes   • Heart Attack Neg Hx    • Heart Disease Neg Hx      Social History   Substance Use Topics   • Smoking status: Never  Smoker   • Smokeless tobacco: Never Used   • Alcohol use 3.0 oz/week     6 Cans of beer per week      Comment: 2 per week       Patient Active Problem List    Diagnosis Date Noted   • S/P CABG (coronary artery bypass graft) 12/20/2016     Priority: Medium   • CAD (coronary artery disease), native coronary artery 04/16/2012     Priority: Medium   • Dyslipidemia associated with type 2 diabetes mellitus (HCC) 04/16/2012     Priority: Medium   • Erectile dysfunction 02/24/2014     Priority: Low   • Calcifying tendinitis of shoulder 04/02/2013     Priority: Low   • Bicipital tenosynovitis 04/02/2013     Priority: Low       Current Outpatient Prescriptions   Medication Sig Dispense Refill   • rosuvastatin (CRESTOR) 20 MG Tab Take 1 Tab by mouth every bedtime. 90 Tab 0   • Dapagliflozin-Metformin HCl ER 5-1000 MG TABLET SR 24 HR Take 1 tablet by mouth 2 Times a Day. 60 Tab 11   • Dulaglutide (TRULICITY) 1.5 MG/0.5ML Solution Pen-injector Inject 1 Each as instructed every 7 days. On Mon 4 PEN 11   • aspirin EC (ECOTRIN) 81 MG Tablet Delayed Response Take 81 mg by mouth every day.     • vitamin D (CHOLECALCIFEROL) 1000 UNIT TABS Take 1,000 Units by mouth 2 Times a Day.       No current facility-administered medications for this visit.     (including changes today)  Allergies: Metoprolol    Review of Systems    Constitutional: Negative for fever, chills and malaise/fatigue.   HENT: Negative for congestion.    Eyes: Negative for pain.   Respiratory: Negative for cough and shortness of breath.    Cardiovascular: Negative for leg swelling.   Gastrointestinal: Negative for nausea, vomiting, abdominal pain and diarrhea.   Genitourinary: Negative for dysuria and hematuria.   Skin: Negative for rash.   Neurological: Negative for dizziness, focal weakness and headaches.   Endo/Heme/Allergies: Does not bruise/bleed easily.   Psychiatric/Behavioral: Negative for depression.  The patient is not nervous/anxious.      Objective:  "    Vitals:    04/15/19 0809   BP: 122/84   BP Location: Left arm   Patient Position: Sitting   BP Cuff Size: Adult   Pulse: 72   Temp: 36.4 °C (97.6 °F)   TempSrc: Temporal   SpO2: 98%   Weight: 88.5 kg (195 lb 3.2 oz)   Height: 1.803 m (5' 11\")     Body mass index is 27.22 kg/m².   Wt Readings from Last 4 Encounters:   04/15/19 88.5 kg (195 lb 3.2 oz)   12/11/18 87.5 kg (193 lb)   06/11/18 85.1 kg (187 lb 9.6 oz)   12/11/17 87.5 kg (193 lb)       Physical Exam:  Constitutional: Well-developed and well-nourished. Not diaphoretic. No distress.   Skin: Skin is warm and dry. No rash noted.  Head: Atraumatic without lesions.  Eyes: Conjunctivae and extraocular motions are normal. Pupils are equal, round, and reactive to light. No scleral icterus.   Ears:  External ears unremarkable.   Neck: Supple, trachea midline. Normal range of motion. No thyromegaly present.No JVD. No lymphadenopathy--cervical or supraclavicular  Cardiovascular: Regular rate and rhythm, S1 and S2 without murmur, rubs, or gallops.    Chest: Effort normal. Clear to auscultation throughout. No adventitious sounds. No CVA tenderness.  Abdomen: Soft, non tender, and without distention. Active bowel sounds in all four quadrants. No rebound, guarding, masses or HSM.  Extremities: No cyanosis, clubbing, erythema, nor edema. Distal pulses intact and symmetric.   Neurological: Alert and oriented x 3.   Psychiatric:  Behavior, mood, and affect are appropriate.    Component      Latest Ref Rng & Units 4/2/2019   WBC      4.8 - 10.8 K/uL 6.9   RBC      4.70 - 6.10 M/uL 5.43   Hemoglobin      14.0 - 18.0 g/dL 17.3   Hematocrit      42.0 - 52.0 % 51.5   MCV      81.4 - 97.8 fL 94.8   MCH      27.0 - 33.0 pg 31.9   MCHC      33.7 - 35.3 g/dL 33.6 (L)   RDW      35.9 - 50.0 fL 42.3   Platelet Count      164 - 446 K/uL 241   MPV      9.0 - 12.9 fL 9.5   Neutrophils-Polys      44.00 - 72.00 % 65.30   Lymphocytes      22.00 - 41.00 % 24.70   Monocytes      0.00 - " 13.40 % 7.60   Eosinophils      0.00 - 6.90 % 1.50   Basophils      0.00 - 1.80 % 0.60   Immature Granulocytes      0.00 - 0.90 % 0.30   Nucleated RBC      /100 WBC 0.00   Neutrophils (Absolute)      1.82 - 7.42 K/uL 4.49   Lymphs (Absolute)      1.00 - 4.80 K/uL 1.70   Monos (Absolute)      0.00 - 0.85 K/uL 0.52   Eos (Absolute)      0.00 - 0.51 K/uL 0.10   Baso (Absolute)      0.00 - 0.12 K/uL 0.04   Immature Granulocytes (abs)      0.00 - 0.11 K/uL 0.02   NRBC (Absolute)      K/uL 0.00   Sodium      135 - 145 mmol/L 139   Potassium      3.6 - 5.5 mmol/L 4.4   Chloride      96 - 112 mmol/L 102   Co2      20 - 33 mmol/L 28   Anion Gap      0.0 - 11.9 9.0   Glucose      65 - 99 mg/dL 190 (H)   Bun      8 - 22 mg/dL 15   Creatinine      0.50 - 1.40 mg/dL 0.97   Calcium      8.5 - 10.5 mg/dL 9.5   AST(SGOT)      12 - 45 U/L 19   ALT(SGPT)      2 - 50 U/L 28   Alkaline Phosphatase      30 - 99 U/L 59   Total Bilirubin      0.1 - 1.5 mg/dL 0.6   Albumin      3.2 - 4.9 g/dL 4.4   Total Protein      6.0 - 8.2 g/dL 6.3   Globulin      1.9 - 3.5 g/dL 1.9   A-G Ratio      g/dL 2.3   Cholesterol,Tot      100 - 199 mg/dL 161   Triglycerides      0 - 149 mg/dL 158 (H)   HDL      >=40 mg/dL 45   LDL      <100 mg/dL 84   Creatinine, Urine      mg/dL 78.50   Microalbumin, Urine Random      mg/dL <0.7   Micro Alb Creat Ratio      0 - 30 mg/g see below   Glycohemoglobin      0.0 - 5.6 % 7.4 (H)   Estim. Avg Glu      mg/dL 166   GFR If African American      >60 mL/min/1.73 m 2 >60   GFR If Non African American      >60 mL/min/1.73 m 2 >60   TSH      0.380 - 5.330 uIU/mL 0.990   Free T-4      0.53 - 1.43 ng/dL 0.92   Fasting Status       Fasting   Reviewed and discussed above labs with patient in office.      Assessment and Plan:     1. Dyslipidemia associated with type 2 diabetes mellitus (HCC) -Stable. Continue current medicines. Monitor labs regularly. Follow-up with endo and cards as directed.  Declined monofilament in office  today. Discussed foot care at great length.   Encouraged annual eye exams. Reports he typically goes in to eye care professional every other year for new glasses. Advised in offset years to complete retinal screening with us or endo office. Pt agrees. He will be seeing his eye care professional this summer so defers today.  Labs ordered for follow-up in 6 mos. rosuvastatin (CRESTOR) 20 MG Tab    Comp Metabolic Panel    CBC WITH DIFFERENTIAL    Lipid Profile    HEMOGLOBIN A1C    MICROALBUMIN CREAT RATIO URINE   2. Coronary artery disease involving native coronary artery of native heart without angina pectoris -Stable. Continue current medicines. Monitor labs regularly. Follow-up with specialist as directed. Has follow-up scheduled with cardiology for this summer.   rosuvastatin (CRESTOR) 20 MG Tab    Comp Metabolic Panel       Labs per orders  Counseling about diet, exercise, skin care  Vaccinations per orders  HM: Declined eye exam-- seeing eye care professional this summer, Shingrix unavailable, and declined monofilament test.     Follow-up: Return in about 6 months (around 10/15/2019) for lab review.    This dictation was created using voice recognition software. The accuracy of the dictation is limited to the abilities of the software. I expect there may be some errors of grammar and possibly content. The MA notes were reviewed and certain aspects of this information were incorporated into this note.

## 2019-06-06 ENCOUNTER — OFFICE VISIT (OUTPATIENT)
Dept: ENDOCRINOLOGY | Facility: MEDICAL CENTER | Age: 59
End: 2019-06-06
Payer: COMMERCIAL

## 2019-06-06 VITALS
HEART RATE: 62 BPM | BODY MASS INDEX: 26.6 KG/M2 | DIASTOLIC BLOOD PRESSURE: 62 MMHG | OXYGEN SATURATION: 98 % | SYSTOLIC BLOOD PRESSURE: 106 MMHG | WEIGHT: 190 LBS | HEIGHT: 71 IN

## 2019-06-06 DIAGNOSIS — E78.5 DYSLIPIDEMIA ASSOCIATED WITH TYPE 2 DIABETES MELLITUS (HCC): ICD-10-CM

## 2019-06-06 DIAGNOSIS — E11.69 DYSLIPIDEMIA ASSOCIATED WITH TYPE 2 DIABETES MELLITUS (HCC): ICD-10-CM

## 2019-06-06 PROCEDURE — 99213 OFFICE O/P EST LOW 20 MIN: CPT | Performed by: PHYSICIAN ASSISTANT

## 2019-06-06 RX ORDER — PIOGLITAZONEHYDROCHLORIDE 30 MG/1
30 TABLET ORAL DAILY
Qty: 30 TAB | Refills: 11 | Status: SHIPPED | OUTPATIENT
Start: 2019-06-06 | End: 2019-09-10 | Stop reason: SDUPTHER

## 2019-06-06 NOTE — PROGRESS NOTES
Return to office Patient Consult Note  Referred by: Jen Ortiz P.A.-C.    Reason for consult: Diabetes Management Type 2    HPI:  Hu Santacruz is a 59 y.o. old patient who is seeing us today for diabetes care.  This is a pleasant patient with diabetes and I appreciate the opportunity to participate in the care of this patient.    Labs of 4/2/2019 HbA1c is 7.4  12/11/18 hbA1c is 7.3  6/11/18 HbA1c is 6.6  8/8/17 HbA1c is 7.1 in the office  2/2017 HbA1c was 5.7  12/2015 HbA1c was 6.8    BG Diary:6/6/2019  In the AM:  No log    1. Dyslipidemia associated with type 2 diabetes mellitus (HCC)    Now on:  1.  Xigduo 5/1000 one in the AM one in the PM  2.  Trulicity 1.5 once a week     Was having lows with Amaryl  Now all lows have stopped      ROS:   Constitutional: No night sweats.  Eyes:  No visual changes.  Cardiac: No chest pain, No palpitations or racing heart rate.  Resp: No shortness of breath, No cough,   Gi: No Diarrhea    All other systems were reviewed and were/are negative.      Past Medical History:  Patient Active Problem List    Diagnosis Date Noted   • S/P CABG (coronary artery bypass graft) 12/20/2016     Priority: Medium   • CAD (coronary artery disease), native coronary artery 04/16/2012     Priority: Medium   • Dyslipidemia associated with type 2 diabetes mellitus (HCC) 04/16/2012     Priority: Medium   • Erectile dysfunction 02/24/2014     Priority: Low   • Calcifying tendinitis of shoulder 04/02/2013     Priority: Low   • Bicipital tenosynovitis 04/02/2013     Priority: Low       Past Surgical History:  Past Surgical History:   Procedure Laterality Date   • MULTIPLE CORONARY ARTERY BYPASS ENDO VEIN HARVEST  12/6/2016    Procedure: MULTIPLE CORONARY ARTERY BYPASS ENDO VEIN HARVEST X3 WITH LAITH;  Surgeon: Juan Pablo Schmitz M.D.;  Location: [facility];  Service:    • SHOULDER ARTHROSCOPY W/ ROTATOR CUFF REPAIR  4/2/2013    Performed by Angelique Romero M.D. at [facility]  ORS   • SHOULDER DECOMPRESSION ARTHROSCOPIC  4/2/2013    Performed by Angelique Romero M.D. at SURGERY [facility] ORS   • JOINT INJECTION DIAGNOSTIC  4/2/2013    Performed by Angelique Romero M.D. at SURGERY [facility] ORS   • CLAVICLE DISTAL EXCISION  4/2/2013    Performed by Angelique Romero M.D. at [facility] ORS   • SHOULDER ARTHROSCOPY W/ BICIPITAL TENODESIS REPAIR  4/2/2013    Performed by Angelique Romero M.D. at SURGERY [facility] ORS   • STENT PLACEMENT  2008    x3   • DENTAL EXTRACTION(S)  1997    wisdom teeth   • KNEE ARTHROSCOPY  1983    left   • PATELLA ORIF  1980    bilateral   • ZZZ CARDIAC CATH         Allergies:  Metoprolol    Social History:  Social History     Social History   • Marital status:      Spouse name: N/A   • Number of children: 1   • Years of education: N/A     Occupational History   • Contractor Other     Social History Main Topics   • Smoking status: Never Smoker   • Smokeless tobacco: Never Used   • Alcohol use 3.0 oz/week     6 Cans of beer per week      Comment: 2 per week   • Drug use: No      Comment: Former Marijuana usage   • Sexual activity: Yes     Partners: Female     Other Topics Concern   • Not on file     Social History Narrative   • No narrative on file       Family History:  Family History   Problem Relation Age of Onset   • Adopted: Yes   • Heart Attack Neg Hx    • Heart Disease Neg Hx        Medications:    Current Outpatient Prescriptions:   •  rosuvastatin (CRESTOR) 20 MG Tab, Take 1 Tab by mouth every bedtime., Disp: 90 Tab, Rfl: 0  •  Dapagliflozin-Metformin HCl ER 5-1000 MG TABLET SR 24 HR, Take 1 tablet by mouth 2 Times a Day., Disp: 60 Tab, Rfl: 11  •  Dulaglutide (TRULICITY) 1.5 MG/0.5ML Solution Pen-injector, Inject 1 Each as instructed every 7 days. On Mon, Disp: 4 PEN, Rfl: 11  •  aspirin EC (ECOTRIN) 81 MG Tablet Delayed Response, Take 81 mg by mouth every day., Disp: , Rfl:   •  vitamin D (CHOLECALCIFEROL) 1000 UNIT TABS,  "Take 1,000 Units by mouth 2 Times a Day., Disp: , Rfl:         Physical Examination:   Vital signs: /62 (BP Location: Left arm, Patient Position: Sitting)   Pulse 62   Ht 1.803 m (5' 11\")   Wt 86.2 kg (190 lb)   SpO2 98%   BMI 26.50 kg/m²   General: No distress, cooperative, well dressed and well nourished.   Eyes: No scleral icterus or discharge, No hyposphagma  ENMT: Normal on external inspection of nose, lips, No nasal drainage   Neck: No abnormal masses on inspection  Resp: Normal effort, Bilateral clear to auscultation, No wheezing, No rales  CVS: Regular rate and rhythm, S1 S2 normal, No murmur. No gallop  Extremities: No edema bilateral extremities  Neuro: Alert and oriented  Skin: No rash, No Ulcers  Psych: Normal mood and affect      Assessment and Plan:    1. Dyslipidemia associated with type 2 diabetes mellitus (HCC)    Now on:  1.  Xigduo 5/1000 one in the AM one in the PM  2.  Trulicity 1.5 once a week  3.  Start on Actos 30mg one a day      Return in about 3 months (around 9/6/2019).      This patient during there office visit today was started on a new medication Actos .  Side effects of the new medication were discussed with the patient today in the office.       Thank you kindly for allowing me to participate in the diabetes care plan for this patient.    Aly Turcios PA-C, BC-ADM  Board Certified - Advanced Diabetes Management  06/06/19    CC:   Jen Ortiz P.A.-C.    "

## 2019-07-07 DIAGNOSIS — E11.69 DYSLIPIDEMIA ASSOCIATED WITH TYPE 2 DIABETES MELLITUS (HCC): ICD-10-CM

## 2019-07-07 DIAGNOSIS — E78.5 DYSLIPIDEMIA ASSOCIATED WITH TYPE 2 DIABETES MELLITUS (HCC): ICD-10-CM

## 2019-07-07 DIAGNOSIS — I25.10 CORONARY ARTERY DISEASE INVOLVING NATIVE CORONARY ARTERY OF NATIVE HEART WITHOUT ANGINA PECTORIS: Chronic | ICD-10-CM

## 2019-07-08 RX ORDER — ROSUVASTATIN CALCIUM 20 MG/1
TABLET, COATED ORAL
Qty: 90 TAB | Refills: 1 | Status: SHIPPED | OUTPATIENT
Start: 2019-07-08 | End: 2019-07-12 | Stop reason: SDUPTHER

## 2019-07-12 ENCOUNTER — OFFICE VISIT (OUTPATIENT)
Dept: CARDIOLOGY | Facility: MEDICAL CENTER | Age: 59
End: 2019-07-12
Payer: COMMERCIAL

## 2019-07-12 VITALS
BODY MASS INDEX: 26.88 KG/M2 | HEIGHT: 71 IN | HEART RATE: 84 BPM | WEIGHT: 192 LBS | SYSTOLIC BLOOD PRESSURE: 142 MMHG | OXYGEN SATURATION: 98 % | DIASTOLIC BLOOD PRESSURE: 80 MMHG

## 2019-07-12 DIAGNOSIS — E11.69 DYSLIPIDEMIA ASSOCIATED WITH TYPE 2 DIABETES MELLITUS (HCC): ICD-10-CM

## 2019-07-12 DIAGNOSIS — E78.5 DYSLIPIDEMIA ASSOCIATED WITH TYPE 2 DIABETES MELLITUS (HCC): ICD-10-CM

## 2019-07-12 DIAGNOSIS — Z95.1 S/P CABG (CORONARY ARTERY BYPASS GRAFT): ICD-10-CM

## 2019-07-12 DIAGNOSIS — I25.10 CORONARY ARTERY DISEASE INVOLVING NATIVE CORONARY ARTERY OF NATIVE HEART WITHOUT ANGINA PECTORIS: Chronic | ICD-10-CM

## 2019-07-12 PROCEDURE — 99214 OFFICE O/P EST MOD 30 MIN: CPT | Performed by: INTERNAL MEDICINE

## 2019-07-12 RX ORDER — ROSUVASTATIN CALCIUM 20 MG/1
20 TABLET, COATED ORAL
Qty: 90 TAB | Refills: 3 | Status: SHIPPED | OUTPATIENT
Start: 2019-07-12 | End: 2019-07-12 | Stop reason: SDUPTHER

## 2019-07-12 RX ORDER — SILDENAFIL 25 MG/1
25 TABLET, FILM COATED ORAL PRN
Qty: 30 TAB | Refills: 3 | Status: SHIPPED | OUTPATIENT
Start: 2019-07-12 | End: 2020-01-27

## 2019-07-12 RX ORDER — SILDENAFIL 25 MG/1
25 TABLET, FILM COATED ORAL PRN
Qty: 30 TAB | Refills: 3 | Status: SHIPPED | OUTPATIENT
Start: 2019-07-12 | End: 2019-07-12 | Stop reason: SDUPTHER

## 2019-07-12 RX ORDER — ROSUVASTATIN CALCIUM 20 MG/1
20 TABLET, COATED ORAL
Qty: 90 TAB | Refills: 3 | Status: SHIPPED | OUTPATIENT
Start: 2019-07-12 | End: 2020-02-11

## 2019-07-12 ASSESSMENT — ENCOUNTER SYMPTOMS
PALPITATIONS: 0
EYE DISCHARGE: 0
LOSS OF CONSCIOUSNESS: 0
MYALGIAS: 0
DEPRESSION: 0
BLURRED VISION: 0
BRUISES/BLEEDS EASILY: 0
EYE PAIN: 0
ABDOMINAL PAIN: 0
NAUSEA: 0
SHORTNESS OF BREATH: 0
WEIGHT LOSS: 0
VOMITING: 0
WHEEZING: 0
NERVOUS/ANXIOUS: 0
DIZZINESS: 0
COUGH: 0

## 2019-07-12 NOTE — PROGRESS NOTES
Chief Complaint   Patient presents with   • Coronary Artery Disease     follow up       Subjective:   Hu Santacruz is a 59 y.o. male who presents today in follow-up in regards to his CABG x3 in 2016 for angina and prior stents as well as hyperlipidemia    Doing exceedingly well pleased with his weight loss continues to go to the diabetes clinic and work on his sugars.  Work is good, enjoying his life compliant on his meds    Has issues now and again with his erectile dysfunction    Past Medical History:   Diagnosis Date   • Adjustment disorder 4/16/2012   • Back pain    • CAD (coronary artery disease), native coronary artery 4/16/2012    CABG X3 (LIMA-LAD, SVG-2nd diag and R-PDA)-12/2016   • Cold 2/21/13   • DM (diabetes mellitus) ([facility]) 2008    oral agent   • Hyperlipidemia 4/16/2012   • Muscle disorder    • Old myocardial infarction 2008    cardiologist; Dr. Olmstead   • Pain     right shoulder   • Situational anxiety 10/11/2016   • Snoring      Past Surgical History:   Procedure Laterality Date   • MULTIPLE CORONARY ARTERY BYPASS ENDO VEIN HARVEST  12/6/2016    Procedure: MULTIPLE CORONARY ARTERY BYPASS ENDO VEIN HARVEST X3 WITH LAITH;  Surgeon: Juan Pablo Schmitz M.D.;  Location: SURGERY [facility];  Service:    • SHOULDER ARTHROSCOPY W/ ROTATOR CUFF REPAIR  4/2/2013    Performed by Angelique Romero M.D. at [facility]   • SHOULDER DECOMPRESSION ARTHROSCOPIC  4/2/2013    Performed by Angelique Romero M.D. at [facility]   • JOINT INJECTION DIAGNOSTIC  4/2/2013    Performed by Angelique Romero M.D. at [facility]   • CLAVICLE DISTAL EXCISION  4/2/2013    Performed by Angelique Romero M.D. at [facility]   • SHOULDER ARTHROSCOPY W/ BICIPITAL TENODESIS REPAIR  4/2/2013    Performed by Angelique Romero M.D. at [facility]   • STENT PLACEMENT  2008    x3   • DENTAL EXTRACTION(S)  1997    wisdom teeth   • KNEE ARTHROSCOPY  1983    left    • PATELLA ORIF  1980    bilateral   • ZZZ CARDIAC CATH       Family History   Problem Relation Age of Onset   • Adopted: Yes   • Heart Attack Neg Hx    • Heart Disease Neg Hx      Social History     Social History   • Marital status:      Spouse name: N/A   • Number of children: 1   • Years of education: N/A     Occupational History   • Contractor Other     Social History Main Topics   • Smoking status: Never Smoker   • Smokeless tobacco: Never Used   • Alcohol use 3.0 oz/week     6 Cans of beer per week      Comment: 2 per week   • Drug use: No      Comment: Former Marijuana usage   • Sexual activity: Yes     Partners: Female     Other Topics Concern   • Not on file     Social History Narrative   • No narrative on file     Allergies   Allergen Reactions   • Metoprolol      Outpatient Encounter Prescriptions as of 7/12/2019   Medication Sig Dispense Refill   • rosuvastatin (CRESTOR) 20 MG Tab Take 1 Tab by mouth every bedtime. 90 Tab 3   • sildenafil citrate (VIAGRA) 25 MG Tab Take 1 Tab by mouth as needed for Erectile Dysfunction. 30 Tab 3   • pioglitazone (ACTOS) 30 MG Tab Take 1 Tab by mouth every day. 30 Tab 11   • Dapagliflozin-Metformin HCl ER 5-1000 MG TABLET SR 24 HR Take 1 tablet by mouth 2 Times a Day. 60 Tab 11   • Dulaglutide (TRULICITY) 1.5 MG/0.5ML Solution Pen-injector Inject 1 Each as instructed every 7 days. On Mon 4 PEN 11   • aspirin EC (ECOTRIN) 81 MG Tablet Delayed Response Take 81 mg by mouth every day.     • vitamin D (CHOLECALCIFEROL) 1000 UNIT TABS Take 1,000 Units by mouth 2 Times a Day.     • [DISCONTINUED] sildenafil citrate (VIAGRA) 25 MG Tab Take 1 Tab by mouth as needed for Erectile Dysfunction. 30 Tab 3   • [DISCONTINUED] rosuvastatin (CRESTOR) 20 MG Tab TAKE 1 TABLET BY MOUTH EVERY DAY AT BEDTIME 90 Tab 1     No facility-administered encounter medications on file as of 7/12/2019.      Review of Systems   Constitutional: Negative for malaise/fatigue and weight loss.   HENT:  "Negative for congestion.    Eyes: Negative for blurred vision, pain and discharge.   Respiratory: Negative for cough, shortness of breath and wheezing.    Cardiovascular: Negative for chest pain, palpitations and leg swelling.   Gastrointestinal: Negative for abdominal pain, nausea and vomiting.   Genitourinary: Negative for dysuria and urgency.   Musculoskeletal: Negative for joint pain and myalgias.   Skin: Negative for itching and rash.   Neurological: Negative for dizziness and loss of consciousness.   Endo/Heme/Allergies: Negative for environmental allergies. Does not bruise/bleed easily.   Psychiatric/Behavioral: Negative for depression. The patient is not nervous/anxious.    All other systems reviewed and are negative.       Objective:   /80 (BP Location: Left arm, Patient Position: Sitting)   Pulse 84   Ht 1.803 m (5' 11\")   Wt 87.1 kg (192 lb)   SpO2 98%   BMI 26.78 kg/m²     Physical Exam   Constitutional: He is oriented to person, place, and time. He appears well-developed and well-nourished.   HENT:   Head: Normocephalic and atraumatic.   Eyes: Pupils are equal, round, and reactive to light. EOM are normal. No scleral icterus.   Neck: No JVD present. No thyromegaly present.   Cardiovascular: Normal rate, regular rhythm and intact distal pulses.    No murmur heard.  Pulmonary/Chest: Effort normal and breath sounds normal.   Abdominal: Bowel sounds are normal. He exhibits no distension.   Musculoskeletal: He exhibits no edema or tenderness.   Lymphadenopathy:     He has no cervical adenopathy.   Neurological: He is alert and oriented to person, place, and time.   Skin: Skin is warm and dry. No rash noted.   Psychiatric: He has a normal mood and affect. His behavior is normal.       Assessment:     1. Coronary artery disease involving native coronary artery of native heart without angina pectoris  NM-CARDIAC STRESS TEST    EC-ECHOCARDIOGRAM COMPLETE W/O CONT    rosuvastatin (CRESTOR) 20 MG Tab "   2. Dyslipidemia associated with type 2 diabetes mellitus (HCC)  rosuvastatin (CRESTOR) 20 MG Tab   3. S/P CABG (coronary artery bypass graft)         Medical Decision Making:  Today's Assessment / Status / Plan:     Coronary disease  I reviewed his anatomy with him discussed his CABG  Ordered nuclear perfusion imaging study and echocardiogram per guidelines it has been more than 3 years after his surgery and he is an uncontrolled diabetic  Talked about prognosis looked at his older echo  Historical mild ischemic cardia myopathy stable    Labs reviewed as well as hemoglobin A1c    Refilled Viagra generic discussed side effects particularly with heart disease    RTC one year sooner if testing warrants

## 2019-08-08 ENCOUNTER — TELEPHONE (OUTPATIENT)
Dept: ENDOCRINOLOGY | Facility: MEDICAL CENTER | Age: 59
End: 2019-08-08

## 2019-08-08 NOTE — TELEPHONE ENCOUNTER
DOCUMENTATION OF PAR STATUS:    1. Name of Medication & Dose: Trulicity 1.5mg     2. Name of Prescription Coverage Company & phone #: [employer] Ph: [phone]    3. Date Prior Auth Submitted: 8/8/19    4. What information was given to obtain insurance decision? Chart notes and labs    5. Prior Auth Status? Pending    6. Action Taken: Pharmacy Notified: yes      (PA on Aly's desk for a signature)

## 2019-09-10 ENCOUNTER — OFFICE VISIT (OUTPATIENT)
Dept: ENDOCRINOLOGY | Facility: MEDICAL CENTER | Age: 59
End: 2019-09-10
Payer: COMMERCIAL

## 2019-09-10 VITALS
HEIGHT: 71 IN | SYSTOLIC BLOOD PRESSURE: 124 MMHG | WEIGHT: 190 LBS | OXYGEN SATURATION: 96 % | DIASTOLIC BLOOD PRESSURE: 66 MMHG | HEART RATE: 84 BPM | BODY MASS INDEX: 26.6 KG/M2

## 2019-09-10 DIAGNOSIS — E78.5 DYSLIPIDEMIA ASSOCIATED WITH TYPE 2 DIABETES MELLITUS (HCC): ICD-10-CM

## 2019-09-10 DIAGNOSIS — E11.65 UNCONTROLLED TYPE 2 DIABETES MELLITUS WITH HYPERGLYCEMIA (HCC): ICD-10-CM

## 2019-09-10 DIAGNOSIS — E11.9 TYPE 2 DIABETES MELLITUS WITHOUT COMPLICATION, WITHOUT LONG-TERM CURRENT USE OF INSULIN (HCC): ICD-10-CM

## 2019-09-10 DIAGNOSIS — E11.69 DYSLIPIDEMIA ASSOCIATED WITH TYPE 2 DIABETES MELLITUS (HCC): ICD-10-CM

## 2019-09-10 LAB
HBA1C MFR BLD: 6.6 % (ref 0–5.6)
INT CON NEG: NEGATIVE
INT CON POS: POSITIVE

## 2019-09-10 PROCEDURE — 99213 OFFICE O/P EST LOW 20 MIN: CPT | Performed by: PHYSICIAN ASSISTANT

## 2019-09-10 PROCEDURE — 83036 HEMOGLOBIN GLYCOSYLATED A1C: CPT | Performed by: PHYSICIAN ASSISTANT

## 2019-09-10 RX ORDER — DAPAGLIFLOZIN AND METFORMIN HYDROCHLORIDE 5; 1000 MG/1; MG/1
1 TABLET, FILM COATED, EXTENDED RELEASE ORAL 2 TIMES DAILY
Qty: 60 TAB | Refills: 11 | Status: SHIPPED | OUTPATIENT
Start: 2019-09-10 | End: 2020-02-10 | Stop reason: SDUPTHER

## 2019-09-10 RX ORDER — PIOGLITAZONEHYDROCHLORIDE 30 MG/1
30 TABLET ORAL DAILY
Qty: 30 TAB | Refills: 11 | Status: SHIPPED | OUTPATIENT
Start: 2019-09-10 | End: 2020-11-17 | Stop reason: SDUPTHER

## 2019-09-10 NOTE — PROGRESS NOTES
Return to office Patient Consult Note  Referred by: Jen Ortiz P.A.-C.    Reason for consult: Diabetes Management Type 2    HPI:  Hu Santacruz is a 59 y.o. old patient who is seeing us today for diabetes care.  This is a pleasant patient with diabetes and I appreciate the opportunity to participate in the care of this patient.    Labs of 9/10/2019 HbA1c is 6.6  Labs of 4/2/2019 HbA1c is 7.4  12/11/18 hbA1c is 7.3  6/11/18 HbA1c is 6.6  8/8/17 HbA1c is 7.1 in the office  2/2017 HbA1c was 5.7  12/2015 HbA1c was 6.8       BG Diary:9/10/2019  In the AM:  No log      1. Dyslipidemia associated with type 2 diabetes mellitus (HCC)        Now on:  1.  Xigduo 5/1000 one in the AM one in the PM  2.  Trulicity 1.5 once a week  3.  Start on Actos 30mg one a day (started on 6/6/19)     Was having lows with Amaryl  Now all lows have stopped        ROS:   Constitutional: No night sweats.  Eyes:  No visual changes.  Cardiac: No chest pain, No palpitations or racing heart rate.  Resp: No shortness of breath, No cough,   Gi: No Diarrhea    All other systems were reviewed and were/are negative.     Past Medical History:  Patient Active Problem List    Diagnosis Date Noted   • S/P CABG (coronary artery bypass graft) 12/20/2016     Priority: Medium   • CAD (coronary artery disease), native coronary artery 04/16/2012     Priority: Medium   • Dyslipidemia associated with type 2 diabetes mellitus (HCC) 04/16/2012     Priority: Medium   • Vasculogenic erectile dysfunction 02/24/2014     Priority: Low   • Calcifying tendinitis of shoulder 04/02/2013     Priority: Low   • Bicipital tenosynovitis 04/02/2013     Priority: Low   • Diabetes mellitus type II, uncontrolled (HCC) 09/10/2019       Past Surgical History:  Past Surgical History:   Procedure Laterality Date   • MULTIPLE CORONARY ARTERY BYPASS ENDO VEIN HARVEST  12/6/2016    Procedure: MULTIPLE CORONARY ARTERY BYPASS ENDO VEIN HARVEST X3 WITH LAITH;  Surgeon: Juan Pablo Schmitz M.D.;   Location: SURGERY [facility];  Service:    • SHOULDER ARTHROSCOPY W/ ROTATOR CUFF REPAIR  4/2/2013    Performed by Angelique Romero M.D. at SURGERY [facility]   • SHOULDER DECOMPRESSION ARTHROSCOPIC  4/2/2013    Performed by Angelique Romero M.D. at [facility]   • JOINT INJECTION DIAGNOSTIC  4/2/2013    Performed by Angelique Romero M.D. at [facility]   • CLAVICLE DISTAL EXCISION  4/2/2013    Performed by Angelique Romero M.D. at [facility]   • SHOULDER ARTHROSCOPY W/ BICIPITAL TENODESIS REPAIR  4/2/2013    Performed by Angelique Romero M.D. at [facility]   • STENT PLACEMENT  2008    x3   • DENTAL EXTRACTION(S)  1997    wisdom teeth   • KNEE ARTHROSCOPY  1983    left   • PATELLA ORIF  1980    bilateral   • ZZZ CARDIAC CATH         Allergies:  Metoprolol    Social History:  Social History     Socioeconomic History   • Marital status:      Spouse name: Not on file   • Number of children: 1   • Years of education: Not on file   • Highest education level: Not on file   Occupational History   • Occupation: Contractor     Employer: OTHER   Social Needs   • Financial resource strain: Not on file   • Food insecurity:     Worry: Not on file     Inability: Not on file   • Transportation needs:     Medical: Not on file     Non-medical: Not on file   Tobacco Use   • Smoking status: Never Smoker   • Smokeless tobacco: Never Used   Substance and Sexual Activity   • Alcohol use: Yes     Alcohol/week: 3.0 oz     Types: 6 Cans of beer per week     Comment: 2 per week   • Drug use: No     Types: Marijuana     Comment: Former Marijuana usage   • Sexual activity: Yes     Partners: Female   Lifestyle   • Physical activity:     Days per week: Not on file     Minutes per session: Not on file   • Stress: Not on file   Relationships   • Social connections:     Talks on phone: Not on file     Gets together: Not on file     Attends Alevism service: Not on  "file     Active member of club or organization: Not on file     Attends meetings of clubs or organizations: Not on file     Relationship status: Not on file   • Intimate partner violence:     Fear of current or ex partner: Not on file     Emotionally abused: Not on file     Physically abused: Not on file     Forced sexual activity: Not on file   Other Topics Concern   • Not on file   Social History Narrative   • Not on file       Family History:  Family History   Adopted: Yes   Problem Relation Age of Onset   • Heart Attack Neg Hx    • Heart Disease Neg Hx        Medications:    Current Outpatient Medications:   •  pioglitazone (ACTOS) 30 MG Tab, Take 1 Tab by mouth every day., Disp: 30 Tab, Rfl: 11  •  Dulaglutide (TRULICITY) 1.5 MG/0.5ML Solution Pen-injector, Inject 1 Each as instructed every 7 days. On Mon, Disp: 4 PEN, Rfl: 11  •  Dapagliflozin-metFORMIN HCl ER 5-1000 MG TABLET SR 24 HR, Take 1 tablet by mouth 2 Times a Day., Disp: 60 Tab, Rfl: 11  •  sildenafil citrate (VIAGRA) 25 MG Tab, Take 1 Tab by mouth as needed for Erectile Dysfunction., Disp: 30 Tab, Rfl: 3  •  rosuvastatin (CRESTOR) 20 MG Tab, Take 1 Tab by mouth every bedtime., Disp: 90 Tab, Rfl: 3  •  aspirin EC (ECOTRIN) 81 MG Tablet Delayed Response, Take 81 mg by mouth every day., Disp: , Rfl:   •  vitamin D (CHOLECALCIFEROL) 1000 UNIT TABS, Take 1,000 Units by mouth 2 Times a Day., Disp: , Rfl:         Physical Examination:   Vital signs: /66 (BP Location: Left arm, Patient Position: Sitting)   Pulse 84   Ht 1.803 m (5' 11\")   Wt 86.2 kg (190 lb)   SpO2 96%   BMI 26.50 kg/m²   General: No distress, cooperative, well dressed and well nourished.   Eyes: No scleral icterus or discharge, No hyposphagma  ENMT: Normal on external inspection of nose, lips, No nasal drainage   Neck: No abnormal masses on inspection  Resp: Normal effort, Bilateral clear to auscultation, No wheezing, No rales  CVS: Regular rate and rhythm, S1 S2 normal, No " murmur. No gallop  Extremities: No edema bilateral extremities  Neuro: Alert and oriented  Skin: No rash, No Ulcers  Psych: Normal mood and affect      Assessment and Plan:    1. Dyslipidemia associated with type 2 diabetes mellitus (HCC)    Now on:  1.  Xigduo 5/1000 one in the AM one in the PM  2.  Trulicity 1.5 once a week  3.  Start on Actos 30mg one a day (started on 6/6/19)    Return in about 6 months (around 3/10/2020).      Thank you kindly for allowing me to participate in the diabetes care plan for this patient.    Aly Turcios PA-C, BC-ADM  Board Certified - Advanced Diabetes Management  09/10/19    CC:   Jen Ortiz P.A.-C.

## 2019-09-24 NOTE — TELEPHONE ENCOUNTER
DOCUMENTATION OF PAR STATUS:    1. Name of Medication & Dose: Trulicity 1.5mg     2. Name of Prescription Coverage Company & phone #: [employer] Ph: [phone]    3. Date Prior Auth Submitted: 9/24/19    4. What information was given to obtain insurance decision? Chart notes and labs    5. Prior Auth Status? Pending    6. Action Taken: Pharmacy Notified: yes    I called [employer] to check on the status of the PA for Trulicity that was initiated on 8/13/19. I spoke with Jeannie who said it was canceled and requested for me to resend the PA with chart notes and labs.

## 2019-10-15 ENCOUNTER — OFFICE VISIT (OUTPATIENT)
Dept: MEDICAL GROUP | Age: 59
End: 2019-10-15
Payer: COMMERCIAL

## 2019-10-15 VITALS
DIASTOLIC BLOOD PRESSURE: 62 MMHG | BODY MASS INDEX: 26.88 KG/M2 | HEART RATE: 77 BPM | OXYGEN SATURATION: 97 % | WEIGHT: 192 LBS | TEMPERATURE: 98.1 F | HEIGHT: 71 IN | SYSTOLIC BLOOD PRESSURE: 110 MMHG

## 2019-10-15 DIAGNOSIS — Z23 NEED FOR VACCINATION: ICD-10-CM

## 2019-10-15 DIAGNOSIS — Z11.59 NEED FOR HEPATITIS C SCREENING TEST: ICD-10-CM

## 2019-10-15 DIAGNOSIS — H61.22 LEFT EAR IMPACTED CERUMEN: ICD-10-CM

## 2019-10-15 DIAGNOSIS — E11.69 DYSLIPIDEMIA ASSOCIATED WITH TYPE 2 DIABETES MELLITUS (HCC): ICD-10-CM

## 2019-10-15 DIAGNOSIS — I25.10 CORONARY ARTERY DISEASE INVOLVING NATIVE CORONARY ARTERY OF NATIVE HEART WITHOUT ANGINA PECTORIS: ICD-10-CM

## 2019-10-15 DIAGNOSIS — E78.5 DYSLIPIDEMIA ASSOCIATED WITH TYPE 2 DIABETES MELLITUS (HCC): ICD-10-CM

## 2019-10-15 PROCEDURE — 90686 IIV4 VACC NO PRSV 0.5 ML IM: CPT | Performed by: PHYSICIAN ASSISTANT

## 2019-10-15 PROCEDURE — 69210 REMOVE IMPACTED EAR WAX UNI: CPT | Performed by: PHYSICIAN ASSISTANT

## 2019-10-15 PROCEDURE — 90471 IMMUNIZATION ADMIN: CPT | Performed by: PHYSICIAN ASSISTANT

## 2019-10-15 PROCEDURE — 99214 OFFICE O/P EST MOD 30 MIN: CPT | Mod: 25 | Performed by: PHYSICIAN ASSISTANT

## 2019-10-15 SDOH — HEALTH STABILITY: MENTAL HEALTH: HOW MANY STANDARD DRINKS CONTAINING ALCOHOL DO YOU HAVE ON A TYPICAL DAY?: 1 OR 2

## 2019-10-15 SDOH — HEALTH STABILITY: MENTAL HEALTH: HOW OFTEN DO YOU HAVE A DRINK CONTAINING ALCOHOL?: 2-4 TIMES A MONTH

## 2019-10-15 SDOH — HEALTH STABILITY: MENTAL HEALTH: HOW OFTEN DO YOU HAVE 6 OR MORE DRINKS ON ONE OCCASION?: NEVER
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
## 2019-12-06 NOTE — PROGRESS NOTES
Review of Systems   Constitution: Negative for chills, decreased appetite, diaphoresis and fever.   Cardiovascular: Positive for dyspnea on exertion. Negative for chest pain, claudication, cyanosis, irregular heartbeat, leg swelling, near-syncope, orthopnea, palpitations, paroxysmal nocturnal dyspnea and syncope.   Respiratory: Negative for cough, hemoptysis, shortness of breath and wheezing.    Gastrointestinal: Negative for bloating, abdominal pain, constipation, diarrhea, melena, nausea and vomiting.   Neurological: Negative for dizziness and weakness.     Objective:     Vital Signs (Most Recent):  Temp: 98.2 °F (36.8 °C) (12/06/19 0730)  Pulse: 79 (12/06/19 1015)  Resp: (!) 22 (12/06/19 1015)  BP: (!) 160/71 (12/06/19 1000)  SpO2: 96 % (12/06/19 1015) Vital Signs (24h Range):  Temp:  [98.2 °F (36.8 °C)-99 °F (37.2 °C)] 98.2 °F (36.8 °C)  Pulse:  [] 79  Resp:  [17-37] 22  SpO2:  [90 %-100 %] 96 %  BP: (103-185)/(51-83) 160/71     Weight: 110.3 kg (243 lb 2.7 oz)  Body mass index is 35.39 kg/m².     SpO2: 96 %  O2 Device (Oxygen Therapy): BiPAP      Intake/Output Summary (Last 24 hours) at 12/6/2019 1056  Last data filed at 12/6/2019 0600  Gross per 24 hour   Intake 1259.58 ml   Output 972 ml   Net 287.58 ml       Lines/Drains/Airways     Central Venous Catheter Line                 Trialysis (Dialysis) Catheter 12/03/19 1659 right internal jugular 2 days          Drain                 Urethral Catheter 12/03/19 0445 16 Fr. 3 days                Physical Exam   Constitutional: He is oriented to person, place, and time. He appears well-developed and well-nourished. No distress.   Cardiovascular: Normal rate and regular rhythm. Exam reveals no gallop.   No murmur heard.  Pulmonary/Chest: Effort normal and breath sounds normal. Tachypnea noted. No respiratory distress. He has no wheezes.   Abdominal: Soft. Bowel sounds are normal. He exhibits no distension. There is no tenderness.   Neurological: He is  "The patient watched \"The Pritikin Solution\" video.     Cardiac Rehab Individual Treatment Plan Assessment: Initial 2017 Session #     1   MRN: [id number]   Allergies: Review of patient's allergies indicates no known allergies.   Patient Name: Hu Santacruz : 1960 Risk Stratification: high    Diagnoses:   1. S/P CABG (coronary artery bypass graft)     Age: 56 y.o. Physician: Jen Ortiz PA-C    Date of Event: 16 Specialist:ADALI Olmstead   Risk Factors:  Hypertension, Hyperlipidemia, Diabetes, Stress, Age, Male > 45   Exercise Nutrition Education Psychosocial   Stages of change Stages of change Stages of change Stages of change   Preparation Preparation Preparation Preparation   Fitness Test Lipids Learning Barriers Outcome Survey Tools   DIST: 7964  Available: Yes Learning Barriers: Vision FP QOL Overall Score: 19.21   Max HR: 120 Date: 17 Family Support PHQ-9: 16 (This is moderate depression)   RPE: 10 Total: 119 mg/dL Yes Nutrition Screen: 32 %   SPO2: 98 Tri mg/dL Lives: Spouse Intervention   MET: 3.7 HDL: 35 mg/dL Tobacco Use Behavioral Health Consult: Yes   EF= 45 LDL: 66 mg/dL History   Smoking status   • Never Smoker    Smokeless tobacco   • Never Used      Physician Referral: Yes (note to MD regarding depression)   Ambulatory Status Diabetes Smoking Intervention Identifies Stressors: Yes   Fall Risk Assessed: Yes Diabetes: Yes Smoking Cessation Referral: N/A Drug Intervention: Yes (Xanax)   Exercise Ambulatory Status Assist Devices: None HbA1C: 6.3 % Date: 16 Ind. Education / Counseling: N/A Education   Exercise Prescription Monitors BS at Home: Yes (Will check BS here pre and post exercise.) Tobacco Adjunct: N/A Psychosocial Education: Coping Techniques, Positive Support System, S/S Depression   Mode: Treadmill, Elliptical, UBE, Airdyne   Frequency: 1-2 x day Random BS: 161 Education Intervention Target Goal   Frequency:  3 days/week Weight Management Healthy Heart " "Education: Class Schedule Given, Medications Reviewed, Patient Education Binder Provided, Risk Factors Discussed, Videos Viewed per Shamir (workshops and 1:1 scheduled) Assess presence or absence of depression using a valid screening: Yes   Duration:  15-25 min Weight: 86.864 kg (191 lb 8 oz) Target Goal Use Stress Management: Yes   Intensity:  11-13 RPE Height: 180.3 cm (5' 10.98\") Complete Tobacco Cessation: Complete Tobacco Cessation: N/A Adverse Events: Yes   METS:  3.0-5.0 Body mass index is 26.72 kg/(m^2). Educate / Review and have understanding of cardiac disease prevention: Educate / Review and have understanding of cardiac disease prevention: Yes Unexpected Events: Yes   Progression:  ^ increments of 1-5 to THR/RPE as tolerated Goal weight: lose 20 #s Medication Compliance: Yes    THR: THR: 20-30 BPM ^Resting HR History   Alcohol Use   • 3.0 oz/week   • 6 Cans of beer per week     Comment: 2 per week         Angina with Exercise?  Angina with Exercise: No      Resistance Training?  Resistance Training: Yes      Hypertension      Diagnosed with HTN: No Intervention      Resting BP: 127/79 mmHg Dietician Consult/Class: Yes        Nurse/Patient Discussion: Yes     Intervention Diabetes Ed Referral: Yes     Home Exercise:  Yes Discuss Maintenance /Wt Loss: Yes     Mode: Walk Attended Cooking School: Pending (scheduled)     Duration: 15 min + 1-2 up to 30 + minutes Dietary Goal: 24% fat; low sodium; Shamir recommended diet     Frequency: 7 days active Education     Education Nutrition Education: S&S Hypo/Hyper glycemia, Relate Diabetes to CAD, Healthy Eating, Sodium Reduction     Equipment Orientation, Exercise Safety, S/S to Report, RPE Scale, Warm Up / Cool Down, Physically Active Target Goal     Target Goal LDL-C < 100 if trig. > 200:  No       Start Individual Exercise Rx Yes LDL-C < 70 for high risk patient:  Yes       BP < 140/90 or < 130/80 if DM or CKD Yes Non HDL-C Should be < 130:  Yes     " alert and oriented to person, place, and time.   Skin: Skin is warm and dry.       Significant Labs:     Recent Labs   Lab 12/06/19  0844   WBC 10.80   RBC 2.52*   HGB 7.5*   HCT 22.4*   *   MCV 89   MCH 29.8   MCHC 33.5     Recent Labs   Lab 12/06/19  0416   *   K 5.0      CO2 23   BUN 69*   CREATININE 5.4*   MG 2.3       Significant Imaging: Echocardiogram:   Transthoracic echo (TTE) complete (Cupid Only):   Results for orders placed or performed during the hospital encounter of 12/02/19   Echo Color Flow Doppler? Yes   Result Value Ref Range    BSA 2.3 m2    TDI SEPTAL 0.06 m/s    LV LATERAL E/E' RATIO 9.00 m/s    LV SEPTAL E/E' RATIO 10.50 m/s    TDI LATERAL 0.07 m/s    PV PEAK VELOCITY 1.81 cm/s    LVIDD 4.80 3.5 - 6.0 cm    IVS 1.10 (A) 0.6 - 1.1 cm    PW 1.10 (A) 0.6 - 1.1 cm    Ao root annulus 3.40 cm    LVIDS 2.56 2.1 - 4.0 cm    FS 47 28 - 44 %    LV mass 193.96 g    LA size 3.68 cm    TAPSE 1.44 cm    Left Ventricle Relative Wall Thickness 0.46 cm    AV mean gradient 6 mmHg    AV valve area 4.31 cm2    AV Velocity Ratio 0.78     AV index (prosthetic) 1.06     E/A ratio 0.62     Mean e' 0.07 m/s    E wave decelartion time 338.80 msec    LVOT diameter 2.27 cm    LVOT area 4.0 cm2    LVOT peak simone 1.16 m/s    LVOT peak VTI 26.25 cm    Ao peak simone 1.48 m/s    Ao VTI 24.66 cm    LVOT stroke volume 106.18 cm3    AV peak gradient 9 mmHg    E/E' ratio 9.69 m/s    MV Peak E Simone 0.63 m/s    MV Peak A Simone 1.02 m/s    LV Systolic Volume 23.57 mL    LV Systolic Volume Index 10.6 mL/m2    LV Diastolic Volume 68.84 mL    LV Diastolic Volume Index 30.84 mL/m2    LV Mass Index 87 g/m2    Right Atrial Pressure (from IVC) 3 mmHg    Narrative    · Normal left ventricular systolic function. The estimated ejection   fraction is 55%  · Concentric left ventricular remodeling.  · No wall motion abnormalities.  · Normal right ventricular systolic function.  · Normal central venous pressure (3 mm Hg).           Aerobic activity 30 + min / day 5 days / wk Yes HbA1C < 7%: Yes         BMI < 25: Yes       Notes: Hx anxiety, Shoulder arthroscopy, Rotator cuff repair; Clavicle incision, patella orif, muscle disorder, Biceptal tenodesis repair, Knee arthroscopy;   Initial Assessment:  Heart Sounds: S1S2 WNL       Lung Sounds: Clear bilaterally       Edema: none       Right Peripheral Pulses:  Carotid: 3+  Radial: 2+  Dorsalis Pedis: 2+  Posterior Tibialis: 2+   Left Peripheral Pulses:  Carotid: 3+  Radial: 2+  Dorsalis Pedis: 2+  Posterior Tibialis: 2+    Incision: healing      Color: pink       Frame Size: Large        Cognitive Assessment: no problem      Fall Assessment:    Gait: WNL  Balance: weak  Upper Body: WNL  Lower Body: WNL   Recent Falls: yes; wake barrding and snow boarding  Current Medications and Vaccinations: Reviewed  Patient Stated Goals: Confidence in physical activiity; education and support in Healthy Heart program  Other: 36 monitor sessions;

## 2020-01-25 DIAGNOSIS — N52.9 ERECTILE DYSFUNCTION, UNSPECIFIED ERECTILE DYSFUNCTION TYPE: Primary | ICD-10-CM

## 2020-01-27 RX ORDER — SILDENAFIL 25 MG/1
TABLET, FILM COATED ORAL
Qty: 30 TAB | Refills: 0 | Status: SHIPPED | OUTPATIENT
Start: 2020-01-27 | End: 2020-03-24 | Stop reason: SDUPTHER

## 2020-02-03 ENCOUNTER — PATIENT MESSAGE (OUTPATIENT)
Dept: ENDOCRINOLOGY | Facility: MEDICAL CENTER | Age: 60
End: 2020-02-03

## 2020-02-07 NOTE — PATIENT COMMUNICATION
FINAL PRIOR AUTHORIZATION STATUS:    1.  Name of Medication & Dose: Xogduo 5-1000     2. Prior Auth Status: Approved through 02/07/2021     3. Action Taken: Pharmacy Notified: yes Patient Notified: yes

## 2020-02-10 DIAGNOSIS — E11.65 UNCONTROLLED TYPE 2 DIABETES MELLITUS WITH HYPERGLYCEMIA (HCC): ICD-10-CM

## 2020-02-10 RX ORDER — DAPAGLIFLOZIN AND METFORMIN HYDROCHLORIDE 5; 1000 MG/1; MG/1
1 TABLET, FILM COATED, EXTENDED RELEASE ORAL 2 TIMES DAILY
Qty: 60 TAB | Refills: 11 | Status: SHIPPED | OUTPATIENT
Start: 2020-02-10 | End: 2020-10-01

## 2020-02-11 DIAGNOSIS — I25.10 CORONARY ARTERY DISEASE INVOLVING NATIVE CORONARY ARTERY OF NATIVE HEART WITHOUT ANGINA PECTORIS: Chronic | ICD-10-CM

## 2020-02-11 DIAGNOSIS — E11.69 DYSLIPIDEMIA ASSOCIATED WITH TYPE 2 DIABETES MELLITUS (HCC): ICD-10-CM

## 2020-02-11 DIAGNOSIS — E78.5 DYSLIPIDEMIA ASSOCIATED WITH TYPE 2 DIABETES MELLITUS (HCC): ICD-10-CM

## 2020-02-12 RX ORDER — ROSUVASTATIN CALCIUM 20 MG/1
TABLET, COATED ORAL
Qty: 90 TAB | Refills: 4 | Status: SHIPPED | OUTPATIENT
Start: 2020-02-12 | End: 2021-06-28

## 2020-03-14 LAB
ALBUMIN SERPL-MCNC: 4.8 G/DL (ref 3.8–4.9)
ALBUMIN/CREAT UR: <5 MG/G CREAT (ref 0–29)
ALBUMIN/GLOB SERPL: 2.8 {RATIO} (ref 1.2–2.2)
ALP SERPL-CCNC: 64 IU/L (ref 39–117)
ALT SERPL-CCNC: 23 IU/L (ref 0–44)
AST SERPL-CCNC: 15 IU/L (ref 0–40)
BASOPHILS # BLD AUTO: 0.1 X10E3/UL (ref 0–0.2)
BASOPHILS NFR BLD AUTO: 1 %
BILIRUB SERPL-MCNC: 0.4 MG/DL (ref 0–1.2)
BUN SERPL-MCNC: 16 MG/DL (ref 6–24)
BUN/CREAT SERPL: 15 (ref 9–20)
CALCIUM SERPL-MCNC: 9.8 MG/DL (ref 8.7–10.2)
CHLORIDE SERPL-SCNC: 104 MMOL/L (ref 96–106)
CHOLEST SERPL-MCNC: 159 MG/DL (ref 100–199)
CO2 SERPL-SCNC: 24 MMOL/L (ref 20–29)
CREAT SERPL-MCNC: 1.06 MG/DL (ref 0.76–1.27)
CREAT UR-MCNC: 65.9 MG/DL
EOSINOPHIL # BLD AUTO: 0.1 X10E3/UL (ref 0–0.4)
EOSINOPHIL NFR BLD AUTO: 3 %
ERYTHROCYTE [DISTWIDTH] IN BLOOD BY AUTOMATED COUNT: 12.6 % (ref 11.6–15.4)
GLOBULIN SER CALC-MCNC: 1.7 G/DL (ref 1.5–4.5)
GLUCOSE SERPL-MCNC: 132 MG/DL (ref 65–99)
HBA1C MFR BLD: 6.4 % (ref 4.8–5.6)
HCT VFR BLD AUTO: 48.5 % (ref 37.5–51)
HCV AB S/CO SERPL IA: 0.1 S/CO RATIO (ref 0–0.9)
HDLC SERPL-MCNC: 53 MG/DL
HGB BLD-MCNC: 17.1 G/DL (ref 13–17.7)
IMM GRANULOCYTES # BLD AUTO: 0 X10E3/UL (ref 0–0.1)
IMM GRANULOCYTES NFR BLD AUTO: 0 %
IMMATURE CELLS  115398: NORMAL
LABORATORY COMMENT REPORT: NORMAL
LDLC SERPL CALC-MCNC: 87 MG/DL (ref 0–99)
LYMPHOCYTES # BLD AUTO: 1.6 X10E3/UL (ref 0.7–3.1)
LYMPHOCYTES NFR BLD AUTO: 30 %
MCH RBC QN AUTO: 32.4 PG (ref 26.6–33)
MCHC RBC AUTO-ENTMCNC: 35.3 G/DL (ref 31.5–35.7)
MCV RBC AUTO: 92 FL (ref 79–97)
MICROALBUMIN UR-MCNC: <3 UG/ML
MONOCYTES # BLD AUTO: 0.5 X10E3/UL (ref 0.1–0.9)
MONOCYTES NFR BLD AUTO: 9 %
MORPHOLOGY BLD-IMP: NORMAL
NEUTROPHILS # BLD AUTO: 3.1 X10E3/UL (ref 1.4–7)
NEUTROPHILS NFR BLD AUTO: 57 %
NRBC BLD AUTO-RTO: NORMAL %
PLATELET # BLD AUTO: 268 X10E3/UL (ref 150–450)
POTASSIUM SERPL-SCNC: 5.1 MMOL/L (ref 3.5–5.2)
PROT SERPL-MCNC: 6.5 G/DL (ref 6–8.5)
RBC # BLD AUTO: 5.27 X10E6/UL (ref 4.14–5.8)
SODIUM SERPL-SCNC: 142 MMOL/L (ref 134–144)
TRIGL SERPL-MCNC: 97 MG/DL (ref 0–149)
VLDLC SERPL CALC-MCNC: 19 MG/DL (ref 5–40)
WBC # BLD AUTO: 5.5 X10E3/UL (ref 3.4–10.8)

## 2020-03-23 ENCOUNTER — PATIENT MESSAGE (OUTPATIENT)
Dept: MEDICAL GROUP | Age: 60
End: 2020-03-23

## 2020-03-23 ENCOUNTER — TELEPHONE (OUTPATIENT)
Dept: CARDIOLOGY | Facility: MEDICAL CENTER | Age: 60
End: 2020-03-23

## 2020-03-23 DIAGNOSIS — N52.9 ERECTILE DYSFUNCTION, UNSPECIFIED ERECTILE DYSFUNCTION TYPE: ICD-10-CM

## 2020-03-23 DIAGNOSIS — F41.8 SITUATIONAL ANXIETY: ICD-10-CM

## 2020-03-24 RX ORDER — SILDENAFIL 25 MG/1
TABLET, FILM COATED ORAL
Qty: 30 TAB | Refills: 0 | Status: SHIPPED | OUTPATIENT
Start: 2020-03-24 | End: 2020-05-14

## 2020-03-26 RX ORDER — ALPRAZOLAM 0.25 MG/1
0.25 TABLET ORAL NIGHTLY PRN
Qty: 20 TAB | Refills: 0 | Status: SHIPPED
Start: 2020-03-26 | End: 2022-04-26 | Stop reason: SDUPTHER

## 2020-03-26 NOTE — PATIENT COMMUNICATION
Faxed to   [id number] - [place], [place] - [address]  [address]  [place] [address]  Phone: [phone] Fax: [phone]

## 2020-04-06 ENCOUNTER — PATIENT MESSAGE (OUTPATIENT)
Dept: ENDOCRINOLOGY | Facility: MEDICAL CENTER | Age: 60
End: 2020-04-06

## 2020-04-06 ENCOUNTER — APPOINTMENT (OUTPATIENT)
Dept: RADIOLOGY | Facility: MEDICAL CENTER | Age: 60
End: 2020-04-06
Attending: INTERNAL MEDICINE
Payer: COMMERCIAL

## 2020-04-06 ENCOUNTER — APPOINTMENT (OUTPATIENT)
Dept: CARDIOLOGY | Facility: MEDICAL CENTER | Age: 60
End: 2020-04-06
Attending: INTERNAL MEDICINE
Payer: COMMERCIAL

## 2020-04-13 ENCOUNTER — APPOINTMENT (OUTPATIENT)
Dept: CARDIOLOGY | Facility: MEDICAL CENTER | Age: 60
End: 2020-04-13
Payer: COMMERCIAL

## 2020-04-14 ENCOUNTER — APPOINTMENT (OUTPATIENT)
Dept: ENDOCRINOLOGY | Facility: MEDICAL CENTER | Age: 60
End: 2020-04-14
Payer: COMMERCIAL

## 2020-05-14 DIAGNOSIS — N52.9 ERECTILE DYSFUNCTION, UNSPECIFIED ERECTILE DYSFUNCTION TYPE: ICD-10-CM

## 2020-05-14 RX ORDER — SILDENAFIL 25 MG/1
TABLET, FILM COATED ORAL
Qty: 30 TAB | Refills: 0 | Status: SHIPPED | OUTPATIENT
Start: 2020-05-14 | End: 2020-10-01 | Stop reason: SDUPTHER

## 2020-10-01 ENCOUNTER — OFFICE VISIT (OUTPATIENT)
Dept: CARDIOLOGY | Facility: MEDICAL CENTER | Age: 60
End: 2020-10-01
Payer: COMMERCIAL

## 2020-10-01 VITALS
SYSTOLIC BLOOD PRESSURE: 116 MMHG | HEIGHT: 71 IN | OXYGEN SATURATION: 99 % | HEART RATE: 74 BPM | WEIGHT: 186 LBS | DIASTOLIC BLOOD PRESSURE: 64 MMHG | BODY MASS INDEX: 26.04 KG/M2

## 2020-10-01 DIAGNOSIS — E11.9 TYPE 2 DIABETES MELLITUS WITHOUT COMPLICATION, WITHOUT LONG-TERM CURRENT USE OF INSULIN (HCC): ICD-10-CM

## 2020-10-01 DIAGNOSIS — I25.10 CORONARY ARTERY DISEASE INVOLVING NATIVE CORONARY ARTERY OF NATIVE HEART WITHOUT ANGINA PECTORIS: Chronic | ICD-10-CM

## 2020-10-01 DIAGNOSIS — N52.9 ERECTILE DYSFUNCTION, UNSPECIFIED ERECTILE DYSFUNCTION TYPE: ICD-10-CM

## 2020-10-01 DIAGNOSIS — E78.5 DYSLIPIDEMIA ASSOCIATED WITH TYPE 2 DIABETES MELLITUS (HCC): ICD-10-CM

## 2020-10-01 DIAGNOSIS — E11.69 DYSLIPIDEMIA ASSOCIATED WITH TYPE 2 DIABETES MELLITUS (HCC): ICD-10-CM

## 2020-10-01 DIAGNOSIS — Z95.1 HX OF CABG: ICD-10-CM

## 2020-10-01 PROCEDURE — 99214 OFFICE O/P EST MOD 30 MIN: CPT | Performed by: INTERNAL MEDICINE

## 2020-10-01 RX ORDER — SILDENAFIL 100 MG/1
TABLET, FILM COATED ORAL
Qty: 30 TAB | Refills: 3 | Status: SHIPPED | OUTPATIENT
Start: 2020-10-01 | End: 2021-05-06

## 2020-10-01 ASSESSMENT — ENCOUNTER SYMPTOMS
DYSPNEA ON EXERTION: 0
LOSS OF BALANCE: 0
MYALGIAS: 0
BACK PAIN: 1
BLOATING: 0
NIGHT SWEATS: 0
VOMITING: 0
NAUSEA: 0
SLEEP DISTURBANCES DUE TO BREATHING: 0
CONSTIPATION: 0
WHEEZING: 0
DECREASED APPETITE: 0
SORE THROAT: 0
PALPITATIONS: 0
FEVER: 0
DIARRHEA: 0
HEADACHES: 0
PARESTHESIAS: 0
EXCESSIVE DAYTIME SLEEPINESS: 0
DIZZINESS: 0
DOUBLE VISION: 0
FALLS: 0
DIAPHORESIS: 0
PND: 0
WEAKNESS: 0
SHORTNESS OF BREATH: 0
ORTHOPNEA: 0
IRREGULAR HEARTBEAT: 0
NUMBNESS: 0
SYNCOPE: 0
COUGH: 0
BLURRED VISION: 0
LIGHT-HEADEDNESS: 0
NEAR-SYNCOPE: 0

## 2020-10-01 ASSESSMENT — FIBROSIS 4 INDEX: FIB4 SCORE: 0.69

## 2020-10-01 NOTE — PROGRESS NOTES
Cardiology Follow-up Consultation Note    Date of note:    10/1/2020    Primary Care Provider: Jen Ortiz P.A.-C.    Name:             Hu Santacruz   YOB: 1960  MRN:               [id number]    Chief Complaint   Patient presents with   • Coronary Artery Disease   • Coronary Artery Bypass Graft (CABG)       HISTORY OF PRESENT ILLNESS  Mr. Hu Santacruz is a 60 y.o. male who returns to see us for follow-up of coronary artery disease s/p CABG x3 in 2016 and hyperlipidemia.    Last clinic visit: 7/12/2019 with Sri Olmstead M.D.    Interim History:  Since his last visit, patient reports doing well without any cardiovascular complaints.  He continues to stay active with his job without any concerning symptoms of chest pain or pressure.  Denies palpitations, lightheadedness, presyncope or syncopal events.    Reports well-controlled diabetes since change in medications.    Is requesting refill for sildenafil at a higher dose since he is currently taking 3 to 4 pills of the 25 mg.    Review of Systems   Constitution: Negative for decreased appetite, diaphoresis, fever, malaise/fatigue and night sweats.   HENT: Negative for congestion and sore throat.    Eyes: Negative for blurred vision and double vision.   Cardiovascular: Negative for chest pain, cyanosis, dyspnea on exertion, irregular heartbeat, leg swelling, near-syncope, orthopnea, palpitations, paroxysmal nocturnal dyspnea and syncope.   Respiratory: Negative for cough, shortness of breath, sleep disturbances due to breathing and wheezing.    Endocrine: Negative for cold intolerance and heat intolerance.   Musculoskeletal: Positive for back pain and joint pain. Negative for falls and myalgias.   Gastrointestinal: Negative for bloating, constipation, diarrhea, nausea and vomiting.   Neurological: Negative for excessive daytime sleepiness, dizziness, headaches, light-headedness, loss of balance, numbness, paresthesias and weakness.            Past Medical History:   Diagnosis Date   • Adjustment disorder 4/16/2012   • Back pain    • CAD (coronary artery disease), native coronary artery 4/16/2012    CABG X3 (LIMA-LAD, SVG-2nd diag and R-PDA)-12/2016   • Cold 2/21/13   • DM (diabetes mellitus) ([facility]) 2008    oral agent   • Hyperlipidemia 4/16/2012   • Muscle disorder    • Old myocardial infarction 2008    cardiologist; Dr. Olmstead   • Pain     right shoulder   • Situational anxiety 10/11/2016   • Snoring          Past Surgical History:   Procedure Laterality Date   • MULTIPLE CORONARY ARTERY BYPASS ENDO VEIN HARVEST  12/6/2016    Procedure: MULTIPLE CORONARY ARTERY BYPASS ENDO VEIN HARVEST X3 WITH LAITH;  Surgeon: Juan Pablo Schmitz M.D.;  Location: SURGERY [facility];  Service:    • SHOULDER ARTHROSCOPY W/ ROTATOR CUFF REPAIR  4/2/2013    Performed by Angelique Romero M.D. at [facility]   • SHOULDER DECOMPRESSION ARTHROSCOPIC  4/2/2013    Performed by Angelique Romero M.D. at [facility]   • JOINT INJECTION DIAGNOSTIC  4/2/2013    Performed by Angelique Romero M.D. at [facility]   • CLAVICLE DISTAL EXCISION  4/2/2013    Performed by Angelique Romero M.D. at [facility]   • SHOULDER ARTHROSCOPY W/ BICIPITAL TENODESIS REPAIR  4/2/2013    Performed by Angelique Romero M.D. at [facility]   • STENT PLACEMENT  2008    x3   • DENTAL EXTRACTION(S)  1997    wisdom teeth   • KNEE ARTHROSCOPY  1983    left   • PATELLA ORIF  1980    bilateral   • ZZZ CARDIAC CATH           Current Outpatient Medications   Medication Sig Dispense Refill   • sildenafil citrate (VIAGRA) 100 MG tablet TAKE ONE TABLET BY MOUTH DAILY AS NEEDED FOR ERECTILE DYSFUNCTION *VIAGRA* 30 Tab 3   • rosuvastatin (CRESTOR) 20 MG Tab TAKE 1 TABLET BY MOUTH ONCE DAILY AT BEDTIME 90 Tab 4   • XIGDUO XR 5-1000 MG TABLET SR 24 HR TAKE 1 TABLET BY MOUTH TWICE DAILY 60 Tab 11   • pioglitazone (ACTOS) 30 MG Tab Take 1 Tab  by mouth every day. 30 Tab 11   • Dulaglutide (TRULICITY) 1.5 MG/0.5ML Solution Pen-injector Inject 1 Each as instructed every 7 days. On Mon 4 PEN 11   • aspirin EC (ECOTRIN) 81 MG Tablet Delayed Response Take 81 mg by mouth every day.     • vitamin D (CHOLECALCIFEROL) 1000 UNIT TABS Take 1,000 Units by mouth 2 Times a Day.       No current facility-administered medications for this visit.          Allergies   Allergen Reactions   • Metoprolol          Family History   Adopted: Yes   Problem Relation Age of Onset   • Heart Attack Neg Hx    • Heart Disease Neg Hx          Social History     Socioeconomic History   • Marital status:      Spouse name: Not on file   • Number of children: 1   • Years of education: Not on file   • Highest education level: Not on file   Occupational History   • Occupation: Contractor     Employer: OTHER   Social Needs   • Financial resource strain: Not on file   • Food insecurity     Worry: Not on file     Inability: Not on file   • Transportation needs     Medical: Not on file     Non-medical: Not on file   Tobacco Use   • Smoking status: Never Smoker   • Smokeless tobacco: Never Used   Substance and Sexual Activity   • Alcohol use: Yes     Alcohol/week: 3.0 oz     Types: 6 Cans of beer per week     Frequency: 2-4 times a month     Drinks per session: 1 or 2     Binge frequency: Never     Comment: 2 per week   • Drug use: No     Comment: Former Marijuana usage   • Sexual activity: Yes     Partners: Female   Lifestyle   • Physical activity     Days per week: Not on file     Minutes per session: Not on file   • Stress: Not on file   Relationships   • Social connections     Talks on phone: Not on file     Gets together: Not on file     Attends Worship service: Not on file     Active member of club or organization: Not on file     Attends meetings of clubs or organizations: Not on file     Relationship status: Not on file   • Intimate partner violence     Fear of current or ex  "partner: Not on file     Emotionally abused: Not on file     Physically abused: Not on file     Forced sexual activity: Not on file   Other Topics Concern   • Not on file   Social History Narrative   • Not on file         Physical Exam:  Ambulatory Vitals  /64 (BP Location: Left arm, Patient Position: Sitting, BP Cuff Size: Adult)   Pulse 74   Ht 1.803 m (5' 11\")   Wt 84.4 kg (186 lb)   SpO2 99%    Oxygen Therapy:  Pulse Oximetry: 99 %  BP Readings from Last 4 Encounters:   10/01/20 116/64   10/15/19 110/62   09/10/19 124/66   07/12/19 142/80       Weight/BMI: Body mass index is 25.94 kg/m².  Wt Readings from Last 4 Encounters:   10/01/20 84.4 kg (186 lb)   10/15/19 87.1 kg (192 lb)   09/10/19 86.2 kg (190 lb)   07/12/19 87.1 kg (192 lb)       GEN: Well developed, well nourished and in no acute distress.  HEART: no significant JVD, regular rate and rhythm, normal S1 and S2, no murmurs, no third heart sounds, normal cardiac palpation  LUNG: clear to auscultation bilaterally, no wheezing, no crackles, normal respiratory effort on room air  ABDOMEN: soft, non-tender, non-distended, normal bowel sounds throughout  EXTREMITIES: no peripheral edema noted  VASCULAR: no significantly elevated jugular venous pressure, no carotid bruits noted, radial pulses 2+ and equal      Lab Data Review:  Lab Results   Component Value Date/Time    CHOLSTRLTOT 159 03/13/2020 04:43 AM    CHOLSTRLTOT 161 04/02/2019 07:14 AM    LDL 87 03/13/2020 04:43 AM    LDL 84 04/02/2019 07:14 AM    HDL 53 03/13/2020 04:43 AM    HDL 45 04/02/2019 07:14 AM    TRIGLYCERIDE 97 03/13/2020 04:43 AM    TRIGLYCERIDE 158 (H) 04/02/2019 07:14 AM       Lab Results   Component Value Date/Time    SODIUM 142 03/13/2020 04:43 AM    SODIUM 139 04/02/2019 07:14 AM    POTASSIUM 5.1 03/13/2020 04:43 AM    POTASSIUM 4.4 04/02/2019 07:14 AM    CHLORIDE 104 03/13/2020 04:43 AM    CHLORIDE 102 04/02/2019 07:14 AM    CO2 24 03/13/2020 04:43 AM    CO2 28 04/02/2019 " 07:14 AM    GLUCOSE 132 (H) 03/13/2020 04:43 AM    GLUCOSE 190 (H) 04/02/2019 07:14 AM    BUN 16 03/13/2020 04:43 AM    BUN 15 04/02/2019 07:14 AM    CREATININE 1.06 03/13/2020 04:43 AM    CREATININE 0.97 04/02/2019 07:14 AM    BUNCREATRAT 15 03/13/2020 04:43 AM     Lab Results   Component Value Date/Time    ALKPHOSPHAT 64 03/13/2020 04:43 AM    ALKPHOSPHAT 59 04/02/2019 07:14 AM    ASTSGOT 15 03/13/2020 04:43 AM    ASTSGOT 19 04/02/2019 07:14 AM    ALTSGPT 23 03/13/2020 04:43 AM    ALTSGPT 28 04/02/2019 07:14 AM    TBILIRUBIN 0.4 03/13/2020 04:43 AM    TBILIRUBIN 0.6 04/02/2019 07:14 AM      Lab Results   Component Value Date/Time    WBC 5.5 03/13/2020 04:43 AM    WBC 6.9 04/02/2019 07:14 AM     Lab Results   Component Value Date/Time    HBA1C 6.4 (H) 03/13/2020 04:43 AM    HBA1C 6.6 (A) 09/10/2019 08:22 AM    HBA1C 7.4 (H) 04/02/2019 07:14 AM       Cardiac Imaging and Procedures Review:    No recent imaging to review      Assessment & Plan     1. Hx of CABG  EC-ECHOCARDIOGRAM COMPLETE W/O CONT    NM-CARDIAC STRESS TEST   2. Coronary artery disease involving native coronary artery of native heart without angina pectoris  NM-CARDIAC STRESS TEST   3. Dyslipidemia associated with type 2 diabetes mellitus (HCC)     4. Type 2 diabetes mellitus without complication, without long-term current use of insulin (HCC)     5. Erectile dysfunction, unspecified erectile dysfunction type  sildenafil citrate (VIAGRA) 100 MG tablet       Mr. Santacruz is now 4 years out s/p CABG.  Obtain transthoracic echocardiogram and exercise nuclear stress test next year as, per guidelines, he will be 5 years out from CABG.  This has been discussed with the patient who is in agreement with the plan.    Continue rosuvastatin 20 mg and aspirin 81 mg daily.  Well-controlled lipid panel.    Labs reviewed.  Diabetes also under well control.    Refill for sildenafil 100 mg provided per patient's request.  Advised patient to obtain future refill request  from his primary care physician as he is able to tolerate the medication without any adverse effects and he is not on a nitrate.      All of patient's excellent questions were answered to the best of my knowledge and to his satisfaction.  It was a pleasure seeing Mr. Hu Santacruz in my clinic today. Return in about 1 year with aforementioned tests completed prior to the appointment. Patient is aware to call the cardiology clinic with any questions or concerns.      Kishor Faye MD  [facility]  [facility], [address]  [address] [address]  Phone: [phone]  Fax: [phone]

## 2020-10-05 ENCOUNTER — APPOINTMENT (OUTPATIENT)
Dept: RADIOLOGY | Facility: MEDICAL CENTER | Age: 60
End: 2020-10-05
Attending: INTERNAL MEDICINE
Payer: COMMERCIAL

## 2020-10-12 ENCOUNTER — TELEPHONE (OUTPATIENT)
Dept: ENDOCRINOLOGY | Facility: MEDICAL CENTER | Age: 60
End: 2020-10-12

## 2020-10-12 DIAGNOSIS — E78.5 DYSLIPIDEMIA ASSOCIATED WITH TYPE 2 DIABETES MELLITUS (HCC): ICD-10-CM

## 2020-10-12 DIAGNOSIS — E11.69 DYSLIPIDEMIA ASSOCIATED WITH TYPE 2 DIABETES MELLITUS (HCC): ICD-10-CM

## 2020-10-12 DIAGNOSIS — E11.9 TYPE 2 DIABETES MELLITUS WITHOUT COMPLICATION, WITHOUT LONG-TERM CURRENT USE OF INSULIN (HCC): ICD-10-CM

## 2020-10-12 NOTE — TELEPHONE ENCOUNTER
Patient walked in because he needs a refill of trulicity 1.5 he also needs lab ordered before his appointment with Dr. Moreno in 11/24. He would like them sent to [facility].

## 2020-10-20 ENCOUNTER — OFFICE VISIT (OUTPATIENT)
Dept: MEDICAL GROUP | Age: 60
End: 2020-10-20
Payer: COMMERCIAL

## 2020-10-20 ENCOUNTER — TELEPHONE (OUTPATIENT)
Dept: ENDOCRINOLOGY | Facility: MEDICAL CENTER | Age: 60
End: 2020-10-20

## 2020-10-20 VITALS
DIASTOLIC BLOOD PRESSURE: 62 MMHG | HEIGHT: 72 IN | SYSTOLIC BLOOD PRESSURE: 118 MMHG | TEMPERATURE: 97.2 F | OXYGEN SATURATION: 98 % | BODY MASS INDEX: 24.19 KG/M2 | WEIGHT: 178.57 LBS | HEART RATE: 67 BPM

## 2020-10-20 DIAGNOSIS — Z23 NEED FOR VACCINATION: ICD-10-CM

## 2020-10-20 DIAGNOSIS — I25.10 CORONARY ARTERY DISEASE INVOLVING NATIVE CORONARY ARTERY OF NATIVE HEART WITHOUT ANGINA PECTORIS: ICD-10-CM

## 2020-10-20 DIAGNOSIS — E11.9 TYPE 2 DIABETES MELLITUS WITHOUT COMPLICATION, WITHOUT LONG-TERM CURRENT USE OF INSULIN (HCC): ICD-10-CM

## 2020-10-20 DIAGNOSIS — E78.5 DYSLIPIDEMIA ASSOCIATED WITH TYPE 2 DIABETES MELLITUS (HCC): ICD-10-CM

## 2020-10-20 DIAGNOSIS — E11.69 DYSLIPIDEMIA ASSOCIATED WITH TYPE 2 DIABETES MELLITUS (HCC): ICD-10-CM

## 2020-10-20 DIAGNOSIS — R22.1 MASS IN NECK: ICD-10-CM

## 2020-10-20 PROCEDURE — 90686 IIV4 VACC NO PRSV 0.5 ML IM: CPT | Performed by: PHYSICIAN ASSISTANT

## 2020-10-20 PROCEDURE — 99214 OFFICE O/P EST MOD 30 MIN: CPT | Mod: 25 | Performed by: PHYSICIAN ASSISTANT

## 2020-10-20 PROCEDURE — 90750 HZV VACC RECOMBINANT IM: CPT | Performed by: PHYSICIAN ASSISTANT

## 2020-10-20 PROCEDURE — 90472 IMMUNIZATION ADMIN EACH ADD: CPT | Performed by: PHYSICIAN ASSISTANT

## 2020-10-20 PROCEDURE — 90471 IMMUNIZATION ADMIN: CPT | Performed by: PHYSICIAN ASSISTANT

## 2020-10-20 RX ORDER — DULAGLUTIDE 1.5 MG/.5ML
1 INJECTION, SOLUTION SUBCUTANEOUS
Qty: 4 EACH | Refills: 1 | Status: SHIPPED | OUTPATIENT
Start: 2020-10-20 | End: 2020-11-24 | Stop reason: SDUPTHER

## 2020-10-20 ASSESSMENT — PATIENT HEALTH QUESTIONNAIRE - PHQ9: CLINICAL INTERPRETATION OF PHQ2 SCORE: 0

## 2020-10-20 ASSESSMENT — FIBROSIS 4 INDEX: FIB4 SCORE: 0.69

## 2020-10-20 NOTE — PROGRESS NOTES
Subjective:     Chief Complaint   Patient presents with   • Annual Wellness Visit   • Bump     On the back of neck. Pt states that it has gotten bigger 10+years     Hu Santacruz is a 60 y.o. male here today for evaluation and management of:    Mass in neck  New problem.  Reports mass in neck for at least 5 years. Nontender. States kid noticed and wondering if maybe it enlarged some. Would like it looked at.   No fever, chills or body aches. Reports some weight loss over the past year but attributes to dietary changes with COVID.    Dyslipidemia associated with type 2 diabetes mellitus (HCC)/CAD  Stable. Currently taking meds as directed. Under care of endo  Denies side effects or hypoglycemic events.  Denies side effects--no myalgias or abdominal pain.  Denies polyuria, polydipsia, blurred vision, or neuropathies.  He denies dizziness, claudication, or chest pain.    ROS   See HPI  Denies any recent fevers or chills. No nausea or vomiting. No diarrhea. No chest pains or shortness of breath. No lower extremity edema.    Allergies   Allergen Reactions   • Metoprolol        Current medicines (including changes today)  Current Outpatient Medications   Medication Sig Dispense Refill   • sildenafil citrate (VIAGRA) 100 MG tablet TAKE ONE TABLET BY MOUTH DAILY AS NEEDED FOR ERECTILE DYSFUNCTION *VIAGRA* 30 Tab 3   • rosuvastatin (CRESTOR) 20 MG Tab TAKE 1 TABLET BY MOUTH ONCE DAILY AT BEDTIME 90 Tab 4   • XIGDUO XR 5-1000 MG TABLET SR 24 HR TAKE 1 TABLET BY MOUTH TWICE DAILY 60 Tab 11   • pioglitazone (ACTOS) 30 MG Tab Take 1 Tab by mouth every day. 30 Tab 11   • aspirin EC (ECOTRIN) 81 MG Tablet Delayed Response Take 81 mg by mouth every day.     • vitamin D (CHOLECALCIFEROL) 1000 UNIT TABS Take 1,000 Units by mouth 2 Times a Day.     • Dulaglutide (TRULICITY) 1.5 MG/0.5ML Solution Pen-injector Inject 1 Each as instructed every 7 days. On Mon 4 Each 1     No current facility-administered medications for this  "visit.        Patient Active Problem List    Diagnosis Date Noted   • S/P CABG (coronary artery bypass graft) 12/20/2016     Priority: Medium   • CAD (coronary artery disease), native coronary artery 04/16/2012     Priority: Medium   • Dyslipidemia associated with type 2 diabetes mellitus (HCC) 04/16/2012     Priority: Medium   • Vasculogenic erectile dysfunction 02/24/2014     Priority: Low   • Calcifying tendinitis of shoulder 04/02/2013     Priority: Low   • Bicipital tenosynovitis 04/02/2013     Priority: Low   • Diabetes mellitus type II, uncontrolled ([facility]) 09/10/2019       Family History   Adopted: Yes   Problem Relation Age of Onset   • Heart Attack Neg Hx    • Heart Disease Neg Hx           Objective:     Vitals:    10/20/20 0810   BP: 118/62   BP Location: Left arm   Patient Position: Sitting   BP Cuff Size: Adult   Pulse: 67   Temp: 36.2 °C (97.2 °F)   TempSrc: Temporal   SpO2: 98%   Weight: 81 kg (178 lb 9.2 oz)   Height: 1.816 m (5' 11.5\")     Body mass index is 24.56 kg/m².     Physical Exam:  Constitutional: Alert, no distress, well-groomed.  Skin: Warm, dry, good turgor, no rashes in visible areas.  Eye: Equal, round and reactive, conjunctiva clear, lids normal.  ENMT: Lips without lesions, good dentition, moist mucous membranes.  Neck: Trachea midline, no thyromegaly. Soft, round mobile mass left posterior neck  Cardiovascular: Regular rate and rhythm.  Chest: Effort normal. Clear to auscultation throughout. No adventitious sounds.   Abdomen: Soft, no gross masses.  MSK: Normal gait, moves all extremities.  Monofilament testing with a 10 gram force: sensation intact: intact bilaterally  Visual Inspection: Feet without maceration, ulcers, fissures.  Pedal pulses: intact bilaterally  Neuro: Grossly non-focal. No cranial nerve deficit. Strength and sensation intact.   Psych: Alert and oriented x3, normal affect and mood.    Results for orders placed or performed in visit on 03/13/20   CBC WITH " DIFFERENTIAL   Result Value Ref Range    WBC 5.5 3.4 - 10.8 x10E3/uL    RBC 5.27 4.14 - 5.80 x10E6/uL    Hemoglobin 17.1 13.0 - 17.7 g/dL    Hematocrit 48.5 37.5 - 51.0 %    MCV 92 79 - 97 fL    MCH 32.4 26.6 - 33.0 pg    MCHC 35.3 31.5 - 35.7 g/dL    RDW 12.6 11.6 - 15.4 %    Platelet Count 268 150 - 450 x10E3/uL    Neutrophils-Polys 57 Not Estab. %    Lymphocytes 30 Not Estab. %    Monocytes 9 Not Estab. %    Eosinophils 3 Not Estab. %    Basophils 1 Not Estab. %    Immature Cells CANCELED     Neutrophils (Absolute) 3.1 1.4 - 7.0 x10E3/uL    Lymphs (Absolute) 1.6 0.7 - 3.1 x10E3/uL    Monos (Absolute) 0.5 0.1 - 0.9 x10E3/uL    Eos (Absolute) 0.1 0.0 - 0.4 x10E3/uL    Baso (Absolute) 0.1 0.0 - 0.2 x10E3/uL    Immature Granulocytes 0 Not Estab. %    Immature Granulocytes (abs) 0.0 0.0 - 0.1 x10E3/uL    Nucleated RBC CANCELED     Comments-Diff CANCELED    Comp Metabolic Panel   Result Value Ref Range    Glucose 132 (H) 65 - 99 mg/dL    Bun 16 6 - 24 mg/dL    Creatinine 1.06 0.76 - 1.27 mg/dL    GFR If Non  77 >59 mL/min/1.73    GFR If  89 >59 mL/min/1.73    Bun-Creatinine Ratio 15 9 - 20    Sodium 142 134 - 144 mmol/L    Potassium 5.1 3.5 - 5.2 mmol/L    Chloride 104 96 - 106 mmol/L    Co2 24 20 - 29 mmol/L    Calcium 9.8 8.7 - 10.2 mg/dL    Total Protein 6.5 6.0 - 8.5 g/dL    Albumin 4.8 3.8 - 4.9 g/dL    Globulin 1.7 1.5 - 4.5 g/dL    A-G Ratio 2.8 (H) 1.2 - 2.2    Total Bilirubin 0.4 0.0 - 1.2 mg/dL    Alkaline Phosphatase 64 39 - 117 IU/L    AST(SGOT) 15 0 - 40 IU/L    ALT(SGPT) 23 0 - 44 IU/L   LIPID PANEL   Result Value Ref Range    Cholesterol,Tot 159 100 - 199 mg/dL    Triglycerides 97 0 - 149 mg/dL    HDL 53 >39 mg/dL    VLDL Cholesterol Calc 19 5 - 40 mg/dL    LDL 87 0 - 99 mg/dL    Comment: CANCELED    MICROALB/CREAT RATIO RAND. UR   Result Value Ref Range    Creatinine, Random Urine 65.9 Not Estab. mg/dL    Microalbumin, Urine Random <3.0 Not Estab. ug/mL    Albumin /  Creatinine Ratio <5 0 - 29 mg/g creat   HEMOGLOBIN A1C   Result Value Ref Range    Glycohemoglobin 6.4 (H) 4.8 - 5.6 %   HEP C VIRUS ANTIBODY   Result Value Ref Range    Hepatitis C Antibody 0.1 0.0 - 0.9 s/co ratio     Reviewed and discussed above labs with patient in office.      Assessment and Plan:   The following treatment plan was discussed:     1. Mass in neck  We will obtain ultrasound for further evaluation. Seems most consistent with lipoma--discussed management if so. Advised near posterior cervical lymph nodes and seems to be lipoma but would like to further assess.  - US-SOFT TISSUES OF HEAD - NECK; Future    2. Dyslipidemia associated with type 2 diabetes mellitus (HCC)  3. Coronary artery disease involving native coronary artery of native heart without angina pectoris  Stable. Continue current medicines. Monitor labs regularly. Follow-up with specialists as directed.    4. Need for vaccination  VIS' given. Informed consent obtained. Vaccines administered in office.  - Shingrix Vaccine  - Influenza Vaccine Quad Injection (PF)    Followup: Return in about 1 year (around 10/20/2021) for annual exam, sooner if needed.

## 2020-11-05 ENCOUNTER — HOSPITAL ENCOUNTER (OUTPATIENT)
Dept: RADIOLOGY | Facility: MEDICAL CENTER | Age: 60
End: 2020-11-05
Attending: PHYSICIAN ASSISTANT
Payer: COMMERCIAL

## 2020-11-05 DIAGNOSIS — R22.1 MASS IN NECK: ICD-10-CM

## 2020-11-05 LAB
ALBUMIN SERPL-MCNC: 4.6 G/DL (ref 3.8–4.9)
ALBUMIN/CREAT UR: <4 MG/G CREAT (ref 0–29)
ALBUMIN/GLOB SERPL: 2.6 {RATIO} (ref 1.2–2.2)
ALP SERPL-CCNC: 70 IU/L (ref 39–117)
ALT SERPL-CCNC: 28 IU/L (ref 0–44)
AST SERPL-CCNC: 19 IU/L (ref 0–40)
BILIRUB SERPL-MCNC: 0.4 MG/DL (ref 0–1.2)
BUN SERPL-MCNC: 14 MG/DL (ref 6–24)
BUN/CREAT SERPL: 13 (ref 9–20)
CALCIUM SERPL-MCNC: 9.3 MG/DL (ref 8.7–10.2)
CHLORIDE SERPL-SCNC: 102 MMOL/L (ref 96–106)
CHOLEST SERPL-MCNC: 142 MG/DL (ref 100–199)
CO2 SERPL-SCNC: 23 MMOL/L (ref 20–29)
CREAT SERPL-MCNC: 1.07 MG/DL (ref 0.76–1.27)
CREAT UR-MCNC: 69 MG/DL
GLOBULIN SER CALC-MCNC: 1.8 G/DL (ref 1.5–4.5)
GLUCOSE SERPL-MCNC: 142 MG/DL (ref 65–99)
HBA1C MFR BLD: 6.4 % (ref 4.8–5.6)
HDLC SERPL-MCNC: 55 MG/DL
LABORATORY COMMENT REPORT: NORMAL
LDLC SERPL CALC-MCNC: 75 MG/DL (ref 0–99)
MICROALBUMIN UR-MCNC: <3 UG/ML
POTASSIUM SERPL-SCNC: 4.6 MMOL/L (ref 3.5–5.2)
PROT SERPL-MCNC: 6.4 G/DL (ref 6–8.5)
SODIUM SERPL-SCNC: 141 MMOL/L (ref 134–144)
TRIGL SERPL-MCNC: 57 MG/DL (ref 0–149)
TSH SERPL DL<=0.005 MIU/L-ACNC: 1.23 UIU/ML (ref 0.45–4.5)
VLDLC SERPL CALC-MCNC: 12 MG/DL (ref 5–40)

## 2020-11-05 PROCEDURE — 76536 US EXAM OF HEAD AND NECK: CPT

## 2020-11-17 DIAGNOSIS — E11.9 TYPE 2 DIABETES MELLITUS WITHOUT COMPLICATION, WITHOUT LONG-TERM CURRENT USE OF INSULIN (HCC): ICD-10-CM

## 2020-11-17 RX ORDER — PIOGLITAZONEHYDROCHLORIDE 30 MG/1
30 TABLET ORAL DAILY
Qty: 90 TAB | Refills: 1 | Status: SHIPPED | OUTPATIENT
Start: 2020-11-17 | End: 2020-11-24 | Stop reason: SDUPTHER

## 2020-11-24 ENCOUNTER — TELEMEDICINE (OUTPATIENT)
Dept: ENDOCRINOLOGY | Facility: MEDICAL CENTER | Age: 60
End: 2020-11-24
Attending: INTERNAL MEDICINE
Payer: COMMERCIAL

## 2020-11-24 DIAGNOSIS — E11.9 TYPE 2 DIABETES MELLITUS WITHOUT COMPLICATION, WITHOUT LONG-TERM CURRENT USE OF INSULIN (HCC): ICD-10-CM

## 2020-11-24 DIAGNOSIS — E11.69 DYSLIPIDEMIA ASSOCIATED WITH TYPE 2 DIABETES MELLITUS (HCC): ICD-10-CM

## 2020-11-24 DIAGNOSIS — Z79.84 LONG TERM (CURRENT) USE OF ORAL HYPOGLYCEMIC DRUGS: ICD-10-CM

## 2020-11-24 DIAGNOSIS — E78.5 DYSLIPIDEMIA ASSOCIATED WITH TYPE 2 DIABETES MELLITUS (HCC): ICD-10-CM

## 2020-11-24 DIAGNOSIS — E11.9 CONTROLLED TYPE 2 DIABETES MELLITUS WITHOUT COMPLICATION, WITHOUT LONG-TERM CURRENT USE OF INSULIN (HCC): ICD-10-CM

## 2020-11-24 PROCEDURE — 999999 HB NO CHARGE: Performed by: INTERNAL MEDICINE

## 2020-11-24 PROCEDURE — 99214 OFFICE O/P EST MOD 30 MIN: CPT | Mod: 95,CR | Performed by: INTERNAL MEDICINE

## 2020-11-24 RX ORDER — DAPAGLIFLOZIN AND METFORMIN HYDROCHLORIDE 5; 1000 MG/1; MG/1
1 TABLET, FILM COATED, EXTENDED RELEASE ORAL 2 TIMES DAILY
Qty: 60 TAB | Refills: 11 | Status: SHIPPED | OUTPATIENT
Start: 2020-11-24 | End: 2021-11-29

## 2020-11-24 RX ORDER — PIOGLITAZONEHYDROCHLORIDE 30 MG/1
30 TABLET ORAL DAILY
Qty: 90 TAB | Refills: 3 | Status: SHIPPED | OUTPATIENT
Start: 2020-11-24 | End: 2022-02-18 | Stop reason: SDUPTHER

## 2020-11-24 RX ORDER — DULAGLUTIDE 1.5 MG/.5ML
1 INJECTION, SOLUTION SUBCUTANEOUS
Qty: 4 EACH | Refills: 11 | Status: SHIPPED | OUTPATIENT
Start: 2020-11-24 | End: 2020-12-29 | Stop reason: SDUPTHER

## 2020-11-24 NOTE — PROGRESS NOTES
CHIEF COMPLAINT: Patient is here for follow up of Type 2 Diabetes Mellitus. Patient was presented for a telehealth consultation via secure and encrypted videoconferencing technology. This encounter was conducted via Zoom . Verbal consent was obtained. Patient's identity was verified.      HPI:     Hu Santacruz is a 60 y.o. male with Type 2 Diabetes Mellitus here for follow up.    Labs from 11/4/2020 HbA1c is good at 6.4%    On follow up he is taking Xigduo Xr 5/1000mg twice a day, Pioglitazone 30mg daily, and Trulicity 1.5mg weekly    H admits to med noncompliance but is happy he is doing well  He denies Se    He has hyperlipidemia and CAD  He is taking generic crestor  His LDL-C was at goal at less than 100 on 11/2020        BG Diary:11/24/20  patient did not bring meter    Weight has been stable    Diabetes Complications   Retinopathy: No known retinopathy.  Last eye exam: He reports that he had a negative eye exam with his ophthalmologist on 2020 he does not recall the details  Neuropathy: Denies paresthesias or numbness in hands or feet. Denies any foot wounds.  Exercise: Minimal.  Diet: Fair.  Patient's medications, allergies, and social histories were reviewed and updated as appropriate.    ROS:     CONS:     No fever, no chills   EYES:     No diplopia, no blurry vision   CV:           No chest pain, no palpitations   PULM:     No SOB, no cough, no hemoptysis.   GI:            No nausea, no vomiting, no diarrhea, no constipation   ENDO:     No polyuria, no polydipsia, no heat intolerance, no cold intolerance       Past Medical History:  Problem List:  2019-09: Diabetes mellitus type II, uncontrolled ([facility])  2017-02: Fatigue  2016-12: S/P CABG (coronary artery bypass graft)  2016-12: CAD (coronary artery disease)  2016-11: Chest pain  2016-10: Situational anxiety  2014-12: Diabetes mellitus type II, uncontrolled ([facility])  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  2013-04: Calcifying tendinitis of  shoulder  2013-04: Bicipital tenosynovitis  2013-04: Rotator cuff (capsule) sprain  2012-04: Adjustment disorder  2012-04: CAD (coronary artery disease), native coronary artery  2012-04: S/P coronary artery stent placement  2012-04: Dyslipidemia associated with type 2 diabetes mellitus (HCC)  2012-04: Hyperlipidemia  2012-04: Overweight  2012-04: Old myocardial infarction      Past Surgical History:  Past Surgical History:   Procedure Laterality Date   • MULTIPLE CORONARY ARTERY BYPASS ENDO VEIN HARVEST  12/6/2016    Procedure: MULTIPLE CORONARY ARTERY BYPASS ENDO VEIN HARVEST X3 WITH LAITH;  Surgeon: Juan Pablo Schmitz M.D.;  Location: SURGERY [facility];  Service:    • SHOULDER ARTHROSCOPY W/ ROTATOR CUFF REPAIR  4/2/2013    Performed by Angelique Romero M.D. at [facility]   • SHOULDER DECOMPRESSION ARTHROSCOPIC  4/2/2013    Performed by Angelique Romero M.D. at [facility]   • JOINT INJECTION DIAGNOSTIC  4/2/2013    Performed by Angelique Romero M.D. at [facility]   • CLAVICLE DISTAL EXCISION  4/2/2013    Performed by Angelique Romero M.D. at [facility]   • SHOULDER ARTHROSCOPY W/ BICIPITAL TENODESIS REPAIR  4/2/2013    Performed by Angelique Romero M.D. at [facility]   • STENT PLACEMENT  2008    x3   • DENTAL EXTRACTION(S)  1997    wisdom teeth   • KNEE ARTHROSCOPY  1983    left   • PATELLA ORIF  1980    bilateral   • ZZZ CARDIAC CATH          Allergies:  Metoprolol     Social History:  Social History     Tobacco Use   • Smoking status: Never Smoker   • Smokeless tobacco: Never Used   Substance Use Topics   • Alcohol use: Yes     Alcohol/week: 3.0 oz     Types: 6 Cans of beer per week     Frequency: 2-4 times a month     Drinks per session: 1 or 2     Binge frequency: Never     Comment: 2 per week   • Drug use: No     Comment: Former Marijuana usage        Family History:   family history is not on file. He was adopted.      PHYSICAL  EXAM:   OBJECTIVE:  Vital signs: There were no vitals taken for this visit.  GENERAL: Well-developed, well-nourished in no apparent distress.   EYE:  No ocular asymmetry, PERRLA  HENT: Pink, moist mucous membranes.    NECK: No thyromegaly.   CARDIOVASCULAR:  No murmurs  LUNGS: Clear breath sounds  ABDOMEN: Soft, nontender   EXTREMITIES: No clubbing, cyanosis, or edema.   NEUROLOGICAL: No gross focal motor abnormalities   LYMPH: No cervical adenopathy seen  SKIN: No rashes, lesions.   Visual Inspection: Feet without maceration, ulcers, or fissures.      Labs:  Lab Results   Component Value Date/Time    HBA1C 6.4 (H) 11/04/2020 04:31 AM    HBA1C 6.6 (A) 09/10/2019 08:22 AM        Lab Results   Component Value Date/Time    WBC 5.5 03/13/2020 04:43 AM    WBC 6.9 04/02/2019 07:14 AM    RBC 5.27 03/13/2020 04:43 AM    RBC 5.43 04/02/2019 07:14 AM    HEMOGLOBIN 17.1 03/13/2020 04:43 AM    HEMOGLOBIN 17.3 04/02/2019 07:14 AM    MCV 92 03/13/2020 04:43 AM    MCV 94.8 04/02/2019 07:14 AM    MCH 32.4 03/13/2020 04:43 AM    MCH 31.9 04/02/2019 07:14 AM    MCHC 35.3 03/13/2020 04:43 AM    MCHC 33.6 (L) 04/02/2019 07:14 AM    RDW 12.6 03/13/2020 04:43 AM    RDW 42.3 04/02/2019 07:14 AM    MPV 9.5 04/02/2019 07:14 AM       Lab Results   Component Value Date/Time    SODIUM 141 11/04/2020 04:31 AM    SODIUM 139 04/02/2019 07:14 AM    POTASSIUM 4.6 11/04/2020 04:31 AM    POTASSIUM 4.4 04/02/2019 07:14 AM    CHLORIDE 102 11/04/2020 04:31 AM    CHLORIDE 102 04/02/2019 07:14 AM    CO2 23 11/04/2020 04:31 AM    CO2 28 04/02/2019 07:14 AM    ANION 9.0 04/02/2019 07:14 AM    GLUCOSE 142 (H) 11/04/2020 04:31 AM    GLUCOSE 190 (H) 04/02/2019 07:14 AM    BUN 14 11/04/2020 04:31 AM    BUN 15 04/02/2019 07:14 AM    CREATININE 1.07 11/04/2020 04:31 AM    CREATININE 0.97 04/02/2019 07:14 AM    CALCIUM 9.3 11/04/2020 04:31 AM    CALCIUM 9.5 04/02/2019 07:14 AM    ASTSGOT 19 11/04/2020 04:31 AM    ASTSGOT 19 04/02/2019 07:14 AM    ALTSGPT 28  11/04/2020 04:31 AM    ALTSGPT 28 04/02/2019 07:14 AM    TBILIRUBIN 0.4 11/04/2020 04:31 AM    TBILIRUBIN 0.6 04/02/2019 07:14 AM    ALBUMIN 4.6 11/04/2020 04:31 AM    ALBUMIN 4.4 04/02/2019 07:14 AM    TOTPROTEIN 6.4 11/04/2020 04:31 AM    TOTPROTEIN 6.3 04/02/2019 07:14 AM    GLOBULIN 1.8 11/04/2020 04:31 AM    GLOBULIN 1.9 04/02/2019 07:14 AM    AGRATIO 2.6 (H) 11/04/2020 04:31 AM    AGRATIO 2.3 04/02/2019 07:14 AM       Lab Results   Component Value Date/Time    CHOLSTRLTOT 142 11/04/2020 0431    TRIGLYCERIDE 57 11/04/2020 0431    HDL 55 11/04/2020 0431    LDL 87 03/13/2020 0443       Lab Results   Component Value Date/Time    MALBCRT see below 04/02/2019 07:14 AM    MICROALBUR <0.7 04/02/2019 07:14 AM    MICRALB <3.0 11/04/2020 04:31 AM        Lab Results   Component Value Date/Time    TSHULTRASEN 0.990 04/02/2019 0714     No results found for: FREEDIR  No results found for: FREET3  No results found for: THYSTIMIG        ASSESSMENT/PLAN:     1. Controlled type 2 diabetes mellitus without complication, without long-term current use of insulin (HCC)  Controlled  Continue current medications  I refilled his xigduo Trulicity and Actos  Reminded patient to practice plate method of eating  Remind patient to get regular annual dilated diabetic eye exams  Foot inspection was done today  I will see him again in 6 months with repeat of his A1c    2. Dyslipidemia associated with type 2 diabetes mellitus (HCC)  Controlled  Repeat labs in 12 months    3. Long term (current) use of oral hypoglycemic drugs  Patient is oral agents for t2dm        No follow-ups on file.      This patient during there office visit today was started on a new medication.  Side effects of the new medication were discussed with the patient today in the office.     Thank you kindly for allowing me to participate in the diabetes care plan for this patient.    Tone Moreno MD, FACE, [facility]  11/24/20    CC:   Jen Ortiz P.A.-C.

## 2020-12-29 ENCOUNTER — PATIENT MESSAGE (OUTPATIENT)
Dept: ENDOCRINOLOGY | Facility: MEDICAL CENTER | Age: 60
End: 2020-12-29

## 2020-12-29 DIAGNOSIS — E11.9 CONTROLLED TYPE 2 DIABETES MELLITUS WITHOUT COMPLICATION, WITHOUT LONG-TERM CURRENT USE OF INSULIN (HCC): ICD-10-CM

## 2020-12-29 RX ORDER — DULAGLUTIDE 1.5 MG/.5ML
1 INJECTION, SOLUTION SUBCUTANEOUS
Qty: 4 EACH | Refills: 11 | Status: SHIPPED | OUTPATIENT
Start: 2020-12-29 | End: 2021-12-13

## 2021-03-15 DIAGNOSIS — Z23 NEED FOR VACCINATION: ICD-10-CM

## 2021-03-31 ENCOUNTER — IMMUNIZATION (OUTPATIENT)
Dept: FAMILY PLANNING/WOMEN'S HEALTH CLINIC | Facility: IMMUNIZATION CENTER | Age: 61
End: 2021-03-31
Attending: INTERNAL MEDICINE
Payer: COMMERCIAL

## 2021-03-31 DIAGNOSIS — Z23 NEED FOR VACCINATION: ICD-10-CM

## 2021-03-31 DIAGNOSIS — Z23 ENCOUNTER FOR VACCINATION: Primary | ICD-10-CM

## 2021-04-02 PROCEDURE — 0001A PFIZER SARS-COV-2 VACCINE: CPT

## 2021-04-02 PROCEDURE — 91300 PFIZER SARS-COV-2 VACCINE: CPT

## 2021-04-29 ENCOUNTER — IMMUNIZATION (OUTPATIENT)
Dept: FAMILY PLANNING/WOMEN'S HEALTH CLINIC | Facility: IMMUNIZATION CENTER | Age: 61
End: 2021-04-29
Payer: COMMERCIAL

## 2021-04-29 DIAGNOSIS — Z23 ENCOUNTER FOR VACCINATION: Primary | ICD-10-CM

## 2021-04-29 PROCEDURE — 91300 PFIZER SARS-COV-2 VACCINE: CPT | Performed by: NURSE PRACTITIONER

## 2021-04-29 PROCEDURE — 0002A PFIZER SARS-COV-2 VACCINE: CPT | Performed by: NURSE PRACTITIONER

## 2021-05-06 DIAGNOSIS — N52.9 ERECTILE DYSFUNCTION, UNSPECIFIED ERECTILE DYSFUNCTION TYPE: ICD-10-CM

## 2021-05-06 RX ORDER — SILDENAFIL 100 MG/1
TABLET, FILM COATED ORAL
Qty: 30 TABLET | Refills: 2 | Status: SHIPPED | OUTPATIENT
Start: 2021-05-06 | End: 2021-10-21

## 2021-06-27 DIAGNOSIS — E78.5 DYSLIPIDEMIA ASSOCIATED WITH TYPE 2 DIABETES MELLITUS (HCC): ICD-10-CM

## 2021-06-27 DIAGNOSIS — I25.10 CORONARY ARTERY DISEASE INVOLVING NATIVE CORONARY ARTERY OF NATIVE HEART WITHOUT ANGINA PECTORIS: Chronic | ICD-10-CM

## 2021-06-27 DIAGNOSIS — E11.69 DYSLIPIDEMIA ASSOCIATED WITH TYPE 2 DIABETES MELLITUS (HCC): ICD-10-CM

## 2021-06-28 RX ORDER — ROSUVASTATIN CALCIUM 20 MG/1
TABLET, COATED ORAL
Qty: 90 TABLET | Refills: 3 | Status: SHIPPED | OUTPATIENT
Start: 2021-06-28 | End: 2022-07-21 | Stop reason: SDUPTHER

## 2021-10-15 ENCOUNTER — TELEPHONE (OUTPATIENT)
Dept: ENDOCRINOLOGY | Facility: MEDICAL CENTER | Age: 61
End: 2021-10-15

## 2021-10-15 DIAGNOSIS — E78.5 DYSLIPIDEMIA ASSOCIATED WITH TYPE 2 DIABETES MELLITUS (HCC): ICD-10-CM

## 2021-10-15 DIAGNOSIS — Z79.84 LONG TERM (CURRENT) USE OF ORAL HYPOGLYCEMIC DRUGS: ICD-10-CM

## 2021-10-15 DIAGNOSIS — E11.69 DYSLIPIDEMIA ASSOCIATED WITH TYPE 2 DIABETES MELLITUS (HCC): ICD-10-CM

## 2021-10-15 DIAGNOSIS — E11.9 CONTROLLED TYPE 2 DIABETES MELLITUS WITHOUT COMPLICATION, WITHOUT LONG-TERM CURRENT USE OF INSULIN (HCC): ICD-10-CM

## 2021-10-15 NOTE — TELEPHONE ENCOUNTER
Dear Sarah  please contact the patient I ordered labs they are in [place], they are fasting,     please make sure the patient has a follow-up appointment

## 2021-10-15 NOTE — TELEPHONE ENCOUNTER
Hello,    Spoke with the patient he is requesting a call because he needs an appointment and orders for lab work. Can you please call?    Thanks,  Basia

## 2021-10-15 NOTE — TELEPHONE ENCOUNTER
Phone Number Called: [phone] (home) [phone] (work)     Call outcome: Spoke to patient regarding message below.    Message: I have called and spoke with patient and he has made an appointment and he needs lab order.      Thank you.

## 2021-10-21 DIAGNOSIS — N52.9 ERECTILE DYSFUNCTION, UNSPECIFIED ERECTILE DYSFUNCTION TYPE: ICD-10-CM

## 2021-10-22 RX ORDER — SILDENAFIL 100 MG/1
TABLET, FILM COATED ORAL
Qty: 30 TABLET | Refills: 2 | Status: SHIPPED | OUTPATIENT
Start: 2021-10-22 | End: 2022-02-18 | Stop reason: SDUPTHER

## 2021-10-26 ENCOUNTER — OFFICE VISIT (OUTPATIENT)
Dept: MEDICAL GROUP | Age: 61
End: 2021-10-26
Payer: COMMERCIAL

## 2021-10-26 VITALS
WEIGHT: 190.2 LBS | HEART RATE: 71 BPM | HEIGHT: 71 IN | BODY MASS INDEX: 26.63 KG/M2 | TEMPERATURE: 97.5 F | OXYGEN SATURATION: 98 % | DIASTOLIC BLOOD PRESSURE: 60 MMHG | SYSTOLIC BLOOD PRESSURE: 122 MMHG

## 2021-10-26 DIAGNOSIS — E78.5 DYSLIPIDEMIA ASSOCIATED WITH TYPE 2 DIABETES MELLITUS (HCC): ICD-10-CM

## 2021-10-26 DIAGNOSIS — E11.59 TYPE 2 DIABETES MELLITUS WITH VASCULAR DISEASE (HCC): ICD-10-CM

## 2021-10-26 DIAGNOSIS — H61.22 LEFT EAR IMPACTED CERUMEN: ICD-10-CM

## 2021-10-26 DIAGNOSIS — I25.10 CORONARY ARTERY DISEASE INVOLVING NATIVE CORONARY ARTERY OF NATIVE HEART WITHOUT ANGINA PECTORIS: Chronic | ICD-10-CM

## 2021-10-26 DIAGNOSIS — E11.69 DYSLIPIDEMIA ASSOCIATED WITH TYPE 2 DIABETES MELLITUS (HCC): ICD-10-CM

## 2021-10-26 DIAGNOSIS — Z23 NEED FOR VACCINATION: ICD-10-CM

## 2021-10-26 LAB
HBA1C MFR BLD: 6.5 % (ref 0–5.6)
INT CON NEG: ABNORMAL
INT CON POS: ABNORMAL

## 2021-10-26 PROCEDURE — 83036 HEMOGLOBIN GLYCOSYLATED A1C: CPT | Performed by: PHYSICIAN ASSISTANT

## 2021-10-26 PROCEDURE — 99214 OFFICE O/P EST MOD 30 MIN: CPT | Mod: 25 | Performed by: PHYSICIAN ASSISTANT

## 2021-10-26 PROCEDURE — 90686 IIV4 VACC NO PRSV 0.5 ML IM: CPT | Performed by: PHYSICIAN ASSISTANT

## 2021-10-26 PROCEDURE — 90471 IMMUNIZATION ADMIN: CPT | Performed by: PHYSICIAN ASSISTANT

## 2021-10-26 PROCEDURE — 69210 REMOVE IMPACTED EAR WAX UNI: CPT | Mod: LT | Performed by: PHYSICIAN ASSISTANT

## 2021-10-26 ASSESSMENT — FIBROSIS 4 INDEX: FIB4 SCORE: 0.8

## 2021-10-26 ASSESSMENT — PATIENT HEALTH QUESTIONNAIRE - PHQ9: CLINICAL INTERPRETATION OF PHQ2 SCORE: 0

## 2021-10-26 NOTE — LETTER
[facility]  Jen Ortiz P.A.-C.  [address]  [place] [address]  Fax: [phone]   Authorization for Release/Disclosure of   Protected Health Information   Name: SAMARA DE LA CRUZ : 1960 SSN: [id number]   Address: [facility] [place] [address] Phone:    [phone] (home) [phone] (work)   I authorize the entity listed below to release/disclose the PHI below to:   [facility]/Jen Ortiz P.A.-C. and Jen Ortiz P.A.-C.   Provider or Entity Name: Uyen Mcdonald OD     Address   City, State, Zip   Phone:      Fax:     Reason for request: continuity of care   Information to be released:    [  ] LAST COLONOSCOPY,  including any PATH REPORT and follow-up  [  ] LAST FIT/COLOGUARD RESULT [  ] LAST DEXA  [  ] LAST MAMMOGRAM  [  ] LAST PAP  [  ] LAST LABS [  ] RETINA EXAM REPORT  [  ] IMMUNIZATION RECORDS  [  ] Release all info      [  ] Check here and initial the line next to each item to release ALL health information INCLUDING  _____ Care and treatment for drug and / or alcohol abuse  _____ HIV testing, infection status, or AIDS  _____ Genetic Testing    DATES OF SERVICE OR TIME PERIOD TO BE DISCLOSED: _____________  I understand and acknowledge that:  * This Authorization may be revoked at any time by you in writing, except if your health information has already been used or disclosed.  * Your health information that will be used or disclosed as a result of you signing this authorization could be re-disclosed by the recipient. If this occurs, your re-disclosed health information may no longer be protected by State or Federal laws.  * You may refuse to sign this Authorization. Your refusal will not affect your ability to obtain treatment.  * This Authorization becomes effective upon signing and will  on (date) __________.      If no date is indicated, this Authorization will  one (1) year from the signature date.    Name: Samara Coates [place]    Signature:   Date:      10/26/2021       PLEASE FAX REQUESTED RECORDS BACK TO: [phone]

## 2021-10-26 NOTE — LETTER
[facility]  Jen Ortiz P.A.-C.  [address]  [place] [address]  Fax: [phone]   Authorization for Release/Disclosure of   Protected Health Information   Name: SAMARA SANTACRUZ : 1960 SSN: [id number]   Address: [facility] [place] [address] Phone:    [phone] (home) [phone] (work)   I authorize the entity listed below to release/disclose the PHI below to:   [facility]/Jen Ortiz P.A.-C. and Jen Ortiz P.A.-C.   Provider or Entity Name:      Address   City, State, Zip   Phone:      Fax:     Reason for request: continuity of care   Information to be released:    [  ] LAST COLONOSCOPY,  including any PATH REPORT and follow-up  [  ] LAST FIT/COLOGUARD RESULT [  ] LAST DEXA  [  ] LAST MAMMOGRAM  [  ] LAST PAP  [  ] LAST LABS [  ] RETINA EXAM REPORT  [  ] IMMUNIZATION RECORDS  [  ] Release all info      [  ] Check here and initial the line next to each item to release ALL health information INCLUDING  _____ Care and treatment for drug and / or alcohol abuse  _____ HIV testing, infection status, or AIDS  _____ Genetic Testing    DATES OF SERVICE OR TIME PERIOD TO BE DISCLOSED: _____________  I understand and acknowledge that:  * This Authorization may be revoked at any time by you in writing, except if your health information has already been used or disclosed.  * Your health information that will be used or disclosed as a result of you signing this authorization could be re-disclosed by the recipient. If this occurs, your re-disclosed health information may no longer be protected by State or Federal laws.  * You may refuse to sign this Authorization. Your refusal will not affect your ability to obtain treatment.  * This Authorization becomes effective upon signing and will  on (date) __________.      If no date is indicated, this Authorization will  one (1) year from the signature date.    Name: Samara Santacruz    Signature:   Date:     10/26/2021       PLEASE  FAX REQUESTED RECORDS BACK TO: [phone]

## 2021-10-28 PROBLEM — E11.59 TYPE 2 DIABETES MELLITUS WITH VASCULAR DISEASE (HCC): Status: ACTIVE | Noted: 2021-10-28

## 2021-11-29 DIAGNOSIS — E11.9 CONTROLLED TYPE 2 DIABETES MELLITUS WITHOUT COMPLICATION, WITHOUT LONG-TERM CURRENT USE OF INSULIN (HCC): ICD-10-CM

## 2021-11-29 RX ORDER — DAPAGLIFLOZIN AND METFORMIN HYDROCHLORIDE 5; 1000 MG/1; MG/1
TABLET, FILM COATED, EXTENDED RELEASE ORAL
Qty: 60 TABLET | Refills: 0 | Status: SHIPPED | OUTPATIENT
Start: 2021-11-29 | End: 2022-02-18 | Stop reason: SDUPTHER

## 2021-12-13 DIAGNOSIS — E11.9 CONTROLLED TYPE 2 DIABETES MELLITUS WITHOUT COMPLICATION, WITHOUT LONG-TERM CURRENT USE OF INSULIN (HCC): ICD-10-CM

## 2021-12-13 RX ORDER — DULAGLUTIDE 1.5 MG/.5ML
INJECTION, SOLUTION SUBCUTANEOUS
Qty: 4 ML | Refills: 0 | Status: SHIPPED | OUTPATIENT
Start: 2021-12-13 | End: 2022-01-10

## 2021-12-13 NOTE — TELEPHONE ENCOUNTER
Received request via: Pharmacy    Was the patient seen in the last year in this department? No Last seen 11/24/2020    Does the patient have an active prescription (recently filled or refills available) for medication(s) requested? No

## 2022-01-10 DIAGNOSIS — E11.9 CONTROLLED TYPE 2 DIABETES MELLITUS WITHOUT COMPLICATION, WITHOUT LONG-TERM CURRENT USE OF INSULIN (HCC): ICD-10-CM

## 2022-01-10 RX ORDER — DULAGLUTIDE 1.5 MG/.5ML
INJECTION, SOLUTION SUBCUTANEOUS
Qty: 4 ML | Refills: 0 | Status: SHIPPED | OUTPATIENT
Start: 2022-01-10 | End: 2022-02-15

## 2022-02-08 LAB
ALBUMIN SERPL-MCNC: 4.6 G/DL (ref 3.8–4.9)
ALBUMIN/CREAT UR: <4 MG/G CREAT (ref 0–29)
ALBUMIN/GLOB SERPL: 2 {RATIO} (ref 1.2–2.2)
ALP SERPL-CCNC: 66 IU/L (ref 44–121)
ALT SERPL-CCNC: 17 IU/L (ref 0–44)
AST SERPL-CCNC: 13 IU/L (ref 0–40)
BILIRUB SERPL-MCNC: 0.4 MG/DL (ref 0–1.2)
BUN SERPL-MCNC: 17 MG/DL (ref 8–27)
BUN/CREAT SERPL: 17 (ref 10–24)
CALCIUM SERPL-MCNC: 9.4 MG/DL (ref 8.6–10.2)
CHLORIDE SERPL-SCNC: 103 MMOL/L (ref 96–106)
CHOLEST SERPL-MCNC: 172 MG/DL (ref 100–199)
CO2 SERPL-SCNC: 25 MMOL/L (ref 20–29)
CREAT SERPL-MCNC: 1 MG/DL (ref 0.76–1.27)
CREAT UR-MCNC: 77.4 MG/DL
GLOBULIN SER CALC-MCNC: 2.3 G/DL (ref 1.5–4.5)
GLUCOSE SERPL-MCNC: 170 MG/DL (ref 65–99)
HDLC SERPL-MCNC: 47 MG/DL
LABORATORY COMMENT REPORT: ABNORMAL
LDLC SERPL CALC-MCNC: 106 MG/DL (ref 0–99)
MICROALBUMIN UR-MCNC: <3 UG/ML
POTASSIUM SERPL-SCNC: 4.7 MMOL/L (ref 3.5–5.2)
PROT SERPL-MCNC: 6.9 G/DL (ref 6–8.5)
SODIUM SERPL-SCNC: 140 MMOL/L (ref 134–144)
TRIGL SERPL-MCNC: 106 MG/DL (ref 0–149)
VLDLC SERPL CALC-MCNC: 19 MG/DL (ref 5–40)

## 2022-02-14 DIAGNOSIS — E11.9 CONTROLLED TYPE 2 DIABETES MELLITUS WITHOUT COMPLICATION, WITHOUT LONG-TERM CURRENT USE OF INSULIN (HCC): ICD-10-CM

## 2022-02-15 RX ORDER — DULAGLUTIDE 1.5 MG/.5ML
INJECTION, SOLUTION SUBCUTANEOUS
Qty: 4 ML | Refills: 0 | Status: SHIPPED | OUTPATIENT
Start: 2022-02-15 | End: 2022-02-18 | Stop reason: SDUPTHER

## 2022-02-18 ENCOUNTER — OFFICE VISIT (OUTPATIENT)
Dept: ENDOCRINOLOGY | Facility: MEDICAL CENTER | Age: 62
End: 2022-02-18
Attending: INTERNAL MEDICINE
Payer: COMMERCIAL

## 2022-02-18 VITALS
OXYGEN SATURATION: 98 % | HEIGHT: 71 IN | HEART RATE: 84 BPM | DIASTOLIC BLOOD PRESSURE: 64 MMHG | WEIGHT: 190 LBS | BODY MASS INDEX: 26.6 KG/M2 | SYSTOLIC BLOOD PRESSURE: 108 MMHG

## 2022-02-18 DIAGNOSIS — E11.9 CONTROLLED TYPE 2 DIABETES MELLITUS WITHOUT COMPLICATION, WITHOUT LONG-TERM CURRENT USE OF INSULIN (HCC): ICD-10-CM

## 2022-02-18 DIAGNOSIS — Z79.84 LONG TERM (CURRENT) USE OF ORAL HYPOGLYCEMIC DRUGS: ICD-10-CM

## 2022-02-18 DIAGNOSIS — E11.69 DYSLIPIDEMIA ASSOCIATED WITH TYPE 2 DIABETES MELLITUS (HCC): ICD-10-CM

## 2022-02-18 DIAGNOSIS — E78.5 DYSLIPIDEMIA ASSOCIATED WITH TYPE 2 DIABETES MELLITUS (HCC): ICD-10-CM

## 2022-02-18 DIAGNOSIS — N52.9 ERECTILE DYSFUNCTION, UNSPECIFIED ERECTILE DYSFUNCTION TYPE: ICD-10-CM

## 2022-02-18 LAB
HBA1C MFR BLD: 6.7 % (ref 0–5.6)
INT CON NEG: ABNORMAL
INT CON POS: ABNORMAL

## 2022-02-18 PROCEDURE — 99214 OFFICE O/P EST MOD 30 MIN: CPT | Performed by: INTERNAL MEDICINE

## 2022-02-18 PROCEDURE — 83036 HEMOGLOBIN GLYCOSYLATED A1C: CPT | Performed by: INTERNAL MEDICINE

## 2022-02-18 PROCEDURE — 99212 OFFICE O/P EST SF 10 MIN: CPT | Performed by: INTERNAL MEDICINE

## 2022-02-18 RX ORDER — DAPAGLIFLOZIN AND METFORMIN HYDROCHLORIDE 5; 1000 MG/1; MG/1
1 TABLET, FILM COATED, EXTENDED RELEASE ORAL 2 TIMES DAILY
Qty: 180 TABLET | Refills: 3 | Status: SHIPPED | OUTPATIENT
Start: 2022-02-18 | End: 2023-02-28 | Stop reason: SDUPTHER

## 2022-02-18 RX ORDER — SILDENAFIL 100 MG/1
TABLET, FILM COATED ORAL
Qty: 30 TABLET | Refills: 2 | Status: SHIPPED | OUTPATIENT
Start: 2022-02-18 | End: 2022-08-23 | Stop reason: SDUPTHER

## 2022-02-18 RX ORDER — PIOGLITAZONEHYDROCHLORIDE 30 MG/1
30 TABLET ORAL DAILY
Qty: 90 TABLET | Refills: 3 | Status: SHIPPED | OUTPATIENT
Start: 2022-02-18 | End: 2023-02-14

## 2022-02-18 RX ORDER — DULAGLUTIDE 1.5 MG/.5ML
1.5 INJECTION, SOLUTION SUBCUTANEOUS
Qty: 2 ML | Refills: 3 | Status: SHIPPED | OUTPATIENT
Start: 2022-02-18 | End: 2022-02-18

## 2022-02-18 RX ORDER — DULAGLUTIDE 3 MG/.5ML
3 INJECTION, SOLUTION SUBCUTANEOUS
Qty: 2 ML | Refills: 6 | Status: SHIPPED | OUTPATIENT
Start: 2022-02-18 | End: 2022-02-21

## 2022-02-18 ASSESSMENT — FIBROSIS 4 INDEX: FIB4 SCORE: 0.71

## 2022-02-18 ASSESSMENT — PATIENT HEALTH QUESTIONNAIRE - PHQ9: CLINICAL INTERPRETATION OF PHQ2 SCORE: 0

## 2022-02-19 NOTE — PROGRESS NOTES
CHIEF COMPLAINT: Patient is here for follow up of Type 2 Diabetes Mellitus.     HPI:     Hu Santacruz is a 60 y.o. male with Type 2 Diabetes Mellitus here for follow up.    Labs from 2/18/2022 show a1c was 6.7%  Labs from 10/26/2021 show a1c was 6.5%  Labs from 11/4/2020 show HbA1c was 6.4%        On follow up he is taking Xigduo XR 5/1000mg twice a day, Pioglitazone 30mg daily, and Trulicity 1.5mg weekly      He denies SE with his meds  He has cut back on sodas  He is requesting a refill of Viagra      He has hyperlipidemia and CAD  He is taking generic crestor  He admits to non adherence however  He admits to missing a lot of doses of his statin  His LDL-C  true from 75 to 106 on Feb 2022      He doesn't have diabetic kidney disease  UACR was < 30 on 2/18/2022      He doesn't have diabetic retinopathy based on his eye exam   10/26/2021        He denies foot sores  He denies numbness and pain and tingling in his feet        BG Diary:  patient did not bring meter    Weight has been stable    Diabetes Complications   Retinopathy: No known retinopathy.  Last eye exam: 10/26/2021  Neuropathy: Denies paresthesias or numbness in hands or feet. Denies any foot wounds.  Exercise: Minimal.  Diet: Fair.  Patient's medications, allergies, and social histories were reviewed and updated as appropriate.    ROS:     CONS:     No fever, no chills   EYES:     No diplopia, no blurry vision   CV:           No chest pain, no palpitations   PULM:     No SOB, no cough, no hemoptysis.   GI:            No nausea, no vomiting, no diarrhea, no constipation   ENDO:     No polyuria, no polydipsia, no heat intolerance, no cold intolerance       Past Medical History:  Problem List:  2021-10: Type 2 diabetes mellitus with vascular disease- ED ([facility])  2020-11: Long term (current) use of oral hypoglycemic drugs  2017-02: Fatigue  2016-12: S/P CABG (coronary artery bypass graft)  2016-12: CAD (coronary artery disease)  2016-11: Chest  pain  2016-10: Situational anxiety  2014-12: Diabetes mellitus type II, uncontrolled (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)  2012-04: Hyperlipidemia  2012-04: Overweight  2012-04: Old myocardial infarction      Past Surgical History:  Past Surgical History:   Procedure Laterality Date   • MULTIPLE CORONARY ARTERY BYPASS ENDO VEIN HARVEST  12/6/2016    Procedure: MULTIPLE CORONARY ARTERY BYPASS ENDO VEIN HARVEST X3 WITH LAITH;  Surgeon: Juan Pablo Schmitz M.D.;  Location: SURGERY [facility];  Service:    • SHOULDER ARTHROSCOPY W/ ROTATOR CUFF REPAIR  4/2/2013    Performed by Angelique Romero M.D. at [facility]   • SHOULDER DECOMPRESSION ARTHROSCOPIC  4/2/2013    Performed by Angelique Romero M.D. at [facility]   • JOINT INJECTION DIAGNOSTIC  4/2/2013    Performed by Angelique Romero M.D. at [facility]   • CLAVICLE DISTAL EXCISION  4/2/2013    Performed by Angelique Romero M.D. at [facility]   • SHOULDER ARTHROSCOPY W/ BICIPITAL TENODESIS REPAIR  4/2/2013    Performed by Angelique Romero M.D. at [facility]   • STENT PLACEMENT  2008    x3   • DENTAL EXTRACTION(S)  1997    wisdom teeth   • KNEE ARTHROSCOPY  1983    left   • ORIF, PATELLA  1980    bilateral   • ZZZ CARDIAC CATH          Allergies:  Metoprolol     Social History:  Social History     Tobacco Use   • Smoking status: Never Smoker   • Smokeless tobacco: Never Used   Vaping Use   • Vaping Use: Never used   Substance Use Topics   • Alcohol use: Yes     Alcohol/week: 3.0 oz     Types: 6 Cans of beer per week     Comment: 2 per week   • Drug use: No  "    Comment: Former Marijuana usage        Family History:   family history is not on file. He was adopted.      PHYSICAL EXAM:   OBJECTIVE:  Vital signs: /64   Pulse 84   Ht 1.803 m (5' 11\")   Wt 86.2 kg (190 lb)   SpO2 98%   BMI 26.50 kg/m²   GENERAL: Well-developed, well-nourished in no apparent distress.   EYE:  No ocular asymmetry, PERRLA  HENT: Pink, moist mucous membranes.    NECK: No thyromegaly.   CARDIOVASCULAR:  No murmurs  LUNGS: Clear breath sounds  ABDOMEN: Soft, nontender   EXTREMITIES: No clubbing, cyanosis, or edema.   NEUROLOGICAL: No gross focal motor abnormalities   LYMPH: No cervical adenopathy palpated  SKIN: No rashes, lesions.       Labs:  Lab Results   Component Value Date/Time    HBA1C 6.7 (A) 02/18/2022 04:47 PM        Lab Results   Component Value Date/Time    WBC 5.5 03/13/2020 04:43 AM    WBC 6.9 04/02/2019 07:14 AM    RBC 5.27 03/13/2020 04:43 AM    RBC 5.43 04/02/2019 07:14 AM    HEMOGLOBIN 17.1 03/13/2020 04:43 AM    HEMOGLOBIN 17.3 04/02/2019 07:14 AM    MCV 92 03/13/2020 04:43 AM    MCV 94.8 04/02/2019 07:14 AM    MCH 32.4 03/13/2020 04:43 AM    MCH 31.9 04/02/2019 07:14 AM    MCHC 35.3 03/13/2020 04:43 AM    MCHC 33.6 (L) 04/02/2019 07:14 AM    RDW 12.6 03/13/2020 04:43 AM    RDW 42.3 04/02/2019 07:14 AM    MPV 9.5 04/02/2019 07:14 AM       Lab Results   Component Value Date/Time    SODIUM 140 02/07/2022 05:21 AM    SODIUM 139 04/02/2019 07:14 AM    POTASSIUM 4.7 02/07/2022 05:21 AM    POTASSIUM 4.4 04/02/2019 07:14 AM    CHLORIDE 103 02/07/2022 05:21 AM    CHLORIDE 102 04/02/2019 07:14 AM    CO2 25 02/07/2022 05:21 AM    CO2 28 04/02/2019 07:14 AM    ANION 9.0 04/02/2019 07:14 AM    GLUCOSE 170 (H) 02/07/2022 05:21 AM    GLUCOSE 190 (H) 04/02/2019 07:14 AM    BUN 17 02/07/2022 05:21 AM    BUN 15 04/02/2019 07:14 AM    CREATININE 1.00 02/07/2022 05:21 AM    CREATININE 0.97 04/02/2019 07:14 AM    CALCIUM 9.4 02/07/2022 05:21 AM    CALCIUM 9.5 04/02/2019 07:14 AM    " ASTSGOT 13 02/07/2022 05:21 AM    ASTSGOT 19 04/02/2019 07:14 AM    ALTSGPT 17 02/07/2022 05:21 AM    ALTSGPT 28 04/02/2019 07:14 AM    TBILIRUBIN 0.4 02/07/2022 05:21 AM    TBILIRUBIN 0.6 04/02/2019 07:14 AM    ALBUMIN 4.6 02/07/2022 05:21 AM    ALBUMIN 4.4 04/02/2019 07:14 AM    TOTPROTEIN 6.9 02/07/2022 05:21 AM    TOTPROTEIN 6.3 04/02/2019 07:14 AM    GLOBULIN 2.3 02/07/2022 05:21 AM    GLOBULIN 1.9 04/02/2019 07:14 AM    AGRATIO 2.0 02/07/2022 05:21 AM    AGRATIO 2.3 04/02/2019 07:14 AM       Lab Results   Component Value Date/Time    CHOLSTRLTOT 142 11/04/2020 0431    TRIGLYCERIDE 57 11/04/2020 0431    HDL 55 11/04/2020 0431    LDL 87 03/13/2020 0443       Lab Results   Component Value Date/Time    MALBCRT see below 04/02/2019 07:14 AM    MICROALBUR <0.7 04/02/2019 07:14 AM    MICRALB <3.0 02/07/2022 05:21 AM        Lab Results   Component Value Date/Time    TSHULTRASEN 0.990 04/02/2019 0714     No results found for: FREEDIR  No results found for: FREET3  No results found for: THYSTIMIG        ASSESSMENT/PLAN:     1. Controlled type 2 diabetes mellitus without complication, without long-term current use of insulin (HCC)  Controlled  a1c was 6.7%  Increase Trulicity to 3.0mg weekly  Continue Xigduo XR  Continue Actos  Repeat serum creatinine fasting lipids in 6 months  I will see him again in 6 months with repeat of his A1c    2. Dyslipidemia associated with type 2 diabetes mellitus (HCC)  Unstable  Recommend better compliance with Crestor  Repeat fasting lipids in 6 months to assess compliance    3. Long term (current) use of oral hypoglycemic drugs  Patient is oral agents for t2dm      Return in about 6 months (around 8/18/2022).      Thank you kindly for allowing me to participate in the diabetes care plan for this patient.    Tone Moreno MD, FACE, [facility]  11/24/20    CC:   Jen Ortiz P.A.-C.

## 2022-02-19 NOTE — PROGRESS NOTES
"RN-CDE Note    Subjective:   Endocrinology Clinic Progress Note  PCP: Jen Ortiz P.A.-C.    HPI:  Hu Santacruz is a 61 y.o. old patient who is seen today for review of Type 2 Diabetes.  Recent changes in health: Feeling tired but healthy.  DM:   Last A1c:   Lab Results   Component Value Date/Time    HBA1C 6.7 (A) 02/18/2022 04:47 PM      Previous A1c was 6.5 on 10/26/21  A1C GOAL: < 7    Diabetes Medications:   Actos 30 mg daily  Trulicity 1.5 mg weekly  Xigduo XR 5-1000 mg BID    Exercise: active remodeling house  Diet: \"healthy\" diet  in general  Patient's body mass index is 26.5 kg/m². Exercise and nutrition counseling were performed at this visit.    Glucose monitoring frequency: Not testing    Hypoglycemic episodes: no  Last Retinal Exam: on file and up-to-date  Daily Foot Exam: Yes   Foot Exam:  Monofilament: done  Monofilament testing with a 10 gram force: sensation intact: intact bilaterally  Visual Inspection: Feet without maceration, ulcers, fissures.  Pedal pulses: intact bilaterally   Lab Results   Component Value Date/Time    MALBCRT see below 04/02/2019 07:14 AM    MICROALBUR <0.7 04/02/2019 07:14 AM    MICRALB <3.0 02/07/2022 05:21 AM     He  reports that he has never smoked. He has never used smokeless tobacco.      Plan:     Discussed and educated on:   - All medications, side effects and compliance (discussed carefully)  - Annual eye examinations at Ophthalmology  - Home glucose monitoring emphasized  - Weight control and daily exercise    Recommended medication changes: No changes at this time. He will watch his regular soda intake.  "

## 2022-04-26 ENCOUNTER — OFFICE VISIT (OUTPATIENT)
Dept: MEDICAL GROUP | Age: 62
End: 2022-04-26
Payer: COMMERCIAL

## 2022-04-26 VITALS
HEIGHT: 71 IN | BODY MASS INDEX: 26.18 KG/M2 | DIASTOLIC BLOOD PRESSURE: 70 MMHG | SYSTOLIC BLOOD PRESSURE: 110 MMHG | OXYGEN SATURATION: 98 % | WEIGHT: 187 LBS | TEMPERATURE: 98.1 F | HEART RATE: 68 BPM

## 2022-04-26 DIAGNOSIS — Z95.1 S/P CABG (CORONARY ARTERY BYPASS GRAFT): ICD-10-CM

## 2022-04-26 DIAGNOSIS — E11.9 CONTROLLED TYPE 2 DIABETES MELLITUS WITHOUT COMPLICATION, WITHOUT LONG-TERM CURRENT USE OF INSULIN (HCC): ICD-10-CM

## 2022-04-26 DIAGNOSIS — F41.8 SITUATIONAL ANXIETY: ICD-10-CM

## 2022-04-26 DIAGNOSIS — R22.1 MASS IN NECK: ICD-10-CM

## 2022-04-26 DIAGNOSIS — I25.10 CORONARY ARTERY DISEASE INVOLVING NATIVE CORONARY ARTERY OF NATIVE HEART WITHOUT ANGINA PECTORIS: ICD-10-CM

## 2022-04-26 PROCEDURE — 99214 OFFICE O/P EST MOD 30 MIN: CPT | Performed by: PHYSICIAN ASSISTANT

## 2022-04-26 RX ORDER — ALPRAZOLAM 0.25 MG/1
0.25 TABLET ORAL NIGHTLY PRN
Qty: 20 TABLET | Refills: 0 | Status: SHIPPED | OUTPATIENT
Start: 2022-04-26 | End: 2022-05-16

## 2022-04-26 NOTE — PROGRESS NOTES
Subjective:     Chief Complaint   Patient presents with   • Bump     On neck x 2 years   • Referral Needed     Derm, and Endo   • Stress   • Medication Refill     xanax     Hu Santacruz is a 62 y.o. male here today for evaluation and management of:    Mass in neck  Lipoma now painful. Derm indicated that they might be able to remove it He thinks he needs referral.     Controlled type 2 diabetes mellitus without complication, without long-term current use of insulin (HCC)  Stable. Taking meds as prescribed. Tolerating well. No side effects. Under care of endo would like to continue care with them. He needs updated referral for more visits.     Situational anxiety  Reports mother in law's health is declining. Occasionally has a hard time managing stress and results in panic attacks. [facility] has had small supply of xanax over the years and helps. Last fill 2020.   Any adverse effects: No    Alcohol or illicit drug use:   He  reports current alcohol use of about 3.0 oz of alcohol per week.  He  reports no history of drug use.     History of controlled substance used in a way other than prescribed? No  Any early refills of a controlled substance: No  History of lost or stolen controlled substance prescription: No  Any aberrant behavior or intoxication while on a controlled substance: No  Has the patient self-modified their dose or frequency of the medication :No  Compliant with treatment recommendations and plan: Yes  Any major health change to the patient: No  Concerns for misuse, abuse or addiction: No    /NarxCheck report reviewed: Yes  History of abnormal drug screening: N/A     Coronary artery disease involving native coronary artery of native heart without angina pectoris/S/P CABG (coronary artery bypass graft)  Last two cardiologist have left org. Was supposed to get echo and stress test, but failed to followup. He is not on betablocker--didn't like how metoprolol made him feel so cardiology added as  "allergy.   He is taking crestor and aspirin daily.    Current medicines (including changes today)  Current Outpatient Medications   Medication Sig Dispense Refill   • ALPRAZolam (XANAX) 0.25 MG Tab Take 1 Tablet by mouth at bedtime as needed for Anxiety for up to 20 days. 20 Tablet 0   • Dulaglutide (TRULICITY) 3 MG/0.5ML Solution Pen-injector Inject 0.5 mL under the skin every 7 days. 6 mL 3   • XIGDUO XR 5-1000 MG TABLET SR 24 HR Take 1 Tablet by mouth 2 times a day. 180 Tablet 3   • pioglitazone (ACTOS) 30 MG Tab Take 1 Tablet by mouth every day. 90 Tablet 3   • sildenafil citrate (VIAGRA) 100 MG tablet TAKE ONE TABLET BY MOUTH DAILY AS NEEDED FOR ERECTILE DYSFUNCTION (VIAGRA) 30 Tablet 2   • rosuvastatin (CRESTOR) 20 MG Tab TAKE 1 TABLET BY MOUTH ONCE DAILY AT BEDTIME 90 tablet 3   • aspirin EC (ECOTRIN) 81 MG Tablet Delayed Response Take 81 mg by mouth every day.     • vitamin D (CHOLECALCIFEROL) 1000 UNIT TABS Take 1,000 Units by mouth 2 times a day.         No current facility-administered medications for this visit.        Objective:     Vitals:    04/26/22 0914   BP: 110/70   BP Location: Left arm   Patient Position: Sitting   BP Cuff Size: Adult   Pulse: 68   Temp: 36.7 °C (98.1 °F)   TempSrc: Temporal   SpO2: 98%   Weight: 84.8 kg (187 lb)   Height: 1.803 m (5' 11\")     Body mass index is 26.08 kg/m².   Physical Exam:  Constitutional: Alert, no distress, well-groomed.  Skin: Warm, dry, good turgor, no rashes in visible areas.  Eye: Equal, round and reactive, conjunctiva clear, lids normal.  Neck: Trachea midline. Posterior neck mass, tender to palpation.   Cardiovascular: Regular rate and rhythm.  Chest: Effort normal. Clear to auscultation throughout. No adventitious sounds.   Abdomen: Soft, no gross masses.  MSK: Normal gait, moves all extremities.  Neuro: Grossly non-focal. No cranial nerve deficit. Strength and sensation intact.   Psych: Alert and oriented x3, normal affect and mood.    Lab Results "   Component Value Date/Time    HBA1C 6.7 (A) 02/18/2022 04:47 PM    HBA1C 6.5 (A) 10/26/2021 07:30 AM    HBA1C 6.4 (H) 11/04/2020 04:31 AM    HBA1C 6.4 (H) 03/13/2020 04:43 AM    HBA1C 6.6 (A) 09/10/2019 08:22 AM         Assessment and Plan:   The following treatment plan was discussed:     1. Mass in neck  Chronic, worsening. Will send to dermatology for excision.   - Referral to Dermatology    2. Controlled type 2 diabetes mellitus without complication, without long-term current use of insulin (HCC)  Chronic, stable. Continue current medicines. Monitor labs regularly.  - Referral to Endocrinology    3. Situational anxiety  Chronic, stable. PDMP was reviewed. Patient was counseled on risk of cognitive impairment and effects on driving and safety while taking controlled substance. To be use sparingly and not daily.  - ALPRAZolam (XANAX) 0.25 MG Tab; Take 1 Tablet by mouth at bedtime as needed for Anxiety for up to 20 days.  Dispense: 20 Tablet; Refill: 0    4. Coronary artery disease involving native coronary artery of native heart without angina pectoris  5. S/P CABG (coronary artery bypass graft)  Chronic, stable. Continue current medicines. Monitor labs regularly. Overdue for follow-up with specialist as directed per last note. Needs echo and stress test. Will work on getting him in with new referral.  - REFERRAL TO CARDIOLOGY      Health Maintenance: Shingrix unavailable in the clinic. To be administered at pharmacy.     Followup: Return in about 6 months (around 10/26/2022) for appointment to establish care with new PCP.

## 2022-07-07 ENCOUNTER — OFFICE VISIT (OUTPATIENT)
Dept: CARDIOLOGY | Facility: MEDICAL CENTER | Age: 62
End: 2022-07-07
Payer: COMMERCIAL

## 2022-07-07 VITALS
SYSTOLIC BLOOD PRESSURE: 116 MMHG | RESPIRATION RATE: 16 BRPM | BODY MASS INDEX: 26.6 KG/M2 | HEIGHT: 71 IN | OXYGEN SATURATION: 98 % | HEART RATE: 78 BPM | WEIGHT: 190 LBS | DIASTOLIC BLOOD PRESSURE: 72 MMHG

## 2022-07-07 DIAGNOSIS — E11.9 CONTROLLED TYPE 2 DIABETES MELLITUS WITHOUT COMPLICATION, WITHOUT LONG-TERM CURRENT USE OF INSULIN (HCC): ICD-10-CM

## 2022-07-07 DIAGNOSIS — I25.10 CORONARY ARTERY DISEASE INVOLVING NATIVE CORONARY ARTERY OF NATIVE HEART WITHOUT ANGINA PECTORIS: ICD-10-CM

## 2022-07-07 DIAGNOSIS — Z95.1 S/P CABG (CORONARY ARTERY BYPASS GRAFT): ICD-10-CM

## 2022-07-07 DIAGNOSIS — E78.5 DYSLIPIDEMIA: ICD-10-CM

## 2022-07-07 PROCEDURE — 99214 OFFICE O/P EST MOD 30 MIN: CPT | Performed by: INTERNAL MEDICINE

## 2022-07-07 PROCEDURE — 93000 ELECTROCARDIOGRAM COMPLETE: CPT | Performed by: INTERNAL MEDICINE

## 2022-07-07 NOTE — PROGRESS NOTES
Cardiology Follow-up Consultation Note    Date of note:    7/7/2022    Primary Care Provider: Jen Ortiz P.A.-C.    Name:             Hu Santacruz   YOB: 1960  MRN:               [id number]    Chief Complaint   Patient presents with   • Coronary Artery Disease     F/V Dx: CAD (coronary artery disease), native coronary artery   • Coronary Artery Bypass Graft (CABG)     F/V Dx: S/P CABG (coronary artery bypass graft)     • Dyslipidemia     F/V Dx: Dyslipidemia associated with type 2 diabetes mellitus (HCC)         HISTORY OF PRESENT ILLNESS  Mr. Hu Santacruz is a 62 y.o. male who returns to see us for follow-up of CAD and CABG    Pertinent history:  CAD with unsuccessful PCI  S/p CABG x3 (LIMA-LAD, reverse SVG-D2 and PDA)  Type 2 diabetes mellitus  Dyslipidemia    Last clinic visit: 10/1/2020    Interim History:  Since his last visit, he has been doing well from a cardiovascular perspective.  Ran out of his medications for a while and recently restarted them.  A1c went up as well.  His PCP recently increased Trulicity.    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 Does not have a set exercise routine.      Past Medical History:   Diagnosis Date   • Adjustment disorder 4/16/2012   • Back pain    • CAD (coronary artery disease), native coronary artery 4/16/2012    CABG X3 (LIMA-LAD, SVG-2nd diag and R-PDA)-12/2016   • Cold 2/21/13   • DM (diabetes mellitus) ([facility]) 2008    oral agent   • Hyperlipidemia 4/16/2012   • Muscle disorder    • Old myocardial infarction 2008    cardiologist; Dr. Olmstead   • Pain     right shoulder   • Situational anxiety 10/11/2016   • Snoring          Past Surgical History:   Procedure Laterality Date   • MULTIPLE CORONARY ARTERY BYPASS ENDO VEIN HARVEST  12/6/2016    Procedure: MULTIPLE CORONARY ARTERY BYPASS ENDO VEIN HARVEST X3 WITH LAITH;  Surgeon: Juan Pablo Schmitz M.D.;  Location: SURGERY [facility];  Service:    • SHOULDER ARTHROSCOPY W/ ROTATOR  CUFF REPAIR  4/2/2013    Performed by Angelique Romero M.D. at SURGERY [facility]   • SHOULDER DECOMPRESSION ARTHROSCOPIC  4/2/2013    Performed by Angelique Romero M.D. at [facility]   • JOINT INJECTION DIAGNOSTIC  4/2/2013    Performed by Angelique Romero M.D. at [facility]   • CLAVICLE DISTAL EXCISION  4/2/2013    Performed by Angelique Romero M.D. at [facility]   • SHOULDER ARTHROSCOPY W/ BICIPITAL TENODESIS REPAIR  4/2/2013    Performed by Angelique Romero M.D. at [facility]   • STENT PLACEMENT  2008    x3   • DENTAL EXTRACTION(S)  1997    wisdom teeth   • KNEE ARTHROSCOPY  1983    left   • ORIF, PATELLA  1980    bilateral   • ZZZ CARDIAC CATH           Current Outpatient Medications   Medication Sig Dispense Refill   • Dulaglutide (TRULICITY) 3 MG/0.5ML Solution Pen-injector Inject 0.5 mL under the skin every 7 days. 6 mL 3   • XIGDUO XR 5-1000 MG TABLET SR 24 HR Take 1 Tablet by mouth 2 times a day. 180 Tablet 3   • pioglitazone (ACTOS) 30 MG Tab Take 1 Tablet by mouth every day. 90 Tablet 3   • sildenafil citrate (VIAGRA) 100 MG tablet TAKE ONE TABLET BY MOUTH DAILY AS NEEDED FOR ERECTILE DYSFUNCTION (VIAGRA) 30 Tablet 2   • rosuvastatin (CRESTOR) 20 MG Tab TAKE 1 TABLET BY MOUTH ONCE DAILY AT BEDTIME (Patient taking differently: Take 20 mg by mouth every day.) 90 tablet 3   • aspirin EC (ECOTRIN) 81 MG Tablet Delayed Response Take 81 mg by mouth every day.     • vitamin D (CHOLECALCIFEROL) 1000 UNIT TABS Take 1,000 Units by mouth 2 times a day.         No current facility-administered medications for this visit.         Allergies   Allergen Reactions   • Metoprolol          Family History   Adopted: Yes   Problem Relation Age of Onset   • Heart Attack Neg Hx    • Heart Disease Neg Hx          Social History     Socioeconomic History   • Marital status:      Spouse name: Not on file   • Number of children: 1   • Years of  "education: Not on file   • Highest education level: Not on file   Occupational History   • Occupation: Contractor     Employer: OTHER   Tobacco Use   • Smoking status: Never Smoker   • Smokeless tobacco: Never Used   Vaping Use   • Vaping Use: Never used   Substance and Sexual Activity   • Alcohol use: Yes     Alcohol/week: 3.0 oz     Types: 6 Cans of beer per week     Comment: 2 per week   • Drug use: No     Comment: Former Marijuana usage   • Sexual activity: Yes     Partners: Female   Other Topics Concern   • Not on file   Social History Narrative   • Not on file     Social Determinants of Health     Financial Resource Strain: Not on file   Food Insecurity: Not on file   Transportation Needs: Not on file   Physical Activity: Not on file   Stress: Not on file   Social Connections: Not on file   Intimate Partner Violence: Not on file   Housing Stability: Not on file         Physical Exam:  Ambulatory Vitals  /72 (BP Location: Left arm, Patient Position: Sitting, BP Cuff Size: Adult)   Pulse 78   Resp 16   Ht 1.803 m (5' 11\")   Wt 86.2 kg (190 lb)   SpO2 98%    Oxygen Therapy:  Pulse Oximetry: 98 %  BP Readings from Last 4 Encounters:   07/07/22 116/72   04/26/22 110/70   02/18/22 108/64   10/26/21 122/60       Weight/BMI: Body mass index is 26.5 kg/m².  Wt Readings from Last 4 Encounters:   07/07/22 86.2 kg (190 lb)   04/26/22 84.8 kg (187 lb)   02/18/22 86.2 kg (190 lb)   10/26/21 86.3 kg (190 lb 3.2 oz)       GEN: Well developed, well nourished and in no acute distress.  HEART: no significant JVD, regular rate and rhythm, normal S1 and S2, no murmurs, no third heart sounds, normal cardiac palpation  LUNG: clear to auscultation bilaterally, no wheezing, no crackles, normal respiratory effort on room air  ABDOMEN: soft, non-tender, non-distended, normal bowel sounds throughout  EXTREMITIES: no peripheral edema noted  VASCULAR: no significantly elevated jugular venous pressure, no carotid bruits noted, " radial pulses 2+ and equal      Lab Data Review:  Lab Results   Component Value Date/Time    CHOLSTRLTOT 172 02/07/2022 05:21 AM    CHOLSTRLTOT 161 04/02/2019 07:14 AM    LDL 87 03/13/2020 04:43 AM    LDL 84 04/02/2019 07:14 AM    HDL 47 02/07/2022 05:21 AM    HDL 45 04/02/2019 07:14 AM    TRIGLYCERIDE 106 02/07/2022 05:21 AM    TRIGLYCERIDE 158 (H) 04/02/2019 07:14 AM       Lab Results   Component Value Date/Time    SODIUM 140 02/07/2022 05:21 AM    SODIUM 139 04/02/2019 07:14 AM    POTASSIUM 4.7 02/07/2022 05:21 AM    POTASSIUM 4.4 04/02/2019 07:14 AM    CHLORIDE 103 02/07/2022 05:21 AM    CHLORIDE 102 04/02/2019 07:14 AM    CO2 25 02/07/2022 05:21 AM    CO2 28 04/02/2019 07:14 AM    GLUCOSE 170 (H) 02/07/2022 05:21 AM    GLUCOSE 190 (H) 04/02/2019 07:14 AM    BUN 17 02/07/2022 05:21 AM    BUN 15 04/02/2019 07:14 AM    CREATININE 1.00 02/07/2022 05:21 AM    CREATININE 0.97 04/02/2019 07:14 AM    BUNCREATRAT 17 02/07/2022 05:21 AM     Lab Results   Component Value Date/Time    ALKPHOSPHAT 66 02/07/2022 05:21 AM    ALKPHOSPHAT 59 04/02/2019 07:14 AM    ASTSGOT 13 02/07/2022 05:21 AM    ASTSGOT 19 04/02/2019 07:14 AM    ALTSGPT 17 02/07/2022 05:21 AM    ALTSGPT 28 04/02/2019 07:14 AM    TBILIRUBIN 0.4 02/07/2022 05:21 AM    TBILIRUBIN 0.6 04/02/2019 07:14 AM      Lab Results   Component Value Date/Time    WBC 5.5 03/13/2020 04:43 AM    WBC 6.9 04/02/2019 07:14 AM     Lab Results   Component Value Date/Time    HBA1C 6.7 (A) 02/18/2022 04:47 PM    HBA1C 6.5 (A) 10/26/2021 07:30 AM       Cardiac Imaging and Procedures Review:    EKG dated 7/7/2022: My personal interpretation is sinus rhythm    Echo dated 10/21/2016:   CONCLUSIONS  Normal left ventricular size and systolic function.  Mild concentric left ventricular hypertrophy.  Grade I diastolic dysfunction.  Left ventricular ejection fraction is visually estimated to be 65%.  Normal left atrial size.  Structurally normal aortic valve without significant stenosis or    regurgitation.  Mitral annular calcification.  No mitral regurgitation.  Trace pulmonic insufficiency.  Structurally normal tricuspid valve.    [facility] (11/18/2016):   POSTOPERATIVE DIAGNOSES:  1.  Two-vessel coronary artery disease with calcified 95% proximal left   anterior descending artery starting around the edge of previously placed   stent along with 50%-60% mid left anterior descending artery stenosis and   95% in-stent mid right coronary artery stenosis.  2.  Status post unsuccessful attempt intervention of the left anterior   descending artery due to a nondilatable calcified stenosis.  3.  Normal left ventricular systolic function, ejection fraction of 56%.  4.  History of anxiety.  5.  Diabetes mellitus.        Assessment & Plan     1. Coronary artery disease involving native coronary artery of native heart without angina pectoris  EKG    EC-ECHOCARDIOGRAM COMPLETE W/O CONT    NM-CARDIAC STRESS TEST   2. S/P CABG (coronary artery bypass graft)  EC-ECHOCARDIOGRAM COMPLETE W/O CONT    NM-CARDIAC STRESS TEST   3. Dyslipidemia  Lipid Profile   4. Controlled type 2 diabetes mellitus without complication, without long-term current use of insulin (Formerly [facility])  HEMOGLOBIN A1C (Glycohemoglobin GHB Total/A1C with MBG Estimate)       Doing well from a cardiovascular perspective.  Has not had an echocardiogram or nuclear stress test as since his CABG surgery.  Aforementioned tests were ordered during our last visit but patient did not complete them.  After discussion, he is amenable to proceed with echocardiogram and nuclear cardiac stress test given history of CAD and CABG.    Lipid panel reviewed from February which shows LDL above goal.  Was not taking Crestor for a while.  Obtain repeat lipid panel and if LDL remains above 70, will increase Crestor to 40 mg daily.    Obtain updated hemoglobin A1c given recent change in medication.      All of patient's excellent questions were answered to the best of my knowledge and to  his satisfaction.  It was a pleasure seeing Mr. Hu Santacruz in my clinic today. Return in about 1 year (around 7/7/2023).  Or earlier if abnormal test results.  Patient is aware to call the cardiology clinic with any questions or concerns.      Kishor Faye MD  [facility]  [facility], [address]  [address] [address]  Phone: [phone]  Fax: [phone]    Please note that this dictation was created using voice recognition software. I have made every reasonable attempt to correct obvious errors, but it is possible there are errors of grammar and possibly content that I did not discover before finalizing the note.

## 2022-07-08 LAB — EKG IMPRESSION: NORMAL

## 2022-07-09 LAB
CHOLEST SERPL-MCNC: 138 MG/DL (ref 100–199)
HBA1C MFR BLD: 6.5 % (ref 4.8–5.6)
HDLC SERPL-MCNC: 48 MG/DL
LABORATORY COMMENT REPORT: NORMAL
LDLC SERPL CALC-MCNC: 75 MG/DL (ref 0–99)
TRIGL SERPL-MCNC: 76 MG/DL (ref 0–149)
VLDLC SERPL CALC-MCNC: 15 MG/DL (ref 5–40)

## 2022-08-23 ENCOUNTER — OFFICE VISIT (OUTPATIENT)
Dept: ENDOCRINOLOGY | Facility: MEDICAL CENTER | Age: 62
End: 2022-08-23
Attending: INTERNAL MEDICINE
Payer: COMMERCIAL

## 2022-08-23 VITALS
SYSTOLIC BLOOD PRESSURE: 122 MMHG | HEIGHT: 71 IN | OXYGEN SATURATION: 98 % | DIASTOLIC BLOOD PRESSURE: 70 MMHG | WEIGHT: 195.5 LBS | BODY MASS INDEX: 27.37 KG/M2 | HEART RATE: 78 BPM

## 2022-08-23 DIAGNOSIS — E78.5 DYSLIPIDEMIA: ICD-10-CM

## 2022-08-23 DIAGNOSIS — E11.9 CONTROLLED TYPE 2 DIABETES MELLITUS WITHOUT COMPLICATION, WITHOUT LONG-TERM CURRENT USE OF INSULIN (HCC): ICD-10-CM

## 2022-08-23 DIAGNOSIS — Z79.84 LONG TERM (CURRENT) USE OF ORAL HYPOGLYCEMIC DRUGS: ICD-10-CM

## 2022-08-23 DIAGNOSIS — N52.9 ERECTILE DYSFUNCTION, UNSPECIFIED ERECTILE DYSFUNCTION TYPE: ICD-10-CM

## 2022-08-23 PROCEDURE — 99211 OFF/OP EST MAY X REQ PHY/QHP: CPT | Performed by: INTERNAL MEDICINE

## 2022-08-23 PROCEDURE — 99212 OFFICE O/P EST SF 10 MIN: CPT | Performed by: INTERNAL MEDICINE

## 2022-08-23 PROCEDURE — 92250 FUNDUS PHOTOGRAPHY W/I&R: CPT | Performed by: INTERNAL MEDICINE

## 2022-08-23 PROCEDURE — 99214 OFFICE O/P EST MOD 30 MIN: CPT | Performed by: INTERNAL MEDICINE

## 2022-08-23 RX ORDER — SILDENAFIL 100 MG/1
TABLET, FILM COATED ORAL
Qty: 30 TABLET | Refills: 2 | Status: SHIPPED | OUTPATIENT
Start: 2022-08-23 | End: 2023-02-27

## 2022-08-23 NOTE — PROGRESS NOTES
CHIEF COMPLAINT: Patient is here for follow up of Type 2 Diabetes Mellitus.     HPI:     Hu Santacruz is a 62 y.o. male with Type 2 Diabetes Mellitus here for follow up.    Labs from 7/7/2022 show a1c is 6.5%  Labs from 2/18/2022 show a1c was 6.7%  Labs from 10/26/2021 show a1c was 6.5%  Labs from 11/4/2020 show HbA1c was 6.4%        On follow up he is taking   Xigduo XR 5/1000mg twice a day,   Pioglitazone 30mg daily, and   Trulicity 3.0mg weekly      He denies SE with his meds even with a higher dose of Trulicity  He thinks that his weight went up  He is requesting another refill of Viagra  He denies usage of nitrates while on sildenafil  He denies any side effects such as chest pain, dizziness, lightheadedness and blue vision  I noted that his testosterone levels have not been checked recently      He has hyperlipidemia and CAD  He is taking generic crestor  He reports better compliance  His LDL-C  improved from 106 to 75 on 7/8/2022      He doesn't have diabetic kidney disease  UACR was < 30 on 2/18/2022      He doesn't have diabetic retinopathy based on his eye exam   10/26/2021  He does not have an eye exam scheduled this year so we discussed getting an eye exam in the office today        He denies foot sores  He denies numbness and pain and tingling in his feet        BG Diary:  patient did not bring meter    Weight has been stable    Diabetes Complications   Retinopathy: No known retinopathy.  Last eye exam: 10/26/2021  We are getting an eye exam today    Neuropathy: Denies paresthesias or numbness in hands or feet. Denies any foot wounds.  Exercise: Minimal.  Diet: Fair.  Patient's medications, allergies, and social histories were reviewed and updated as appropriate.    ROS:     CONS:     No fever, no chills   EYES:     No diplopia, no blurry vision   CV:           No chest pain, no palpitations   PULM:     No SOB, no cough, no hemoptysis.   GI:            No nausea, no vomiting, no diarrhea, no  constipation   ENDO:     No polyuria, no polydipsia, no heat intolerance, no cold intolerance       Past Medical History:  Problem List:  2021-10: Controlled type 2 diabetes mellitus without complication,   without long-term current use of insulin ([facility])  2020-11: Long term (current) use of oral hypoglycemic drugs  2017-02: Fatigue  2016-12: S/P CABG (coronary artery bypass graft)  2016-12: CAD (coronary artery disease)  2016-11: Chest pain  2016-10: Situational anxiety  2014-12: Diabetes mellitus type II, uncontrolled ([facility])  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  2013-04: Calcifying tendinitis of shoulder  2013-04: Bicipital tenosynovitis  2013-04: Rotator cuff (capsule) sprain  2012-04: Adjustment disorder  2012-04: CAD (coronary artery disease), native coronary artery  2012-04: S/P coronary artery stent placement  2012-04: Dyslipidemia associated with type 2 diabetes mellitus ([facility])  2012-04: Hyperlipidemia  2012-04: Overweight  2012-04: Old myocardial infarction    Past Surgical History:  Past Surgical History:   Procedure Laterality Date    MULTIPLE CORONARY ARTERY BYPASS ENDO VEIN HARVEST  12/6/2016    Procedure: MULTIPLE CORONARY ARTERY BYPASS ENDO VEIN HARVEST X3 WITH LAITH;  Surgeon: Juan Pablo Schmitz M.D.;  Location: SURGERY [facility];  Service:     SHOULDER ARTHROSCOPY W/ ROTATOR CUFF REPAIR  4/2/2013    Performed by Angelique Romero M.D. at [facility]    SHOULDER DECOMPRESSION ARTHROSCOPIC  4/2/2013    Performed by Angelique Romero M.D. at [facility]    JOINT INJECTION DIAGNOSTIC  4/2/2013    Performed by Angelique Romero M.D. at [facility]    CLAVICLE DISTAL EXCISION  4/2/2013    Performed by Angelique Romero M.D. at [facility]    SHOULDER ARTHROSCOPY W/ BICIPITAL TENODESIS REPAIR  4/2/2013    Performed by Angelique Romero M.D. at [facility]    STENT PLACEMENT  2008    x3    DENTAL EXTRACTION(S)  1997    wisdom teeth  "   KNEE ARTHROSCOPY  1983    left    ORIF, PATELLA  1980    bilateral    ZZZ CARDIAC CATH          Allergies:  Metoprolol     Social History:  Social History     Tobacco Use    Smoking status: Never    Smokeless tobacco: Never   Vaping Use    Vaping Use: Never used   Substance Use Topics    Alcohol use: Yes     Alcohol/week: 3.0 oz     Types: 6 Cans of beer per week     Comment: 2 per week    Drug use: No     Comment: Former Marijuana usage        Family History:   family history is not on file. He was adopted.      PHYSICAL EXAM:   OBJECTIVE:  Vital signs: /70 (BP Location: Left arm, Patient Position: Sitting, BP Cuff Size: Adult)   Pulse 78   Ht 1.803 m (5' 11\")   Wt 88.7 kg (195 lb 8 oz)   SpO2 98%   BMI 27.27 kg/m²   GENERAL: Well-developed, well-nourished in no apparent distress.   EYE:  No ocular asymmetry, PERRLA  HENT: Pink, moist mucous membranes.    NECK: No thyromegaly.   CARDIOVASCULAR:  No murmurs  LUNGS: Clear breath sounds  ABDOMEN: Soft, nontender   EXTREMITIES: No clubbing, cyanosis, or edema.   NEUROLOGICAL: No gross focal motor abnormalities   LYMPH: No cervical adenopathy palpated  SKIN: No rashes, lesions.       Labs:  Lab Results   Component Value Date/Time    HBA1C 6.5 (H) 07/08/2022 06:09 AM    HBA1C 6.7 (A) 02/18/2022 04:47 PM        Lab Results   Component Value Date/Time    WBC 5.5 03/13/2020 04:43 AM    WBC 6.9 04/02/2019 07:14 AM    RBC 5.27 03/13/2020 04:43 AM    RBC 5.43 04/02/2019 07:14 AM    HEMOGLOBIN 17.1 03/13/2020 04:43 AM    HEMOGLOBIN 17.3 04/02/2019 07:14 AM    MCV 92 03/13/2020 04:43 AM    MCV 94.8 04/02/2019 07:14 AM    MCH 32.4 03/13/2020 04:43 AM    MCH 31.9 04/02/2019 07:14 AM    MCHC 35.3 03/13/2020 04:43 AM    MCHC 33.6 (L) 04/02/2019 07:14 AM    RDW 12.6 03/13/2020 04:43 AM    RDW 42.3 04/02/2019 07:14 AM    MPV 9.5 04/02/2019 07:14 AM       Lab Results   Component Value Date/Time    SODIUM 140 02/07/2022 05:21 AM    SODIUM 139 04/02/2019 07:14 AM    " POTASSIUM 4.7 02/07/2022 05:21 AM    POTASSIUM 4.4 04/02/2019 07:14 AM    CHLORIDE 103 02/07/2022 05:21 AM    CHLORIDE 102 04/02/2019 07:14 AM    CO2 25 02/07/2022 05:21 AM    CO2 28 04/02/2019 07:14 AM    ANION 9.0 04/02/2019 07:14 AM    GLUCOSE 170 (H) 02/07/2022 05:21 AM    GLUCOSE 190 (H) 04/02/2019 07:14 AM    BUN 17 02/07/2022 05:21 AM    BUN 15 04/02/2019 07:14 AM    CREATININE 1.00 02/07/2022 05:21 AM    CREATININE 0.97 04/02/2019 07:14 AM    CALCIUM 9.4 02/07/2022 05:21 AM    CALCIUM 9.5 04/02/2019 07:14 AM    ASTSGOT 13 02/07/2022 05:21 AM    ASTSGOT 19 04/02/2019 07:14 AM    ALTSGPT 17 02/07/2022 05:21 AM    ALTSGPT 28 04/02/2019 07:14 AM    TBILIRUBIN 0.4 02/07/2022 05:21 AM    TBILIRUBIN 0.6 04/02/2019 07:14 AM    ALBUMIN 4.6 02/07/2022 05:21 AM    ALBUMIN 4.4 04/02/2019 07:14 AM    TOTPROTEIN 6.9 02/07/2022 05:21 AM    TOTPROTEIN 6.3 04/02/2019 07:14 AM    GLOBULIN 2.3 02/07/2022 05:21 AM    GLOBULIN 1.9 04/02/2019 07:14 AM    AGRATIO 2.0 02/07/2022 05:21 AM    AGRATIO 2.3 04/02/2019 07:14 AM       Lab Results   Component Value Date/Time    CHOLSTRLTOT 142 11/04/2020 0431    TRIGLYCERIDE 57 11/04/2020 0431    HDL 55 11/04/2020 0431    LDL 87 03/13/2020 0443       Lab Results   Component Value Date/Time    MALBCRT see below 04/02/2019 07:14 AM    MICROALBUR <0.7 04/02/2019 07:14 AM    MICRALB <3.0 02/07/2022 05:21 AM        Lab Results   Component Value Date/Time    TSHULTRASEN 0.990 04/02/2019 0714     No results found for: FREEDIR  No results found for: FREET3  No results found for: THYSTIMIG        ASSESSMENT/PLAN:     1. Controlled type 2 diabetes mellitus without complication, without long-term current use of insulin (HCC)  Controlled  a1c is stable at 6.5%  Continue Trulicity 3.0mg weekly  Continue Xigduo XR  Continue Actos  He is up-to-date with his labs  We are getting an eye exam today  Follow-up in 6 months with Catina    2. Dyslipidemia associated with type 2 diabetes mellitus (HCC)  Improved  control  Continue Crestor  Repeat fasting lipids in 6 months to assess compliance    3. Erectile dysfunction, unspecified erectile dysfunction type  Refilled Viagra  Because of the coexistence between ED and low testosterone  I will check his LH and FSH and total testosterone and SHBG levels with upcoming morning fasting labs    4. Long term (current) use of oral hypoglycemic drugs  Patient is oral agents for t2dm      Return in about 6 months (around 2/23/2023).      Thank you kindly for allowing me to participate in the diabetes care plan for this patient.    Tone Moreno MD, FACE, [facility]  11/24/20    CC:   Jen Ortiz P.A.-C.

## 2022-08-26 LAB — RETINAL SCREEN: POSITIVE

## 2022-10-12 ENCOUNTER — APPOINTMENT (OUTPATIENT)
Dept: RADIOLOGY | Facility: MEDICAL CENTER | Age: 62
End: 2022-10-12
Attending: INTERNAL MEDICINE
Payer: COMMERCIAL

## 2022-10-12 ENCOUNTER — APPOINTMENT (OUTPATIENT)
Dept: CARDIOLOGY | Facility: MEDICAL CENTER | Age: 62
End: 2022-10-12
Attending: INTERNAL MEDICINE
Payer: COMMERCIAL

## 2022-12-22 ENCOUNTER — OFFICE VISIT (OUTPATIENT)
Dept: URGENT CARE | Facility: CLINIC | Age: 62
End: 2022-12-22
Payer: COMMERCIAL

## 2022-12-22 VITALS
RESPIRATION RATE: 16 BRPM | WEIGHT: 190 LBS | BODY MASS INDEX: 25.73 KG/M2 | SYSTOLIC BLOOD PRESSURE: 128 MMHG | DIASTOLIC BLOOD PRESSURE: 64 MMHG | OXYGEN SATURATION: 97 % | HEART RATE: 84 BPM | HEIGHT: 72 IN | TEMPERATURE: 98.5 F

## 2022-12-22 DIAGNOSIS — R05.1 ACUTE COUGH: ICD-10-CM

## 2022-12-22 DIAGNOSIS — E11.9 CONTROLLED TYPE 2 DIABETES MELLITUS WITHOUT COMPLICATION, WITHOUT LONG-TERM CURRENT USE OF INSULIN (HCC): ICD-10-CM

## 2022-12-22 DIAGNOSIS — Z20.822 SUSPECTED COVID-19 VIRUS INFECTION: ICD-10-CM

## 2022-12-22 DIAGNOSIS — U07.1 COVID-19 VIRUS INFECTION: ICD-10-CM

## 2022-12-22 DIAGNOSIS — I25.10 CORONARY ARTERY DISEASE INVOLVING NATIVE CORONARY ARTERY OF NATIVE HEART WITHOUT ANGINA PECTORIS: Chronic | ICD-10-CM

## 2022-12-22 LAB
EXTERNAL QUALITY CONTROL: ABNORMAL
INT CON NEG: ABNORMAL
INT CON POS: ABNORMAL
SARS-COV+SARS-COV-2 AG RESP QL IA.RAPID: POSITIVE

## 2022-12-22 PROCEDURE — 87426 SARSCOV CORONAVIRUS AG IA: CPT | Performed by: NURSE PRACTITIONER

## 2022-12-22 PROCEDURE — 99214 OFFICE O/P EST MOD 30 MIN: CPT | Mod: CS | Performed by: NURSE PRACTITIONER

## 2022-12-22 RX ORDER — BENZONATATE 100 MG/1
100 CAPSULE ORAL 3 TIMES DAILY PRN
Qty: 60 CAPSULE | Refills: 0 | Status: SHIPPED | OUTPATIENT
Start: 2022-12-22 | End: 2023-02-13

## 2022-12-22 NOTE — PROGRESS NOTES
"Hu Santacruz is a 62 y.o. male who presents for Cough (X2 days, \"Headache, body aches. Positive Covid test this morning.Would like antiviral medications.\" )      HPI  This is a new problem. Hu Santacruz is a 62 y.o. patient who presents to urgent care with c/o: 2.5 days of illness. Started to feel 'achy' and started coughing. Did a home covid test that had a fine positive line.   Went to a [employer] party last week. ? Exposure   Treatments tried: Nyquil - didn't really help   Denies fever, sob, c/p.    No other aggravating or alleviating factors.       ROS See HPI    Allergies:       Allergies   Allergen Reactions    Metoprolol        PMSFS Hx:  Past Medical History:   Diagnosis Date    Adjustment disorder 4/16/2012    Back pain     CAD (coronary artery disease), native coronary artery 4/16/2012    CABG X3 (LIMA-LAD, SVG-2nd diag and R-PDA)-12/2016    Cold 2/21/13    DM (diabetes mellitus) ([facility] 2008    oral agent    Hyperlipidemia 4/16/2012    Muscle disorder     Old myocardial infarction 2008    cardiologist; Dr. Olmstead    Pain     right shoulder    Situational anxiety 10/11/2016    Snoring      Past Surgical History:   Procedure Laterality Date    MULTIPLE CORONARY ARTERY BYPASS ENDO VEIN HARVEST  12/6/2016    Procedure: MULTIPLE CORONARY ARTERY BYPASS ENDO VEIN HARVEST X3 WITH LAITH;  Surgeon: Juan Pablo Schmitz M.D.;  Location: SURGERY [facility];  Service:     SHOULDER ARTHROSCOPY W/ ROTATOR CUFF REPAIR  4/2/2013    Performed by Angelique Romero M.D. at [facility]    SHOULDER DECOMPRESSION ARTHROSCOPIC  4/2/2013    Performed by Angelique Romero M.D. at [facility]    JOINT INJECTION DIAGNOSTIC  4/2/2013    Performed by Angelique Romero M.D. at [facility]    CLAVICLE DISTAL EXCISION  4/2/2013    Performed by Angelique Romero M.D. at [facility]    SHOULDER ARTHROSCOPY W/ BICIPITAL TENODESIS REPAIR  4/2/2013    Performed by Angelique" RAMIN Romero at SURGERY [facility]    STENT PLACEMENT  2008    x3    DENTAL EXTRACTION(S)  1997    wisdom teeth    KNEE ARTHROSCOPY  1983    left    ORIF, PATELLA  1980    bilateral    ZZZ CARDIAC CATH       Family History   Adopted: Yes   Problem Relation Age of Onset    Heart Attack Neg Hx     Heart Disease Neg Hx      Social History     Tobacco Use    Smoking status: Never    Smokeless tobacco: Never   Substance Use Topics    Alcohol use: Yes     Alcohol/week: 3.0 oz     Types: 6 Cans of beer per week     Comment: 2 per week       Problems:   Patient Active Problem List   Diagnosis    CAD (coronary artery disease), native coronary artery    Dyslipidemia associated with type 2 diabetes mellitus (HCC)    Dyslipidemia    Calcifying tendinitis of shoulder    Bicipital tenosynovitis    Vasculogenic erectile dysfunction    S/P CABG (coronary artery bypass graft)    Long term (current) use of oral hypoglycemic drugs    Controlled type 2 diabetes mellitus without complication, without long-term current use of insulin ([facility])       Medications:   Current Outpatient Medications on File Prior to Visit   Medication Sig Dispense Refill    sildenafil citrate (VIAGRA) 100 MG tablet TAKE ONE TABLET BY MOUTH DAILY AS NEEDED FOR ERECTILE DYSFUNCTION (VIAGRA) 30 Tablet 2    rosuvastatin (CRESTOR) 20 MG Tab Take 1 Tablet by mouth at bedtime. 90 Tablet 3    Dulaglutide (TRULICITY) 3 MG/0.5ML Solution Pen-injector Inject 0.5 mL under the skin every 7 days. 6 mL 3    XIGDUO XR 5-1000 MG TABLET SR 24 HR Take 1 Tablet by mouth 2 times a day. 180 Tablet 3    pioglitazone (ACTOS) 30 MG Tab Take 1 Tablet by mouth every day. 90 Tablet 3    aspirin EC (ECOTRIN) 81 MG Tablet Delayed Response Take 81 mg by mouth every day.      vitamin D (CHOLECALCIFEROL) 1000 UNIT TABS Take 1,000 Units by mouth 2 times a day.         No current facility-administered medications on file prior to visit.          Objective:     /64 (BP Location:  Right arm, Patient Position: Sitting, BP Cuff Size: Adult)   Pulse 84   Temp 36.9 °C (98.5 °F) (Temporal)   Resp 16   Ht 1.829 m (6')   Wt 86.2 kg (190 lb)   SpO2 97%   BMI 25.77 kg/m²     Physical Exam  Nursing note reviewed.   Constitutional:       General: He is not in acute distress.     Appearance: Normal appearance. He is well-developed and well-groomed. He is not toxic-appearing.   HENT:      Head: Normocephalic and atraumatic.      Nose: Nose normal. No congestion or rhinorrhea.      Mouth/Throat:      Mouth: Mucous membranes are moist.      Pharynx: No posterior oropharyngeal erythema.   Eyes:      General:         Right eye: No discharge.         Left eye: No discharge.      Conjunctiva/sclera: Conjunctivae normal.   Cardiovascular:      Rate and Rhythm: Normal rate and regular rhythm.      Pulses: Normal pulses.      Heart sounds: Normal heart sounds.   Pulmonary:      Effort: Pulmonary effort is normal. No accessory muscle usage.      Breath sounds: Normal breath sounds and air entry.   Musculoskeletal:      Cervical back: Neck supple.   Lymphadenopathy:      Cervical: No cervical adenopathy.      Upper Body:      Right upper body: No supraclavicular adenopathy.      Left upper body: No supraclavicular adenopathy.   Skin:     General: Skin is warm and dry.      Capillary Refill: Capillary refill takes less than 2 seconds.   Neurological:      Mental Status: He is alert and oriented to person, place, and time.   Psychiatric:         Mood and Affect: Mood normal.         Behavior: Behavior normal.         Thought Content: Thought content normal.     Covid 19 +     Assessment /Associated Orders:      1. Suspected COVID-19 virus infection  POCT SARS-COV Antigen BRYON (Symptomatic only)      2. Coronary artery disease involving native coronary artery of native heart without angina pectoris        3. Controlled type 2 diabetes mellitus without complication, without long-term current use of insulin (HCC)         4. Acute cough  benzonatate (TESSALON) 100 MG Cap      5. COVID-19 virus infection  Nirmatrelvir&Ritonavir 300/100 20 x 150 MG & 10 x 100MG Tablet Therapy Pack          Medical Decision Making:    Pt is clinically stable at today's acute urgent care visit.  No acute distress noted. Appropriate for outpatient care at this time.   Acute problem today .   Hold rosuvastatin and sildenafil while taking paxlovi  +Mild to moderate covid sx  +Risk factors present for progression to serious covid symptoms:   + sx onset < 5 days  No known or suspected severe renal impairment ( eGFR  > 60  in Feb 2022)   No known or suspected severe liver impairement (labs in Feb 2022)   Denies hx hypersensitivity reactions.   Reviewed all medications patient is taking ( OTC/ RX). No contraindicated medications based on medication use disclosed by pt and on EHR.   Paxlovid ( nirmatrelvir and ritonavir) is an FDA emergency use authorization drug intended for COVID-positive patients at high risk for severe disease including hospital and death. This medication is not FDA approved. Unknown short or long term effects of medication. Some adverse effects can include but are not limited to: Hypersensitivity reaction, anaphylaxis, hepatotoxicity, hepatitis, jaundice, diarrhea and / or others up to and including death. The concomitant use of PAXLOVID and certain other drugs may result in potentially significant drug interactions that may be unknown at this time. Patient handout given to patient.   Resume all prior  RX medications. Take as prescribed.   Educated in proper administration of  prescription medication(s) ordered today including safety, possible SE, risks, benefits, rationale and alternatives to therapy.   Keep well hydrated  Quarantine per CDC guidelines     Discussed Dx, management options (risks,benefits, and alternatives to planned treatment), natural progression and supportive care.  Expressed understanding and the treatment plan  was agreed upon.   Questions were encouraged and answered   Return to urgent care prn if new or worsening sx or if there is no improvement in condition prn.    Educated in Red flags and indications to immediately call 911 or present to the Emergency Department.       Time I spent evaluating Hu Santacruz in urgent care today was 38  minutes. This time includes preparing for visit, reviewing any pertinent notes or test results, counseling/education, exam, obtaining HPI, interpretation of lab tests, medication management and documentation as indicated above.Time does not include separately billable procedures noted .       Please note that this dictation was created using voice recognition software. I have worked with consultants from the vendor as well as technical experts from [facility] to optimize the interface. I have made every reasonable attempt to correct obvious errors, but I expect that there are errors of grammar and possibly content that I did not discover before finalizing the note.  This note was electronically signed by provider

## 2023-01-25 ENCOUNTER — OFFICE VISIT (OUTPATIENT)
Dept: URGENT CARE | Facility: CLINIC | Age: 63
End: 2023-01-25
Payer: COMMERCIAL

## 2023-01-25 VITALS
TEMPERATURE: 97.2 F | WEIGHT: 190 LBS | BODY MASS INDEX: 26.6 KG/M2 | DIASTOLIC BLOOD PRESSURE: 78 MMHG | SYSTOLIC BLOOD PRESSURE: 124 MMHG | HEART RATE: 74 BPM | OXYGEN SATURATION: 95 % | HEIGHT: 71 IN | RESPIRATION RATE: 16 BRPM

## 2023-01-25 DIAGNOSIS — Z79.84 LONG TERM (CURRENT) USE OF ORAL HYPOGLYCEMIC DRUGS: ICD-10-CM

## 2023-01-25 DIAGNOSIS — S91.104A OPEN WOUND OF FIFTH TOE OF RIGHT FOOT, INITIAL ENCOUNTER: ICD-10-CM

## 2023-01-25 DIAGNOSIS — E11.9 CONTROLLED TYPE 2 DIABETES MELLITUS WITHOUT COMPLICATION, WITHOUT LONG-TERM CURRENT USE OF INSULIN (HCC): ICD-10-CM

## 2023-01-25 DIAGNOSIS — B35.3 TINEA PEDIS OF RIGHT FOOT: ICD-10-CM

## 2023-01-25 PROCEDURE — 99214 OFFICE O/P EST MOD 30 MIN: CPT | Performed by: NURSE PRACTITIONER

## 2023-01-25 RX ORDER — AMOXICILLIN AND CLAVULANATE POTASSIUM 875; 125 MG/1; MG/1
1 TABLET, FILM COATED ORAL 2 TIMES DAILY
Qty: 20 TABLET | Refills: 0 | Status: SHIPPED | OUTPATIENT
Start: 2023-01-25 | End: 2023-02-04

## 2023-01-25 RX ORDER — CEPHALEXIN 500 MG/1
500 CAPSULE ORAL 4 TIMES DAILY
Qty: 20 CAPSULE | Refills: 0 | Status: SHIPPED | OUTPATIENT
Start: 2023-01-25 | End: 2023-01-25

## 2023-01-26 NOTE — PROGRESS NOTES
Hu Santacruz is a 62 y.o. male who presents for Foot Pain (RIGHT, split between 4th and 5th toe, treated with tinactin at home with minor improvement. )      HPI  This is a new problem. Hu Santacruz is a 62 y.o. patient who presents to urgent care with c/o: 2-3 weeks of sore toe right foot between 4th and 5th toe.. He has been using Tinactin cream without improvement. He is on his feet more than normal.  The sore is white but is getting deeper.  He is starting to have pain on the pad of his foot when he is walking. He has diabetes and worried about wound. Has not seen his endocrinologist for awhile. No labs scheduled or follow up appt.   Treatments tried: Tinactin spray   Denies fever, chills, joint pain    No other aggravating or alleviating factors.       ROS See HPI    Allergies:       Allergies   Allergen Reactions    Metoprolol        PMSFS Hx:  Past Medical History:   Diagnosis Date    Adjustment disorder 4/16/2012    Back pain     CAD (coronary artery disease), native coronary artery 4/16/2012    CABG X3 (LIMA-LAD, SVG-2nd diag and R-PDA)-12/2016    Cold 2/21/13    DM (diabetes mellitus) ([facility]) 2008    oral agent    Hyperlipidemia 4/16/2012    Muscle disorder     Old myocardial infarction 2008    cardiologist; Dr. Olmstead    Pain     right shoulder    Situational anxiety 10/11/2016    Snoring      Past Surgical History:   Procedure Laterality Date    MULTIPLE CORONARY ARTERY BYPASS ENDO VEIN HARVEST  12/6/2016    Procedure: MULTIPLE CORONARY ARTERY BYPASS ENDO VEIN HARVEST X3 WITH LAITH;  Surgeon: Juan Pablo Schmitz M.D.;  Location: [facility];  Service:     SHOULDER ARTHROSCOPY W/ ROTATOR CUFF REPAIR  4/2/2013    Performed by Angelique Romero M.D. at [facility]    SHOULDER DECOMPRESSION ARTHROSCOPIC  4/2/2013    Performed by Angelique Romero M.D. at [facility]    JOINT INJECTION DIAGNOSTIC  4/2/2013    Performed by Angelique Romero M.D. at [facility]  [facility]    CLAVICLE DISTAL EXCISION  4/2/2013    Performed by Angelique Romero M.D. at SURGERY [facility] ORS    SHOULDER ARTHROSCOPY W/ BICIPITAL TENODESIS REPAIR  4/2/2013    Performed by Angelique Romero M.D. at SURGERY [facility] ORS    STENT PLACEMENT  2008    x3    DENTAL EXTRACTION(S)  1997    wisdom teeth    KNEE ARTHROSCOPY  1983    left    ORIF, PATELLA  1980    bilateral    ZZZ CARDIAC CATH       Family History   Adopted: Yes   Problem Relation Age of Onset    Heart Attack Neg Hx     Heart Disease Neg Hx      Social History     Tobacco Use    Smoking status: Never    Smokeless tobacco: Never   Substance Use Topics    Alcohol use: Yes     Alcohol/week: 3.0 oz     Types: 6 Cans of beer per week     Comment: 2 per week       Problems:   Patient Active Problem List   Diagnosis    CAD (coronary artery disease), native coronary artery    Dyslipidemia associated with type 2 diabetes mellitus (HCC)    Dyslipidemia    Calcifying tendinitis of shoulder    Bicipital tenosynovitis    Vasculogenic erectile dysfunction    S/P CABG (coronary artery bypass graft)    Long term (current) use of oral hypoglycemic drugs    Controlled type 2 diabetes mellitus without complication, without long-term current use of insulin ([facility])       Medications:   Current Outpatient Medications on File Prior to Visit   Medication Sig Dispense Refill    benzonatate (TESSALON) 100 MG Cap Take 1 Capsule by mouth 3 times a day as needed for Cough. 60 Capsule 0    sildenafil citrate (VIAGRA) 100 MG tablet TAKE ONE TABLET BY MOUTH DAILY AS NEEDED FOR ERECTILE DYSFUNCTION (VIAGRA) 30 Tablet 2    rosuvastatin (CRESTOR) 20 MG Tab Take 1 Tablet by mouth at bedtime. 90 Tablet 3    Dulaglutide (TRULICITY) 3 MG/0.5ML Solution Pen-injector Inject 0.5 mL under the skin every 7 days. 6 mL 3    XIGDUO XR 5-1000 MG TABLET SR 24 HR Take 1 Tablet by mouth 2 times a day. 180 Tablet 3    pioglitazone (ACTOS) 30 MG Tab Take 1 Tablet by mouth every day.  "90 Tablet 3    aspirin EC (ECOTRIN) 81 MG Tablet Delayed Response Take 81 mg by mouth every day.      vitamin D (CHOLECALCIFEROL) 1000 UNIT TABS Take 1,000 Units by mouth 2 times a day.         No current facility-administered medications on file prior to visit.          Objective:     /78   Pulse 74   Temp 36.2 °C (97.2 °F) (Temporal)   Resp 16   Ht 1.803 m (5' 11\")   Wt 86.2 kg (190 lb)   SpO2 95%   BMI 26.50 kg/m²     Physical Exam  Vitals and nursing note reviewed.   Constitutional:       Appearance: Normal appearance. He is normal weight.   Cardiovascular:      Rate and Rhythm: Normal rate.      Pulses: Normal pulses.   Pulmonary:      Effort: Pulmonary effort is normal.   Skin:     General: Skin is warm.      Capillary Refill: Capillary refill takes less than 2 seconds.      Findings: Erythema (mild erythema between 4th and 5th toe) and wound (between 4th and 5th toe) present.   Neurological:      Mental Status: He is alert and oriented to person, place, and time.   Psychiatric:         Mood and Affect: Mood normal.         Thought Content: Thought content normal.         Assessment /Associated Orders:      1. Open wound of fifth toe of right foot, initial encounter  Referral to Endocrinology    amoxicillin-clavulanate (AUGMENTIN) 875-125 MG Tab    DISCONTINUED: cephALEXin (KEFLEX) 500 MG Cap      2. Tinea pedis of right foot  Referral to Endocrinology    amoxicillin-clavulanate (AUGMENTIN) 875-125 MG Tab      3. Controlled type 2 diabetes mellitus without complication, without long-term current use of insulin ([facility])        4. Long term (current) use of oral hypoglycemic drugs              Medical Decision Making:    Pt is clinically stable at today's acute urgent care visit.  No acute distress noted. Appropriate for outpatient care at this time.   Acute  complicated problem today . Non-healing wound, progressive pain and erythema. Hemaglobin A1C (11 months ago) was 6.7.   Educated in proper " administration of  prescription medication(s) ordered today including safety, possible SE, risks, benefits, rationale and alternatives to therapy.   Schedule FU with endocrinology and PCP   Advised pt to get labwork done that was ordered by his endocrinologist. He was unaware that he had any due.    The patient is alerted to watch for any signs of infection (redness, pus, pain, increased swelling or fever) and call if such occurs. Home wound care instructions are provided.  Use dry gauze between toes to keep dry.       Discussed Dx, management options (risks,benefits, and alternatives to planned treatment), natural progression and supportive care.  Expressed understanding and the treatment plan was agreed upon.   Questions were encouraged and answered   Return to urgent care prn if new or worsening sx or if there is no improvement in condition prn.    Educated in Red flags and indications to immediately call 911 or present to the Emergency Department.           Please note that this dictation was created using voice recognition software. I have worked with consultants from the vendor as well as technical experts from [facility] to optimize the interface. I have made every reasonable attempt to correct obvious errors, but I expect that there are errors of grammar and possibly content that I did not discover before finalizing the note.  This note was electronically signed by provider

## 2023-02-13 ENCOUNTER — OFFICE VISIT (OUTPATIENT)
Dept: MEDICAL GROUP | Age: 63
End: 2023-02-13
Payer: COMMERCIAL

## 2023-02-13 VITALS
SYSTOLIC BLOOD PRESSURE: 118 MMHG | HEART RATE: 77 BPM | TEMPERATURE: 97 F | HEIGHT: 71 IN | DIASTOLIC BLOOD PRESSURE: 62 MMHG | OXYGEN SATURATION: 96 % | RESPIRATION RATE: 16 BRPM | WEIGHT: 187 LBS | BODY MASS INDEX: 26.18 KG/M2

## 2023-02-13 DIAGNOSIS — S91.109A OPEN WOUND OF TOE, INITIAL ENCOUNTER: ICD-10-CM

## 2023-02-13 DIAGNOSIS — Z23 NEED FOR VACCINATION: ICD-10-CM

## 2023-02-13 DIAGNOSIS — E78.5 DYSLIPIDEMIA ASSOCIATED WITH TYPE 2 DIABETES MELLITUS (HCC): ICD-10-CM

## 2023-02-13 DIAGNOSIS — E11.9 CONTROLLED TYPE 2 DIABETES MELLITUS WITHOUT COMPLICATION, WITHOUT LONG-TERM CURRENT USE OF INSULIN (HCC): ICD-10-CM

## 2023-02-13 DIAGNOSIS — E11.69 DYSLIPIDEMIA ASSOCIATED WITH TYPE 2 DIABETES MELLITUS (HCC): ICD-10-CM

## 2023-02-13 PROCEDURE — 99214 OFFICE O/P EST MOD 30 MIN: CPT | Mod: 25 | Performed by: PHYSICIAN ASSISTANT

## 2023-02-13 PROCEDURE — 90471 IMMUNIZATION ADMIN: CPT | Performed by: PHYSICIAN ASSISTANT

## 2023-02-13 PROCEDURE — 90686 IIV4 VACC NO PRSV 0.5 ML IM: CPT | Performed by: PHYSICIAN ASSISTANT

## 2023-02-13 ASSESSMENT — PATIENT HEALTH QUESTIONNAIRE - PHQ9: CLINICAL INTERPRETATION OF PHQ2 SCORE: 0

## 2023-02-13 NOTE — PROGRESS NOTES
Subjective:     Chief Complaint   Patient presents with    Annual Exam     Checkup patient states he has athletes foot and a blister right foot      Hu Santacruz is a 62 y.o. male here today for evaluation and management of:  Dyslipidemia associated with type 2 diabetes mellitus (HCC)  Reports he is doing well. Taking meds (trulicity, xigduo, actos and crestor) as prescribed. Denies hypoglycemic events.   Due for labs. Endo appt scheduled from 2/28/23    Open wound of toe, initial encounter  Reports improvement. Went to  1/25/23 after a few weeks for soreness in right 5th toe.  States he has hx of athlete's foot for which he uses tinactin regularly. Reports wound between 4th and 5th toes-- prescribed augmentin. Improved greatly.  Has purchased new boots and wears spacer between 4th and 5th toe. Has been using aquaphor routinely.       Current medicines (including changes today)  Current Outpatient Medications   Medication Sig Dispense Refill    sildenafil citrate (VIAGRA) 100 MG tablet TAKE ONE TABLET BY MOUTH DAILY AS NEEDED FOR ERECTILE DYSFUNCTION (VIAGRA) 30 Tablet 2    rosuvastatin (CRESTOR) 20 MG Tab Take 1 Tablet by mouth at bedtime. 90 Tablet 3    Dulaglutide (TRULICITY) 3 MG/0.5ML Solution Pen-injector Inject 0.5 mL under the skin every 7 days. 6 mL 3    XIGDUO XR 5-1000 MG TABLET SR 24 HR Take 1 Tablet by mouth 2 times a day. 180 Tablet 3    pioglitazone (ACTOS) 30 MG Tab Take 1 Tablet by mouth every day. 90 Tablet 3    aspirin EC (ECOTRIN) 81 MG Tablet Delayed Response Take 81 mg by mouth every day.      vitamin D (CHOLECALCIFEROL) 1000 UNIT TABS Take 1,000 Units by mouth 2 times a day.         No current facility-administered medications for this visit.        Objective:     Vitals:    02/13/23 0745   BP: 118/62   BP Location: Right arm   Patient Position: Standing   BP Cuff Size: Adult   Pulse: 77   Resp: 16   Temp: 36.1 °C (97 °F)   TempSrc: Temporal   SpO2: 96%   Weight: 84.8 kg (187 lb)  "  Height: 1.803 m (5' 11\")     Body mass index is 26.08 kg/m².     Physical Exam:  Constitutional: Alert, no distress, well-groomed.  Skin: Warm, dry, good turgor, no rashes in visible areas. Well healed wound between 4th and 5th right toes-- skin peeling, but not active oozing or ulceration.   Eye: Equal, round and reactive, conjunctiva clear, lids normal.  Neck: Trachea midline, no masses, no thyromegaly.  Cardiovascular: Regular rate and rhythm.  Chest: Effort normal. Clear to auscultation throughout. No adventitious sounds.   Abdomen: Soft, no gross masses.  MSK: Normal gait, moves all extremities.  Neuro: Grossly non-focal. No cranial nerve deficit. Strength and sensation intact.   Psych: Alert and oriented x3, normal affect and mood.    Assessment and Plan:   The following treatment plan was discussed:     1. Dyslipidemia associated with type 2 diabetes mellitus (HCC)  Chronic, stable. Diabetes and dyslipidemia under good control. Continue current medicines. Monitor labs regularly-- complete in next week. Declines A1c today, would like to do with other labs.  - HEMOGLOBIN A1C; Future    2. Open wound of toe, initial encounter  Subacute, improving. Well healed. Stop aquaphor to let it dry. If any changes/worsening, will get him in with wound care.  He will check foot daily.     3. Need for vaccination  VIS given. Verbal informed consent obtained. Vaccine administered in office.   - INFLUENZA VACCINE QUAD INJ (PF)      Health Maintenance: Shingrix unavailable in the clinic. To be administered at pharmacy.  COVID vaccine unavailable in the clinic. To be administered at pharmacy.     Followup: Return in about 6 months (around 8/13/2023) for establish care with new PCP, sooner if needed.               "

## 2023-02-14 RX ORDER — PIOGLITAZONEHYDROCHLORIDE 30 MG/1
TABLET ORAL
Qty: 90 TABLET | Refills: 0 | Status: SHIPPED | OUTPATIENT
Start: 2023-02-14 | End: 2023-02-28 | Stop reason: SDUPTHER

## 2023-02-18 ENCOUNTER — APPOINTMENT (OUTPATIENT)
Dept: RADIOLOGY | Facility: MEDICAL CENTER | Age: 63
End: 2023-02-18
Attending: EMERGENCY MEDICINE
Payer: COMMERCIAL

## 2023-02-18 ENCOUNTER — HOSPITAL ENCOUNTER (EMERGENCY)
Facility: MEDICAL CENTER | Age: 63
End: 2023-02-18
Attending: EMERGENCY MEDICINE
Payer: COMMERCIAL

## 2023-02-18 VITALS
HEIGHT: 71 IN | HEART RATE: 77 BPM | SYSTOLIC BLOOD PRESSURE: 110 MMHG | OXYGEN SATURATION: 95 % | TEMPERATURE: 98.1 F | RESPIRATION RATE: 16 BRPM | BODY MASS INDEX: 25.56 KG/M2 | DIASTOLIC BLOOD PRESSURE: 75 MMHG | WEIGHT: 182.54 LBS

## 2023-02-18 DIAGNOSIS — L02.91 ABSCESS: ICD-10-CM

## 2023-02-18 DIAGNOSIS — E11.69 TYPE 2 DIABETES MELLITUS WITH OTHER SPECIFIED COMPLICATION, UNSPECIFIED WHETHER LONG TERM INSULIN USE (HCC): ICD-10-CM

## 2023-02-18 DIAGNOSIS — L03.115 CELLULITIS OF RIGHT LOWER EXTREMITY: ICD-10-CM

## 2023-02-18 LAB
ALBUMIN SERPL BCP-MCNC: 4.1 G/DL (ref 3.2–4.9)
ALBUMIN/GLOB SERPL: 1.8 G/DL
ALP SERPL-CCNC: 62 U/L (ref 30–99)
ALT SERPL-CCNC: 16 U/L (ref 2–50)
ANION GAP SERPL CALC-SCNC: 12 MMOL/L (ref 7–16)
AST SERPL-CCNC: 19 U/L (ref 12–45)
BASOPHILS # BLD AUTO: 0.9 % (ref 0–1.8)
BASOPHILS # BLD: 0.06 K/UL (ref 0–0.12)
BILIRUB SERPL-MCNC: 0.3 MG/DL (ref 0.1–1.5)
BUN SERPL-MCNC: 15 MG/DL (ref 8–22)
CALCIUM ALBUM COR SERPL-MCNC: 8.7 MG/DL (ref 8.5–10.5)
CALCIUM SERPL-MCNC: 8.8 MG/DL (ref 8.4–10.2)
CHLORIDE SERPL-SCNC: 104 MMOL/L (ref 96–112)
CO2 SERPL-SCNC: 23 MMOL/L (ref 20–33)
CREAT SERPL-MCNC: 0.92 MG/DL (ref 0.5–1.4)
EOSINOPHIL # BLD AUTO: 0.18 K/UL (ref 0–0.51)
EOSINOPHIL NFR BLD: 2.6 % (ref 0–6.9)
ERYTHROCYTE [DISTWIDTH] IN BLOOD BY AUTOMATED COUNT: 43.5 FL (ref 35.9–50)
GFR SERPLBLD CREATININE-BSD FMLA CKD-EPI: 94 ML/MIN/1.73 M 2
GLOBULIN SER CALC-MCNC: 2.3 G/DL (ref 1.9–3.5)
GLUCOSE SERPL-MCNC: 136 MG/DL (ref 65–99)
HCT VFR BLD AUTO: 43 % (ref 42–52)
HGB BLD-MCNC: 15 G/DL (ref 14–18)
IMM GRANULOCYTES # BLD AUTO: 0.01 K/UL (ref 0–0.11)
IMM GRANULOCYTES NFR BLD AUTO: 0.1 % (ref 0–0.9)
LYMPHOCYTES # BLD AUTO: 1.7 K/UL (ref 1–4.8)
LYMPHOCYTES NFR BLD: 24.4 % (ref 22–41)
MCH RBC QN AUTO: 32.1 PG (ref 27–33)
MCHC RBC AUTO-ENTMCNC: 34.9 G/DL (ref 33.7–35.3)
MCV RBC AUTO: 92.1 FL (ref 81.4–97.8)
MONOCYTES # BLD AUTO: 0.72 K/UL (ref 0–0.85)
MONOCYTES NFR BLD AUTO: 10.3 % (ref 0–13.4)
NEUTROPHILS # BLD AUTO: 4.29 K/UL (ref 1.82–7.42)
NEUTROPHILS NFR BLD: 61.7 % (ref 44–72)
NRBC # BLD AUTO: 0 K/UL
NRBC BLD-RTO: 0 /100 WBC
PLATELET # BLD AUTO: 232 K/UL (ref 164–446)
PMV BLD AUTO: 8.8 FL (ref 9–12.9)
POTASSIUM SERPL-SCNC: 4.1 MMOL/L (ref 3.6–5.5)
PROT SERPL-MCNC: 6.4 G/DL (ref 6–8.2)
RBC # BLD AUTO: 4.67 M/UL (ref 4.7–6.1)
SODIUM SERPL-SCNC: 139 MMOL/L (ref 135–145)
WBC # BLD AUTO: 7 K/UL (ref 4.8–10.8)

## 2023-02-18 PROCEDURE — 700111 HCHG RX REV CODE 636 W/ 250 OVERRIDE (IP): Performed by: EMERGENCY MEDICINE

## 2023-02-18 PROCEDURE — 90715 TDAP VACCINE 7 YRS/> IM: CPT | Performed by: EMERGENCY MEDICINE

## 2023-02-18 PROCEDURE — 90471 IMMUNIZATION ADMIN: CPT

## 2023-02-18 PROCEDURE — A9270 NON-COVERED ITEM OR SERVICE: HCPCS | Performed by: EMERGENCY MEDICINE

## 2023-02-18 PROCEDURE — 73630 X-RAY EXAM OF FOOT: CPT | Mod: RT

## 2023-02-18 PROCEDURE — 80053 COMPREHEN METABOLIC PANEL: CPT

## 2023-02-18 PROCEDURE — 304217 HCHG IRRIGATION SYSTEM

## 2023-02-18 PROCEDURE — 85025 COMPLETE CBC W/AUTO DIFF WBC: CPT

## 2023-02-18 PROCEDURE — 99284 EMERGENCY DEPT VISIT MOD MDM: CPT

## 2023-02-18 PROCEDURE — 700102 HCHG RX REV CODE 250 W/ 637 OVERRIDE(OP): Performed by: EMERGENCY MEDICINE

## 2023-02-18 PROCEDURE — 36415 COLL VENOUS BLD VENIPUNCTURE: CPT

## 2023-02-18 PROCEDURE — 97597 DBRDMT OPN WND 1ST 20 CM/<: CPT

## 2023-02-18 RX ORDER — SULFAMETHOXAZOLE AND TRIMETHOPRIM 800; 160 MG/1; MG/1
1 TABLET ORAL ONCE
Status: COMPLETED | OUTPATIENT
Start: 2023-02-18 | End: 2023-02-18

## 2023-02-18 RX ORDER — AMOXICILLIN AND CLAVULANATE POTASSIUM 875; 125 MG/1; MG/1
1 TABLET, FILM COATED ORAL 2 TIMES DAILY
Qty: 14 TABLET | Refills: 0 | Status: SHIPPED | OUTPATIENT
Start: 2023-02-18 | End: 2023-02-25

## 2023-02-18 RX ORDER — AMOXICILLIN AND CLAVULANATE POTASSIUM 875; 125 MG/1; MG/1
1 TABLET, FILM COATED ORAL ONCE
Status: COMPLETED | OUTPATIENT
Start: 2023-02-18 | End: 2023-02-18

## 2023-02-18 RX ORDER — SULFAMETHOXAZOLE AND TRIMETHOPRIM 800; 160 MG/1; MG/1
1 TABLET ORAL 2 TIMES DAILY
Qty: 14 TABLET | Refills: 0 | Status: SHIPPED | OUTPATIENT
Start: 2023-02-18 | End: 2023-02-25

## 2023-02-18 RX ADMIN — AMOXICILLIN AND CLAVULANATE POTASSIUM 1 TABLET: 875; 125 TABLET, FILM COATED ORAL at 17:39

## 2023-02-18 RX ADMIN — SULFAMETHOXAZOLE AND TRIMETHOPRIM 1 TABLET: 800; 160 TABLET ORAL at 17:40

## 2023-02-18 RX ADMIN — CLOSTRIDIUM TETANI TOXOID ANTIGEN (FORMALDEHYDE INACTIVATED), CORYNEBACTERIUM DIPHTHERIAE TOXOID ANTIGEN (FORMALDEHYDE INACTIVATED), BORDETELLA PERTUSSIS TOXOID ANTIGEN (GLUTARALDEHYDE INACTIVATED), BORDETELLA PERTUSSIS FILAMENTOUS HEMAGGLUTININ ANTIGEN (FORMALDEHYDE INACTIVATED), BORDETELLA PERTUSSIS PERTACTIN ANTIGEN, AND BORDETELLA PERTUSSIS FIMBRIAE 2/3 ANTIGEN 0.5 ML: 5; 2; 2.5; 5; 3; 5 INJECTION, SUSPENSION INTRAMUSCULAR at 17:40

## 2023-02-19 DIAGNOSIS — E11.9 CONTROLLED TYPE 2 DIABETES MELLITUS WITHOUT COMPLICATION, WITHOUT LONG-TERM CURRENT USE OF INSULIN (HCC): ICD-10-CM

## 2023-02-19 LAB
ALBUMIN SERPL-MCNC: 4.8 G/DL (ref 3.8–4.8)
ALBUMIN/CREAT UR: 7 MG/G CREAT (ref 0–29)
ALBUMIN/GLOB SERPL: 2.7 {RATIO} (ref 1.2–2.2)
ALP SERPL-CCNC: 64 IU/L (ref 44–121)
ALT SERPL-CCNC: 16 IU/L (ref 0–44)
AST SERPL-CCNC: 16 IU/L (ref 0–40)
BILIRUB SERPL-MCNC: 0.5 MG/DL (ref 0–1.2)
BUN SERPL-MCNC: 15 MG/DL (ref 8–27)
BUN/CREAT SERPL: 17 (ref 10–24)
CALCIUM SERPL-MCNC: 9.1 MG/DL (ref 8.6–10.2)
CHLORIDE SERPL-SCNC: 103 MMOL/L (ref 96–106)
CHOLEST SERPL-MCNC: 141 MG/DL (ref 100–199)
CO2 SERPL-SCNC: 24 MMOL/L (ref 20–29)
CREAT SERPL-MCNC: 0.86 MG/DL (ref 0.76–1.27)
CREAT UR-MCNC: 71.3 MG/DL
EGFRCR SERPLBLD CKD-EPI 2021: 99 ML/MIN/1.73
FSH SERPL-ACNC: 6.8 MIU/ML (ref 1.5–12.4)
GLOBULIN SER CALC-MCNC: 1.8 G/DL (ref 1.5–4.5)
GLUCOSE SERPL-MCNC: 119 MG/DL (ref 70–99)
HBA1C MFR BLD: 6.6 % (ref 4.8–5.6)
HDLC SERPL-MCNC: 53 MG/DL
LABORATORY COMMENT REPORT: NORMAL
LDLC SERPL CALC-MCNC: 76 MG/DL (ref 0–99)
LH SERPL-ACNC: 3.6 MIU/ML (ref 1.7–8.6)
MICROALBUMIN UR-MCNC: 4.9 UG/ML
POTASSIUM SERPL-SCNC: 4.3 MMOL/L (ref 3.5–5.2)
PROLACTIN SERPL-MCNC: 8.7 NG/ML (ref 4–15.2)
PROT SERPL-MCNC: 6.6 G/DL (ref 6–8.5)
SHBG SERPL-SCNC: 34.6 NMOL/L (ref 19.3–76.4)
SODIUM SERPL-SCNC: 141 MMOL/L (ref 134–144)
T4 FREE SERPL-MCNC: 1.73 NG/DL (ref 0.82–1.77)
TESTOST FREE SERPL-MCNC: 3.6 PG/ML (ref 6.6–18.1)
TESTOST SERPL-MCNC: 146 NG/DL (ref 264–916)
TRIGL SERPL-MCNC: 54 MG/DL (ref 0–149)
TSH SERPL DL<=0.005 MIU/L-ACNC: 1.07 UIU/ML (ref 0.45–4.5)
VLDLC SERPL CALC-MCNC: 12 MG/DL (ref 5–40)

## 2023-02-19 NOTE — ED NOTES
Pt wound dressed with dry gauze and tape. Pt instructed to clean wound with warm soap and water, and to keep wound dry/change dressing twice daily. Pt verbalized understanding. Pt wound outlined with skin marker and dated/timed for monitoring. Pt aware to return if erythema progresses, he develops fevers, etc - pt verbalized understanding.    Pt discharged home in stable condition. VSS. Given prescriptions and denies questions. Follow-up care and wound care referral reviewed, all questions answered. Pt ambulatory out of ER with steady gait. States safe ride home.

## 2023-02-19 NOTE — ED PROVIDER NOTES
"ED Provider Note    CHIEF COMPLAINT  Chief Complaint   Patient presents with    Wound Check     Reports wound between R 4th and 5th toes, previously on antibx for same, states \"I wanted to ride it out, but now it's shooting pus out of it and theres a red streak going up my leg\". Denies known fevers.       EXTERNAL RECORDS REVIEWED  Previous outpatient clinic notes.    HPI/ROS  LIMITATION TO HISTORY   Select: : None  OUTSIDE HISTORIAN(S):  None    Hu Santacruz is a 62 y.o. male who presents to the emergency department for evaluation of pain and swelling and redness in his foot.  The patient has a history of type 2 diabetes but the pain redness and swelling in his foot for some time.  Initially he had a blister with maybe some athlete's foot and got an infection in late January was on some antibiotics.  He took these for about a week and seem to wax and wane since that time.  Now he is notes more pain and swelling between his fourth and fifth toe this morning expressed a lot of pus.  He had pain redness and swelling on the dorsum of his foot since that time.  Tetanus is not up-to-date.  No fevers or chills or systemic complaints.  He is type II diabetic does not check his blood sugars.  Denies any other aggravating alleviating factors or associated complaints.    PAST MEDICAL HISTORY   has a past medical history of Adjustment disorder (4/16/2012), Back pain, CAD (coronary artery disease), native coronary artery (4/16/2012), Cold (2/21/13), DM (diabetes mellitus) ([facility]) (2008), Hyperlipidemia (4/16/2012), Muscle disorder, Old myocardial infarction (2008), Pain, Situational anxiety (10/11/2016), and Snoring.    SURGICAL HISTORY   has a past surgical history that includes orif, patella (1980); knee arthroscopy (1983); dental extraction(s) (1997); shoulder arthroscopy w/ rotator cuff repair (4/2/2013); shoulder decompression arthroscopic (4/2/2013); joint injection diagnostic (4/2/2013); clavicle distal excision " "(4/2/2013); shoulder arthroscopy w/ bicipital tenodesis repair (4/2/2013); stent placement (2008); multiple coronary artery bypass endo vein harvest (12/6/2016); and [facility] cardiac cath.    FAMILY HISTORY  Family History   Adopted: Yes   Problem Relation Age of Onset    Heart Attack Neg Hx     Heart Disease Neg Hx        SOCIAL HISTORY  Social History     Tobacco Use    Smoking status: Never    Smokeless tobacco: Never   Vaping Use    Vaping Use: Never used   Substance and Sexual Activity    Alcohol use: Yes     Alcohol/week: 3.0 oz     Types: 6 Cans of beer per week     Comment: 1-2 drinks/mo    Drug use: No     Comment: Former Marijuana usage    Sexual activity: Yes     Partners: Female       CURRENT MEDICATIONS  Home Medications       Reviewed by Amairani Lane R.N. (Registered Nurse) on 02/18/23 at 1333  Med List Status: Not Addressed     Medication Last Dose Status   aspirin EC (ECOTRIN) 81 MG Tablet Delayed Response  Active   Dulaglutide (TRULICITY) 3 MG/0.5ML Solution Pen-injector  Active   pioglitazone (ACTOS) 30 MG Tab  Active   rosuvastatin (CRESTOR) 20 MG Tab  Active   sildenafil citrate (VIAGRA) 100 MG tablet  Active   vitamin D (CHOLECALCIFEROL) 1000 UNIT TABS  Active   XIGDUO XR 5-1000 MG TABLET SR 24 HR  Active                    ALLERGIES  Allergies   Allergen Reactions    Metoprolol        PHYSICAL EXAM  VITAL SIGNS: /51   Pulse 84   Temp 36.7 °C (98.1 °F) (Temporal)   Resp 20   Ht 1.803 m (5' 11\")   Wt 82.8 kg (182 lb 8.7 oz)   SpO2 97%   BMI 25.46 kg/m²      PHYSICAL EXAM  VITAL SIGNS: /51   Pulse 84   Temp 36.7 °C (98.1 °F) (Temporal)   Resp 20   Ht 1.803 m (5' 11\")   Wt 82.8 kg (182 lb 8.7 oz)   SpO2 97%   BMI 25.46 kg/m²    Constitutional: Well developed, Well nourished, No acute distress, Non-toxic appearance.   HENT: Normocephalic, Atraumatic, Bilateral external ears normal  Eyes: PERRL, EOMI, Conjunctiva normal, No discharge.   Neck: Normal range of " motion  Cardiovascular: Normal heart rate, Normal rhythm, No murmurs, No rubs, No gallops.   Thorax & Lungs: Normal breath sounds, No respiratory distress, No wheezing  Abdomen: Bowel sounds normal, Soft, No tenderness  Skin: Warm, Dry, No erythema, No rash.   Musculoskeletal: Good range of motion in all major joints.  Right foot has cellulitis on the dorsum.  Between the fourth and fifth toe there is white skin with fluctuance and purulence underneath.  Neurologic: Alert,  No focal deficits noted.   Psychiatric: Affect normal        DIAGNOSTIC STUDIES / PROCEDURES  EKG  Results for orders placed or performed during the hospital encounter of 02/18/23   CBC WITH DIFFERENTIAL   Result Value Ref Range    WBC 7.0 4.8 - 10.8 K/uL    RBC 4.67 (L) 4.70 - 6.10 M/uL    Hemoglobin 15.0 14.0 - 18.0 g/dL    Hematocrit 43.0 42.0 - 52.0 %    MCV 92.1 81.4 - 97.8 fL    MCH 32.1 27.0 - 33.0 pg    MCHC 34.9 33.7 - 35.3 g/dL    RDW 43.5 35.9 - 50.0 fL    Platelet Count 232 164 - 446 K/uL    MPV 8.8 (L) 9.0 - 12.9 fL    Neutrophils-Polys 61.70 44.00 - 72.00 %    Lymphocytes 24.40 22.00 - 41.00 %    Monocytes 10.30 0.00 - 13.40 %    Eosinophils 2.60 0.00 - 6.90 %    Basophils 0.90 0.00 - 1.80 %    Immature Granulocytes 0.10 0.00 - 0.90 %    Nucleated RBC 0.00 /100 WBC    Neutrophils (Absolute) 4.29 1.82 - 7.42 K/uL    Lymphs (Absolute) 1.70 1.00 - 4.80 K/uL    Monos (Absolute) 0.72 0.00 - 0.85 K/uL    Eos (Absolute) 0.18 0.00 - 0.51 K/uL    Baso (Absolute) 0.06 0.00 - 0.12 K/uL    Immature Granulocytes (abs) 0.01 0.00 - 0.11 K/uL    NRBC (Absolute) 0.00 K/uL   COMP METABOLIC PANEL   Result Value Ref Range    Sodium 139 135 - 145 mmol/L    Potassium 4.1 3.6 - 5.5 mmol/L    Chloride 104 96 - 112 mmol/L    Co2 23 20 - 33 mmol/L    Anion Gap 12.0 7.0 - 16.0    Glucose 136 (H) 65 - 99 mg/dL    Bun 15 8 - 22 mg/dL    Creatinine 0.92 0.50 - 1.40 mg/dL    Calcium 8.8 8.4 - 10.2 mg/dL    AST(SGOT) 19 12 - 45 U/L    ALT(SGPT) 16 2 - 50 U/L     Alkaline Phosphatase 62 30 - 99 U/L    Total Bilirubin 0.3 0.1 - 1.5 mg/dL    Albumin 4.1 3.2 - 4.9 g/dL    Total Protein 6.4 6.0 - 8.2 g/dL    Globulin 2.3 1.9 - 3.5 g/dL    A-G Ratio 1.8 g/dL   CORRECTED CALCIUM   Result Value Ref Range    Correct Calcium 8.7 8.5 - 10.5 mg/dL   ESTIMATED GFR   Result Value Ref Range    GFR (CKD-EPI) 94 >60 mL/min/1.73 m 2            RADIOLOGY  I have independently interpreted the diagnostic imaging associated with this visit and am waiting the final reading from the radiologist.   My preliminary interpretation is a follows: No obvious abnormalities.  Radiologist interpretation:  DX-FOOT-COMPLETE 3+ RIGHT   Final Result      No evidence of acute fracture or dislocation.              INITIAL ASSESSMENT, COURSE AND PLAN  Care Narrative: The patient presents emerged department with pain and swelling in his right foot.  He is a diabetic he does not check his blood sugars.  His tetanus is updated today.    X-ray does not show any bony abnormality or any subcu air.  The wound between his right fourth and fifth toe was evaluated and there is some skin with some fluctuance that requires debriding.    Procedure note  The area did not require anesthesia there is already purulent drainage and some whitish skin that had become exfoliated from the underlying tissue.  This is debrided more pus is expressed is then cleaned and wet-to-dry dressing is applied.    Area of his cellulitis is outlined.    The patient is not febrile ill or toxic he is not tachycardic or febrile he does not have a white count.  He has some localized cellulitis and his sugar is actually fairly reassuring.  It is reasonable to treat this with oral antibiotics.  There is a chance that this will progress to requiring IV antibiotics.  For the open wound we will provide wound care and to try to do wet-to-dry dressings this is performed by the nursing staff.    He is really advised to keep this out of his shoes and let this air  dry and keep his foot elevated clean and dry.  He works in construction and requires boots all day.  I have advised him to take a couple days off and keep this clean and dry and let it air out.    He is referred to the wound care clinic for continued work-up and treatment.    He was started on Augmentin and Septra.  He was on Augmentin in the past for diabetic foot infection.  Susceptible and for purulence.    At this point the patient does not have an indication for hospitalization and can be discharged home with return precautions and follow-up.  He is agreeable with the plan.    He will maintain a low threshold for returning.  Return for increasing pain redness swelling fever or other concerns.  Questions are answered is agreeable to plan he is discharged in good condition.          ADDITIONAL PROBLEM LIST  Diabetes  DISPOSITION AND DISCUSSIONS    Discussion of management with other \[facility]\"" or appropriate source(s): Pharmacy for antibiotic selection.    Escalation of care considered, and ultimately not performed:acute inpatient care management, however at this time, the patient is most appropriate for outpatient management    Barriers to care at this time, including but not limited to: Patient has not been evaluated by the wound care clinic he will be referred..     Decision tools and prescription drugs considered including, but not limited to: Patient started oral antibiotics given close return precautions.    Jen Ortiz P.A.-C.  [address]  [address]  [place] [id number] [phone]          [facility]  [address]  [facility] [address]  [phone]  Schedule an appointment as soon as possible for a visit in 2 days        FINAL DIAGNOSIS  1. Cellulitis of right lower extremity    2. Abscess    3. Type 2 diabetes mellitus with other specified complication, unspecified whether long term insulin use (HCC)           Electronically signed by: Sterling Lizarraga M.D., 2/18/2023 4:27 PM

## 2023-02-19 NOTE — DISCHARGE INSTRUCTIONS
Take antibiotics as prescribed.  Return to the emergency department for increasing redness, swelling, if you have fever, high blood sugar, or other concerns.  Follow-up with your doctor.  Change dressing twice a day.  I recommend you stay off work and rest and elevate your foot for a few days.  Follow-up with wound care clinic.

## 2023-02-19 NOTE — ED NOTES
Pt resting in stretcher with even, unlabored respirations. Plan of care reviewed, all questions answered. Denies additional needs at this time. Call light within reach.

## 2023-02-20 ENCOUNTER — NON-PROVIDER VISIT (OUTPATIENT)
Dept: WOUND CARE | Facility: MEDICAL CENTER | Age: 63
End: 2023-02-20
Attending: EMERGENCY MEDICINE
Payer: COMMERCIAL

## 2023-02-20 PROCEDURE — 99211 OFF/OP EST MAY X REQ PHY/QHP: CPT

## 2023-02-20 PROCEDURE — 97602 WOUND(S) CARE NON-SELECTIVE: CPT

## 2023-02-20 NOTE — CERTIFICATION
Non Provider Encounter- Diabetic Foot Ulcer      HISTORY OF PRESENT ILLNESS  Wound History:    START OF CARE IN CLINIC: 02/20/2023    REFERRING PROVIDER: Dr Sterling Lizarraga MD   WOUND- Diabetic foot ulcer   LOCATION: R 5th toe web   HISTORY: Pt is a 62 year-old gentleman with DM 2 beginning Feb noticed macerated area between 5th and 4th toes, became red and itchy, he thought he had athlete's foot so started using Tinactin spray 2 x day. This was not working, and pt says he woke up on the morning on 02/18/2023 with swelling and redness of his R foot and toes, so he went to ER. He was given augmentin and bactrim and told to wash foot with soap and water and apply DSD, and he was referred to [facility].     Pertinent Medical History:  Type 2 diabetes mellitus with dyslipidemia     DIABETES HX: Diagnosed with type 2 diabetes 10 years ago, and is currently managing with Actos, Trulicity, Xigduo .  Checks blood sugars never.  Has  had previous diabetes education.  Does not have numbness in feet.  Usually wears regular shoes. Does  check feet routinely.  Has not had previous foot ulcers or foot surgery.  Current occupation construction work.  Offloading none.        TOBACCO USE: never     OFFLOADING: none. I discussed custom diabetic shoes and orthoses, pt declines these at this time. He doesn't have any other foot deformities or significant calluses.    Pertinent Labs and Diagnostics:    Labs:     A1c:   Lab Results   Component Value Date/Time    HBA1C 6.6 (H) 02/14/2023 04:47 AM    HBA1C 6.7 (A) 02/18/2022 04:47 PM          IMAGING: none    VASCULAR STUDIES: None. Exam today in clinic  - pedal pulses palpable 2+, and doppler exam reveals triphasic waveforms to DP/PT zoey     LAST  WOUND CULTURE:  DATE : na. Deferred today as there are no clinical ss infection present.              Patient allergies and medications reviewed via Epic.     WOUND ASSESSMENT-            Wound 02/20/23 Diabetic Ulcer Toe, 5th Right 5th toe web (Active)    Wound Image   02/20/23 1600   Site Assessment Canones 02/20/23 1600   Periwound Assessment Dry;Intact 02/20/23 1600   Margins Unattached edges 02/20/23 1600   Closure Secondary intention 02/20/23 1600   Drainage Amount Scant 02/20/23 1600   Drainage Description Serosanguineous 02/20/23 1600   Treatments Cleansed 02/20/23 1600   Wound Cleansing Puracyn Harmony 02/20/23 1600   Periwound Protectant Skin Protectant Wipes to Periwound 02/20/23 1600   Dressing Cleansing/Solutions Normal Saline 02/20/23 1600   Dressing Options Collagen Dressing;Hydrofiber Silver;Hypafix Tape 02/20/23 1600   Dressing Changed New 02/20/23 1600   Dressing Status Clean;Dry;Intact 02/20/23 1600   Dressing Change/Treatment Frequency Every 72 hrs, and As Needed 02/20/23 1600   Non-staged Wound Description Full thickness 02/20/23 1600   Wound Length (cm) 0.2 cm 02/20/23 1600   Wound Width (cm) 0.1 cm 02/20/23 1600   Wound Depth (cm) 0.2 cm 02/20/23 1600   Wound Surface Area (cm^2) 0.02 cm^2 02/20/23 1600   Wound Volume (cm^3) 0.004 cm^3 02/20/23 1600   Wound Bed Granulation (%) 100 % 02/20/23 1600   Tunneling (cm) 0 cm 02/20/23 1600   Undermining (cm) 0 cm 02/20/23 1600   Wound Odor None 02/20/23 1600   Pulses DP;PT;2+;Doppler 02/20/23 1600   Right Foot Monofilament 10-point exam (Sensate) 10/10 02/20/23 1600   Left Foot Monofilament 10-point exam (Sensate) 10/10 02/20/23 1600   Exposed Structures None 02/20/23 1600            -Refer to flowsheet for wound care details.           PATIENT EDUCATION  - Importance of tight glucose control for wound healing. Instr pt to call PCP for new Rx for glucometer and test strips, as he has lost his and no longer checks his BS  - Implications of loss of protective sensation (LOPS) discussed with patient- including increased risk for amputation.  - Advised to check feet at least daily, moisturize feet, and to always wear protective foot wear.   -  Importance of offloading foot to assist with wound healing  -  Advised pt not to trim nails or calluses, seek foot/nail care from podiatrist or certified foot/nail nurse  - Importance of adequate nutrition for wound healing

## 2023-02-21 RX ORDER — DULAGLUTIDE 3 MG/.5ML
INJECTION, SOLUTION SUBCUTANEOUS
Qty: 12 ML | Refills: 0 | Status: SHIPPED | OUTPATIENT
Start: 2023-02-21 | End: 2023-02-28 | Stop reason: SDUPTHER

## 2023-02-21 NOTE — PATIENT INSTRUCTIONS
After Visit Summary Wound Care Instructions    Nutrition - Patient instructed increased protein diet unless contraindicated in renal failure (meat, eggs, fish, yogurt, cottage cheese, beans), use of multivitamin with minerals and Arginaid supplementation (check if ok with Primary Care Provider first).    Infection -  instructed signs and symptoms of infection, increased redness and swelling, localized heat over wound and surrounding area/fever/chills/nausea and vomiting, when to call doctor or go to Emergency Room.     Dressing Changes - Instructed patient rationale for wound care products. Instructed to keep dressings clean and dry, shower on clinic days right before coming in. Change your dressing if it becomes soiled, soaked, or falls off.      Dressing change procedure   Remove old dressing.  Cleanse the wound with saline and gauze.  Dry surrounding skin with gauze, then apply skin prep wipe, allow to dry.  Cut a small piece of collagen silver dressing, and pack loosely into wound to fill the dead space and the undermining areas. Moisten this with saline.   Cut a larger piece of Aquacel silver and place this on top.   Cover with foam dressing if needed,  and secure with the hypafix tape.   Change 1-2 x week and as needed at home, come to wound clinic 1 x week.   Change your dressing if it becomes soiled, soaked, or falls off.    Instructed patient about fall prevention.      Diabetic Instruction - Patient instructed keep tight control over Blood Sugar, <140 for optimal wound healing; Implications of loss of protective sensation (LOPS) discussed with patient, including increased risk for amputation.  Advised to check feet at least daily, moisturize feet, and to always wear protective foot wear, arrange meticulous regular foot care by podiatrist or Certified Foot Care Nurse (CFCN). Patient with good understanding.       Questions - should you have any questions regarding your home care instructions, please  contact the wound center at [phone]. If after hours, contact your primary care physician or go to the hospital emergency room.

## 2023-02-24 ENCOUNTER — NON-PROVIDER VISIT (OUTPATIENT)
Dept: WOUND CARE | Facility: MEDICAL CENTER | Age: 63
End: 2023-02-24
Attending: EMERGENCY MEDICINE
Payer: COMMERCIAL

## 2023-02-24 PROCEDURE — 99211 OFF/OP EST MAY X REQ PHY/QHP: CPT

## 2023-02-24 NOTE — PATIENT INSTRUCTIONS
-Keep dressings clean and dry. Change dressings every 3-4 days, and if the dressings become saturated, soiled, or fall off.    -Avoid prolonged standing or sitting without elevating your legs.    -Never walk around the house barefoot. Always wear a rubber soled slipper when walking around the house.    -Should you experience any significant changes in your wound(s), such as signs of infection (increasing redness, swelling, localized heat, increased pain, fever > 101 F, chills) or have any questions regarding your home care instructions, please contact the wound center at [phone]. If after hours, contact your primary care physician or go to the hospital emergency room.     -If you are 5 or more minutes late for an appointment, we reserve the right to cancel and reschedule that appointment. Additionally, if you are habitually late or not showing (3 late cancellations and/or no shows), we reserve the right to cancel your remaining appointments and it will be your responsibility to obtain a new referral if services are still needed.

## 2023-02-24 NOTE — PROGRESS NOTES
Patient instructed to change dressings in 2-3 days or if dressing becomes soiled/saturated/dislodged.  Instructed patient that christina wound is macerated today showing that it is too wet. Discontinued collagen dressing to prevent added moisture. Patient instructed on dressing change procedure. Dressing instructions written down for patient with verbal explanation.  Patient verbalized understanding of all.

## 2023-02-27 DIAGNOSIS — N52.9 ERECTILE DYSFUNCTION, UNSPECIFIED ERECTILE DYSFUNCTION TYPE: ICD-10-CM

## 2023-02-27 RX ORDER — SILDENAFIL 100 MG/1
TABLET, FILM COATED ORAL
Qty: 30 TABLET | Refills: 2 | Status: SHIPPED | OUTPATIENT
Start: 2023-02-27 | End: 2023-11-20

## 2023-02-28 ENCOUNTER — NON-PROVIDER VISIT (OUTPATIENT)
Dept: ENDOCRINOLOGY | Facility: MEDICAL CENTER | Age: 63
End: 2023-02-28
Attending: INTERNAL MEDICINE
Payer: COMMERCIAL

## 2023-02-28 VITALS
WEIGHT: 189 LBS | HEART RATE: 68 BPM | OXYGEN SATURATION: 97 % | HEIGHT: 71 IN | SYSTOLIC BLOOD PRESSURE: 112 MMHG | BODY MASS INDEX: 26.46 KG/M2 | DIASTOLIC BLOOD PRESSURE: 68 MMHG

## 2023-02-28 DIAGNOSIS — E11.9 CONTROLLED TYPE 2 DIABETES MELLITUS WITHOUT COMPLICATION, WITHOUT LONG-TERM CURRENT USE OF INSULIN (HCC): ICD-10-CM

## 2023-02-28 DIAGNOSIS — E78.5 DYSLIPIDEMIA ASSOCIATED WITH TYPE 2 DIABETES MELLITUS (HCC): ICD-10-CM

## 2023-02-28 DIAGNOSIS — I25.10 CORONARY ARTERY DISEASE INVOLVING NATIVE CORONARY ARTERY OF NATIVE HEART WITHOUT ANGINA PECTORIS: Chronic | ICD-10-CM

## 2023-02-28 DIAGNOSIS — E11.69 DYSLIPIDEMIA ASSOCIATED WITH TYPE 2 DIABETES MELLITUS (HCC): ICD-10-CM

## 2023-02-28 LAB
HBA1C MFR BLD: 6.7 % (ref ?–5.8)
POCT INT CON NEG: NEGATIVE
POCT INT CON POS: POSITIVE

## 2023-02-28 PROCEDURE — 99212 OFFICE O/P EST SF 10 MIN: CPT | Performed by: INTERNAL MEDICINE

## 2023-02-28 PROCEDURE — 83036 HEMOGLOBIN GLYCOSYLATED A1C: CPT

## 2023-02-28 RX ORDER — DULAGLUTIDE 3 MG/.5ML
1 INJECTION, SOLUTION SUBCUTANEOUS
Qty: 6 ML | Refills: 3 | Status: SHIPPED | OUTPATIENT
Start: 2023-02-28 | End: 2023-07-06

## 2023-02-28 RX ORDER — PIOGLITAZONEHYDROCHLORIDE 30 MG/1
30 TABLET ORAL DAILY
Qty: 90 TABLET | Refills: 3 | Status: SHIPPED | OUTPATIENT
Start: 2023-02-28

## 2023-02-28 RX ORDER — ROSUVASTATIN CALCIUM 20 MG/1
20 TABLET, COATED ORAL
Qty: 90 TABLET | Refills: 3 | Status: SHIPPED | OUTPATIENT
Start: 2023-02-28 | End: 2023-11-22 | Stop reason: SDUPTHER

## 2023-02-28 RX ORDER — DAPAGLIFLOZIN AND METFORMIN HYDROCHLORIDE 5; 1000 MG/1; MG/1
1 TABLET, FILM COATED, EXTENDED RELEASE ORAL 2 TIMES DAILY
Qty: 180 TABLET | Refills: 3 | Status: SHIPPED | OUTPATIENT
Start: 2023-02-28

## 2023-02-28 ASSESSMENT — FIBROSIS 4 INDEX: FIB4 SCORE: 1.25

## 2023-02-28 NOTE — PROGRESS NOTES
RN-CDE Note    Subjective:   Endocrinology Clinic Progress Note  PCP: Jen Ortiz P.A.-C.    HPI:  Hu Santacruz is a 62 y.o. old patient who is seen today by the Diabetes Nurse Specialist for review of Type 2 Diabetes.  Recent changes in health: States had infection between 4th and 5th toe.  He has been going to wound care and it is healing well.  DM:   Last A1c:   Lab Results   Component Value Date/Time    HBA1C 6.7 (A) 02/28/2023 07:49 AM      Previous A1c was 6.6 on 2/14/23  A1C GOAL: < 7    Diabetes Medications:   Trulicy 3 mg weekly  Xigduo XR 5-1000 mg BID  Actos 30 mg daily      Exercise: Walking at work  Diet: States more hungry  Patient's body mass index is 26.36 kg/m². Exercise and nutrition counseling were performed at this visit.    Glucose monitoring frequency: Not testing blood sugars    Hypoglycemic episodes: no  Last Retinal Exam: on file and up-to-date  Daily Foot Exam: Yes   Foot Exam:  Monofilament: done  Monofilament testing with a 10 gram force: sensation intact: intact bilaterally  Visual Inspection: Feet with maceration, ulcers, fissures.  Wound almost completely healed.  Pedal pulses: intact bilaterally   Lab Results   Component Value Date/Time    MALBCRT see below 04/02/2019 07:14 AM    MICROALBUR <0.7 04/02/2019 07:14 AM    MICRALB 4.9 02/14/2023 04:47 AM        ACR Albumin/Creatinine Ratio goal <30     HTN:   Blood pressure goal <130/<80 .   Currently Rx ACE/ARB: No     Dyslipidemia:    Lab Results   Component Value Date/Time    CHOLSTRLTOT 141 02/14/2023 04:47 AM    CHOLSTRLTOT 161 04/02/2019 07:14 AM    LDL 87 03/13/2020 04:43 AM    LDL 84 04/02/2019 07:14 AM    HDL 53 02/14/2023 04:47 AM    HDL 45 04/02/2019 07:14 AM    TRIGLYCERIDE 54 02/14/2023 04:47 AM    TRIGLYCERIDE 158 (H) 04/02/2019 07:14 AM         Currently Rx Statin: Yes     He  reports that he has never smoked. He has never used smokeless tobacco.      Plan:     Discussed and educated on:   - All medications, side  effects and compliance (discussed carefully)  - Annual eye examinations at Ophthalmology  - Home glucose monitoring emphasized  - Weight control and daily exercise    Recommended medication changes: He will go get a Zamzeeon meter and strips to start testing his blood sugars at different times.  His Testosterone is low but he is not interested in going on Testosterone at this time but would like it rechecked.  Follow in 6 months with Dr. Moreno.

## 2023-03-03 ENCOUNTER — NON-PROVIDER VISIT (OUTPATIENT)
Dept: WOUND CARE | Facility: MEDICAL CENTER | Age: 63
End: 2023-03-03
Attending: EMERGENCY MEDICINE
Payer: COMMERCIAL

## 2023-03-03 PROCEDURE — 97597 DBRDMT OPN WND 1ST 20 CM/<: CPT

## 2023-03-03 NOTE — PATIENT INSTRUCTIONS
-Keep your wound dressing clean, dry, and intact.    -Change your dressing if it becomes soiled, soaked, or falls off.    - Resolved wound be fragile for a few days, bathe and dry area gently, only ever regains a maximum of 80% of the tensile strength of the surrounding skin, remodeling of scar can continue for 6mo - a year. Contact PCP for a referral back her if any problems with area opening and draining again.    -Should you experience any significant changes in your wound(s), such as infection (redness, swelling, localized heat, increased pain, fever > 101 F, chills) or have any questions regarding your home care instructions, please contact the wound center at [phone]. If after hours, contact your primary care physician or go to the hospital emergency room.

## 2023-03-03 NOTE — PROGRESS NOTES
2% viscous lidocaine applied topically to wound bed for approximately 5 minutes prior to debridement. CSWD with curette to debride wound bed and periwound of non-viable tissue. Entire surface of wound, < 20 cm2 debrided. Refer to flow sheet for wound care details. Patient tolerated the procedure well.

## 2023-07-06 DIAGNOSIS — E11.9 CONTROLLED TYPE 2 DIABETES MELLITUS WITHOUT COMPLICATION, WITHOUT LONG-TERM CURRENT USE OF INSULIN (HCC): ICD-10-CM

## 2023-07-06 RX ORDER — DULAGLUTIDE 3 MG/.5ML
INJECTION, SOLUTION SUBCUTANEOUS
Qty: 12 ML | Refills: 0 | Status: SHIPPED | OUTPATIENT
Start: 2023-07-06 | End: 2023-08-24

## 2023-08-21 DIAGNOSIS — E11.69 DYSLIPIDEMIA ASSOCIATED WITH TYPE 2 DIABETES MELLITUS (HCC): ICD-10-CM

## 2023-08-21 DIAGNOSIS — E78.5 DYSLIPIDEMIA ASSOCIATED WITH TYPE 2 DIABETES MELLITUS (HCC): ICD-10-CM

## 2023-08-21 DIAGNOSIS — E11.9 CONTROLLED TYPE 2 DIABETES MELLITUS WITHOUT COMPLICATION, WITHOUT LONG-TERM CURRENT USE OF INSULIN (HCC): ICD-10-CM

## 2023-08-21 DIAGNOSIS — E78.5 DYSLIPIDEMIA: ICD-10-CM

## 2023-08-21 DIAGNOSIS — E55.9 VITAMIN D DEFICIENCY: ICD-10-CM

## 2023-08-21 DIAGNOSIS — Z79.84 LONG TERM (CURRENT) USE OF ORAL HYPOGLYCEMIC DRUGS: ICD-10-CM

## 2023-08-24 ENCOUNTER — OFFICE VISIT (OUTPATIENT)
Dept: ENDOCRINOLOGY | Facility: MEDICAL CENTER | Age: 63
End: 2023-08-24
Attending: INTERNAL MEDICINE
Payer: COMMERCIAL

## 2023-08-24 VITALS
BODY MASS INDEX: 27.3 KG/M2 | DIASTOLIC BLOOD PRESSURE: 70 MMHG | SYSTOLIC BLOOD PRESSURE: 122 MMHG | OXYGEN SATURATION: 98 % | HEART RATE: 80 BPM | WEIGHT: 195 LBS | HEIGHT: 71 IN

## 2023-08-24 DIAGNOSIS — E55.9 VITAMIN D DEFICIENCY: ICD-10-CM

## 2023-08-24 DIAGNOSIS — E11.65 TYPE 2 DIABETES MELLITUS WITH HYPERGLYCEMIA, WITHOUT LONG-TERM CURRENT USE OF INSULIN (HCC): ICD-10-CM

## 2023-08-24 DIAGNOSIS — R79.89 LOW TESTOSTERONE IN MALE: ICD-10-CM

## 2023-08-24 DIAGNOSIS — E78.5 DYSLIPIDEMIA: ICD-10-CM

## 2023-08-24 DIAGNOSIS — Z79.84 LONG TERM (CURRENT) USE OF ORAL HYPOGLYCEMIC DRUGS: ICD-10-CM

## 2023-08-24 PROBLEM — E29.1 HYPOGONADISM IN MALE: Status: ACTIVE | Noted: 2023-08-24

## 2023-08-24 PROBLEM — E29.1 HYPOGONADISM IN MALE: Status: RESOLVED | Noted: 2023-08-24 | Resolved: 2023-08-24

## 2023-08-24 LAB
25(OH)D3+25(OH)D2 SERPL-MCNC: 57.5 NG/ML (ref 30–100)
ALBUMIN SERPL-MCNC: 4.7 G/DL (ref 3.9–4.9)
ALBUMIN/CREAT UR: <6 MG/G CREAT (ref 0–29)
ALBUMIN/GLOB SERPL: 2.4 {RATIO} (ref 1.2–2.2)
ALP SERPL-CCNC: 84 IU/L (ref 44–121)
ALT SERPL-CCNC: 31 IU/L (ref 0–44)
AST SERPL-CCNC: 21 IU/L (ref 0–40)
BILIRUB SERPL-MCNC: 0.6 MG/DL (ref 0–1.2)
BUN SERPL-MCNC: 15 MG/DL (ref 8–27)
BUN/CREAT SERPL: 17 (ref 10–24)
CALCIUM SERPL-MCNC: 9.5 MG/DL (ref 8.6–10.2)
CHLORIDE SERPL-SCNC: 102 MMOL/L (ref 96–106)
CHOLEST SERPL-MCNC: 195 MG/DL (ref 100–199)
CO2 SERPL-SCNC: 24 MMOL/L (ref 20–29)
CREAT SERPL-MCNC: 0.89 MG/DL (ref 0.76–1.27)
CREAT UR-MCNC: 52.1 MG/DL
EGFRCR SERPLBLD CKD-EPI 2021: 97 ML/MIN/1.73
GLOBULIN SER CALC-MCNC: 2 G/DL (ref 1.5–4.5)
GLUCOSE SERPL-MCNC: 179 MG/DL (ref 70–99)
HBA1C MFR BLD: 7.8 % (ref ?–5.8)
HDLC SERPL-MCNC: 55 MG/DL
LABORATORY COMMENT REPORT: ABNORMAL
LDLC SERPL CALC-MCNC: 121 MG/DL (ref 0–99)
MICROALBUMIN UR-MCNC: <3 UG/ML
POCT INT CON NEG: NEGATIVE
POCT INT CON POS: POSITIVE
POTASSIUM SERPL-SCNC: 4.7 MMOL/L (ref 3.5–5.2)
PROT SERPL-MCNC: 6.7 G/DL (ref 6–8.5)
SODIUM SERPL-SCNC: 140 MMOL/L (ref 134–144)
T4 FREE SERPL-MCNC: 1.45 NG/DL (ref 0.82–1.77)
TRIGL SERPL-MCNC: 104 MG/DL (ref 0–149)
TSH SERPL DL<=0.005 MIU/L-ACNC: 1.36 UIU/ML (ref 0.45–4.5)
VLDLC SERPL CALC-MCNC: 19 MG/DL (ref 5–40)

## 2023-08-24 PROCEDURE — 3074F SYST BP LT 130 MM HG: CPT | Performed by: INTERNAL MEDICINE

## 2023-08-24 PROCEDURE — 83036 HEMOGLOBIN GLYCOSYLATED A1C: CPT | Performed by: INTERNAL MEDICINE

## 2023-08-24 PROCEDURE — 99215 OFFICE O/P EST HI 40 MIN: CPT | Performed by: INTERNAL MEDICINE

## 2023-08-24 PROCEDURE — 92250 FUNDUS PHOTOGRAPHY W/I&R: CPT | Performed by: INTERNAL MEDICINE

## 2023-08-24 PROCEDURE — 99212 OFFICE O/P EST SF 10 MIN: CPT | Performed by: INTERNAL MEDICINE

## 2023-08-24 PROCEDURE — 3078F DIAST BP <80 MM HG: CPT | Performed by: INTERNAL MEDICINE

## 2023-08-24 RX ORDER — DULAGLUTIDE 4.5 MG/.5ML
4.5 INJECTION, SOLUTION SUBCUTANEOUS
Qty: 6 ML | Refills: 2 | Status: SHIPPED | OUTPATIENT
Start: 2023-08-24

## 2023-08-24 ASSESSMENT — FIBROSIS 4 INDEX: FIB4 SCORE: 1.27

## 2023-08-24 NOTE — PROGRESS NOTES
CHIEF COMPLAINT: Patient is here for follow up of Type 2 Diabetes Mellitus.     HPI:     Hu Santacruz is a 63 y.o. male with Type 2 Diabetes Mellitus here for follow up.    Labs from 8/24/2023 show a1c is 7.8%  Labs from 7/7/2022 show a1c was 6.5%  Labs from 2/18/2022 show a1c was 6.7%  Labs from 10/26/2021 show a1c was 6.5%  Labs from 11/4/2020 show HbA1c was 6.4%        On follow up he is taking   Xigduo XR 5/1000mg twice a day,   Pioglitazone 30mg daily, and   Trulicity 3.0mg weekly      He denies SE with his meds   He had an infection of his right foot but it resolved  We discussed the importance of checking feet daily  He denies new foot sores  He admits to drinking regular soda daily  Admits to not watching his diet closely which explains why his A1c went up      He has hyperlipidemia and CAD  He is taking generic crestor  He reports better compliance  His LDL-C  improved from 76 on 2/2023      He doesn't have diabetic kidney disease  UACR was < 30 on  2/2023      He doesn't have diabetic retinopathy based on his eye exam   10/26/2021  We are getting an eye exam  today       He denies foot sores  He denies numbness and pain and tingling in his feet      Interestingly previous labs on 3/20/2023 showed low morning testosterone levels of 146 with normal LH and FSH levels.  Prolactin was normal at 8.7.  SHBG was 34.6.  He claims that the lab test was drawn at 8 AM.  He denies symptoms of hypogonadism such as decreased libido, gynecomastia, hot flashes,.  He does have a history of ED.        BG Diary:  patient did not bring meter    Weight has been stable    Diabetes Complications   Retinopathy: No known retinopathy.  Last eye exam:   We are getting an eye exam today    Neuropathy: Denies paresthesias or numbness in hands or feet. Denies any foot wounds.  Exercise: Minimal.  Diet: Fair.  Patient's medications, allergies, and social histories were reviewed and updated as appropriate.    ROS:     CONS:     No  fever, no chills   EYES:     No diplopia, no blurry vision   CV:           No chest pain, no palpitations   PULM:     No SOB, no cough, no hemoptysis.   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  2013-04: Calcifying tendinitis of shoulder  2013-04: Bicipital tenosynovitis  2013-04: Rotator cuff (capsule) sprain  2012-04: Adjustment disorder  2012-04: CAD (coronary artery disease), native coronary artery  2012-04: S/P coronary artery stent placement  2012-04: Type 2 diabetes mellitus with dyslipidemia (HCC)  2012-04: Dyslipidemia  2012-04: Overweight  2012-04: Old myocardial infarction      Past Surgical History:  Past Surgical History:   Procedure Laterality Date    MULTIPLE CORONARY ARTERY BYPASS ENDO VEIN HARVEST  12/6/2016    Procedure: MULTIPLE CORONARY ARTERY BYPASS ENDO VEIN HARVEST X3 WITH LAITH;  Surgeon: Juan Pablo Schmitz M.D.;  Location: [facility];  Service:     SHOULDER ARTHROSCOPY W/ ROTATOR CUFF REPAIR  4/2/2013    Performed by Angelique Romero M.D. at [facility]    SHOULDER DECOMPRESSION ARTHROSCOPIC  4/2/2013    Performed by Angelique Romero M.D. at [facility]    JOINT INJECTION DIAGNOSTIC  4/2/2013    Performed by Angelique Romero M.D. at [facility]    CLAVICLE DISTAL EXCISION  4/2/2013    Performed by Angelique Romero M.D. at [facility]    SHOULDER ARTHROSCOPY W/ BICIPITAL TENODESIS REPAIR  4/2/2013    Performed by Angelique Romero M.D. at [facility]     "STENT PLACEMENT  2008    x3    DENTAL EXTRACTION(S)  1997    wisdom teeth    KNEE ARTHROSCOPY  1983    left    ORIF, PATELLA  1980    bilateral    ZZZ CARDIAC CATH          Allergies:  Metoprolol     Social History:  Social History     Tobacco Use    Smoking status: Never    Smokeless tobacco: Never   Vaping Use    Vaping Use: Never used   Substance Use Topics    Alcohol use: Yes     Alcohol/week: 3.0 oz     Types: 6 Cans of beer per week     Comment: 1-2 drinks/mo    Drug use: No     Comment: Former Marijuana usage        Family History:   family history is not on file. He was adopted.      PHYSICAL EXAM:   OBJECTIVE:  Vital signs: /70 (BP Location: Left arm, Patient Position: Sitting, BP Cuff Size: Adult)   Pulse 80   Ht 1.803 m (5' 11\")   Wt 88.5 kg (195 lb)   SpO2 98%   BMI 27.20 kg/m²   GENERAL: Well-developed, well-nourished in no apparent distress.   EYE:  No ocular asymmetry, PERRLA  HENT: Pink, moist mucous membranes.    NECK: No thyromegaly.   CARDIOVASCULAR:  No murmurs  LUNGS: Clear breath sounds  ABDOMEN: Soft, nontender   EXTREMITIES: No clubbing, cyanosis, or edema.   NEUROLOGICAL: No gross focal motor abnormalities   LYMPH: No cervical adenopathy palpated  SKIN: No rashes, lesions.       Labs:  Lab Results   Component Value Date/Time    HBA1C 6.7 (A) 02/28/2023 07:49 AM        Lab Results   Component Value Date/Time    WBC 7.0 02/18/2023 04:40 PM    RBC 4.67 (L) 02/18/2023 04:40 PM    HEMOGLOBIN 15.0 02/18/2023 04:40 PM    MCV 92.1 02/18/2023 04:40 PM    MCH 32.1 02/18/2023 04:40 PM    MCHC 34.9 02/18/2023 04:40 PM    RDW 43.5 02/18/2023 04:40 PM    MPV 8.8 (L) 02/18/2023 04:40 PM       Lab Results   Component Value Date/Time    SODIUM 139 02/18/2023 04:40 PM    POTASSIUM 4.1 02/18/2023 04:40 PM    CHLORIDE 104 02/18/2023 04:40 PM    CO2 23 02/18/2023 04:40 PM    ANION 12.0 02/18/2023 04:40 PM    GLUCOSE 136 (H) 02/18/2023 04:40 PM    BUN 15 02/18/2023 04:40 PM    CREATININE 0.92 " 02/18/2023 04:40 PM    CALCIUM 8.8 02/18/2023 04:40 PM    ASTSGOT 19 02/18/2023 04:40 PM    ALTSGPT 16 02/18/2023 04:40 PM    TBILIRUBIN 0.3 02/18/2023 04:40 PM    ALBUMIN 4.1 02/18/2023 04:40 PM    TOTPROTEIN 6.4 02/18/2023 04:40 PM    GLOBULIN 2.3 02/18/2023 04:40 PM    AGRATIO 1.8 02/18/2023 04:40 PM       Lab Results   Component Value Date/Time    CHOLSTRLTOT 142 11/04/2020 0431    TRIGLYCERIDE 57 11/04/2020 0431    HDL 55 11/04/2020 0431    LDL 87 03/13/2020 0443       Lab Results   Component Value Date/Time    MALBCRT see below 04/02/2019 07:14 AM    MICROALBUR <0.7 04/02/2019 07:14 AM    MICRALB 4.9 02/14/2023 04:47 AM        Lab Results   Component Value Date/Time    TSHULTRASEN 0.990 04/02/2019 0714     No results found for: FREEDIR  No results found for: FREET3  No results found for: THYSTIMIG        ASSESSMENT/PLAN:     1. Type 2 diabetes mellitus with hyperglycemia, without long-term current use of insulin (HCC)  Uncontrolled  He did labs in [facility]  Increase Trulicity to 4.5mg  Continue all other meds  Stop soda  Point-of-care eye exam completed today  See Catina in 3 mos      2. Dyslipidemia  Controlled  Continue crestor     3. Vitamin D deficiency  Awaiting labs from [facility]    4. Low testosterone in male  Uncontrolled  Patient is not sure if he has sx of hypogonadism   He is unsure of ART  Discussed that we need to repeat testosterone levels again in 3 months Fasting 8 am labs to confirm diagnosis  If persistently severely low like prior labs NEEDS MRI pituitary to rule out adenoma     5. Long term (current) use of oral hypoglycemic drugs  Patient is oral agents for t2dm      Return in about 3 months (around 11/24/2023).    Total time spent on day of service was over 60 minutes which included obtaining a detailed history and physical exam, ordering labs, coordinating care and scheduling future follow-up      Thank you kindly for allowing me to participate in the diabetes care plan for this  patient.    Tone Moreno MD, FACE, [facility]      CC:   Jen Ortiz P.A.-C.

## 2023-08-31 LAB — RETINAL SCREEN: NEGATIVE

## 2023-09-07 ENCOUNTER — TELEPHONE (OUTPATIENT)
Dept: HEALTH INFORMATION MANAGEMENT | Facility: OTHER | Age: 63
End: 2023-09-07

## 2023-09-18 ENCOUNTER — OFFICE VISIT (OUTPATIENT)
Dept: MEDICAL GROUP | Facility: LAB | Age: 63
End: 2023-09-18
Payer: COMMERCIAL

## 2023-09-18 VITALS
TEMPERATURE: 97.1 F | WEIGHT: 196.65 LBS | DIASTOLIC BLOOD PRESSURE: 60 MMHG | HEIGHT: 71 IN | RESPIRATION RATE: 16 BRPM | HEART RATE: 66 BPM | SYSTOLIC BLOOD PRESSURE: 122 MMHG | OXYGEN SATURATION: 98 % | BODY MASS INDEX: 27.53 KG/M2

## 2023-09-18 DIAGNOSIS — Z76.89 ENCOUNTER TO ESTABLISH CARE: ICD-10-CM

## 2023-09-18 DIAGNOSIS — Z23 NEED FOR VACCINATION: ICD-10-CM

## 2023-09-18 DIAGNOSIS — J34.89 SINUS DRAINAGE: ICD-10-CM

## 2023-09-18 DIAGNOSIS — Z12.5 PROSTATE CANCER SCREENING: ICD-10-CM

## 2023-09-18 PROCEDURE — 3074F SYST BP LT 130 MM HG: CPT | Performed by: PHYSICIAN ASSISTANT

## 2023-09-18 PROCEDURE — 99213 OFFICE O/P EST LOW 20 MIN: CPT | Mod: 25 | Performed by: PHYSICIAN ASSISTANT

## 2023-09-18 PROCEDURE — 3078F DIAST BP <80 MM HG: CPT | Performed by: PHYSICIAN ASSISTANT

## 2023-09-18 PROCEDURE — 90686 IIV4 VACC NO PRSV 0.5 ML IM: CPT | Performed by: PHYSICIAN ASSISTANT

## 2023-09-18 PROCEDURE — 90471 IMMUNIZATION ADMIN: CPT | Performed by: PHYSICIAN ASSISTANT

## 2023-09-18 RX ORDER — ZOSTER VACCINE RECOMBINANT, ADJUVANTED 50 MCG/0.5
0.5 KIT INTRAMUSCULAR ONCE
Qty: 1 EACH | Refills: 0 | Status: SHIPPED
Start: 2023-09-18 | End: 2023-09-18

## 2023-09-18 RX ORDER — ZOSTER VACCINE RECOMBINANT, ADJUVANTED 50 MCG/0.5
0.5 KIT INTRAMUSCULAR ONCE
Qty: 1 EACH | Refills: 0 | Status: SHIPPED | OUTPATIENT
Start: 2023-09-18 | End: 2023-09-18

## 2023-09-18 ASSESSMENT — FIBROSIS 4 INDEX: FIB4 SCORE: 1.01

## 2023-09-18 NOTE — PROGRESS NOTES
"Subjective:     CC:  Diagnoses of Encounter to establish care, Sinus drainage, Prostate cancer screening, and Need for vaccination were pertinent to this visit.    HISTORY OF THE PRESENT ILLNESS: Patient is a 63 y.o. male. This pleasant patient is here today to establish care and discuss cough/cold symptoms. His/her prior PCP was Jen Ortiz PA-C.    URI symptoms  -1.5 weeks sinus congestion, drainage, occ cough, sore throat  -now improving   Denies fever, N/V/D   OTC: none  Does endorse sick contact   At home covid test: No     Type 2 diabetes  Follows with endocrinology    Heart Disease  Follows with cardiology     Health Maintenance     - Dyslipidemia (30-45): UTD  - Diabetes (HTN, HLD, BMI >25): UTD  - Depression screening (PHQ-2 and/or PHQ-9): neg  - Dental: UTD  - Eye: due   Diet: occ follows diabetic diet  Exercise: works construction, moderate  Sleep: \"like a rock\"  Substance Use: denies  Tobacco Use/counseling: denies       Cancer screening  - Colon CA (45-75) - FIT (annual) cspy (q10yr): UTD, due 2024  - Prostate Cancer Screening/PSA: ordered  - Skin cancer screening: follows with dermatology     Infectious disease screening/Immunizations  --Hepatitis C Screening (18 to 80 yo): neg  --Immunizations: due for influenza, shingles Covid               Influenza: givent today   Covid-19: declines further vaccine              Shingles: rx given    Current Outpatient Medications Ordered in Epic   Medication Sig Dispense Refill    Zoster Vac Recomb Adjuvanted (SHINGRIX) 50 MCG/0.5ML Recon Susp Inject 0.5 mL into the shoulder, thigh, or buttocks one time for 1 dose. 1 Each 0    Dulaglutide (TRULICITY) 4.5 MG/0.5ML Solution Pen-injector Inject 4.5 mg under the skin every 7 days. 2.0ml equals 4 pens per month 6 mL 2    pioglitazone (ACTOS) 30 MG Tab Take 1 Tablet by mouth every day. 90 Tablet 3    rosuvastatin (CRESTOR) 20 MG Tab Take 1 Tablet by mouth at bedtime. 90 Tablet 3    XIGDUO XR 5-1000 MG TABLET SR 24 HR " "Take 1 Tablet by mouth 2 times a day. 180 Tablet 3    sildenafil citrate (VIAGRA) 100 MG tablet TAKE ONE TABLET BY MOUTH DAILY AS NEEDED FOR ERECTILE DSYFUNCTION 30 Tablet 2    aspirin EC (ECOTRIN) 81 MG Tablet Delayed Response Take 81 mg by mouth every day.      vitamin D (CHOLECALCIFEROL) 1000 UNIT TABS Take 1,000 Units by mouth 2 times a day.         No current Epic-ordered facility-administered medications on file.         ROS:   Gen: no fevers/chills, no changes in weight  Eyes: no changes in vision  ENT: no sore throat, no hearing loss, no bloody nose  Pulm: no sob, no cough  CV: no chest pain, no palpitations  GI: no nausea/vomiting, no diarrhea  : no dysuria  MSk: no myalgias  Skin: no rash  Neuro: no headaches, no numbness/tingling  Heme/Lymph: no easy bruising      Objective:       Exam: /60 (BP Location: Right arm, Patient Position: Sitting, BP Cuff Size: Adult)   Pulse 66   Temp 36.2 °C (97.1 °F) (Temporal)   Resp 16   Ht 1.803 m (5' 11\")   Wt 89.2 kg (196 lb 10.4 oz)   SpO2 98%  Body mass index is 27.43 kg/m².    General: Normal appearing. No distress.  HEENT: Normocephalic. Eyes conjunctiva clear lids without ptosis, pupils equal and reactive to light accommodation, ears normal shape and contour, canals are clear bilaterally, tympanic membranes are benign, nasal mucosa benign, oropharynx is without erythema, edema or exudates.   Neck: Supple without JVD or bruit. Thyroid is not enlarged.  Pulmonary: Clear to ausculation.  Normal effort. No rales, ronchi, or wheezing.  Cardiovascular: Regular rate and rhythm without murmur. Carotid and radial pulses are intact and equal bilaterally.  Abdomen: Soft, nontender, nondistended. Normal bowel sounds. Liver and spleen are not palpable  Neurologic: Grossly nonfocal  Lymph: No cervical or supraclavicular lymph nodes are palpable  Skin: Warm and dry.  No obvious lesions.  Musculoskeletal: Normal gait. No extremity cyanosis, clubbing, or " edema.  Psych: Normal mood and affect. Alert and oriented x3. Judgment and insight is normal.      Assessment & Plan:   63 y.o. male with the following -    1. Encounter to establish care  Labs per orders  Vaccinations per orders  Screenings per orders      2. Sinus drainage  Acute condition, resolving    Advised trial of over-the-counter antihistamines, nasal steroid spray.  If no improvement with conservative measures follow-up in clinic for reassessment and possible antibiotics.  Discussed red flags indications for close follow-up    3.. Prostate cancer screening  - PROSTATE SPECIFIC AG SCREENING; Future    4. Need for vaccination  - Influenza Vaccine Quad Injection (PF)  - Zoster Vac Recomb Adjuvanted (SHINGRIX) 50 MCG/0.5ML Recon Susp; Inject 0.5 mL into the shoulder, thigh, or buttocks one time for 1 dose.  Dispense: 1 Each; Refill: 0      I spent a total of 22 minutes with record review, exam, communication with the patient, communication with other providers, and documentation of this encounter.    Return in about 6 months (around 3/18/2024).    Please note that this dictation was created using voice recognition software. I have made every reasonable attempt to correct obvious errors, but I expect that there are errors of grammar and possibly content that I did not discover before finalizing the note.

## 2023-11-20 DIAGNOSIS — N52.9 ERECTILE DYSFUNCTION, UNSPECIFIED ERECTILE DYSFUNCTION TYPE: ICD-10-CM

## 2023-11-20 RX ORDER — SILDENAFIL 100 MG/1
TABLET, FILM COATED ORAL
Qty: 30 TABLET | Refills: 2 | Status: SHIPPED | OUTPATIENT
Start: 2023-11-20

## 2023-11-21 NOTE — PROGRESS NOTES
Chief Complaint   Patient presents with    Coronary Artery Disease     Follow up          Subjective:   Hu Santacruz is a 63 y.o. male who presents today for follow-up.    Patient of Dr. Faye.  Current medical problems include coronary artery disease s/p CABG (LIMA-LAD, reverse SVG-D2 and PDA), type 2 diabetes, dyslipidemia.  Last visit with Dr. Faye July 2022.  Echocardiogram and nuclear stress test were ordered.  His rosuvastatin was increased to 20mg however he admits to poor med adherence. His most recent lipid profile in Aug 2023 showed LDL of 120.    Hu continues to feel well.  He does not have a normal exercise routine but stays active at work.  He does not have any exertional angina symptoms.  He continues to work on his glucose control with Dr. Gonsalves's team.    He is using hyaluronic acid supplementation and feels like this is helping his joints.      Past Medical History:   Diagnosis Date    Adjustment disorder 4/16/2012    Back pain     CAD (coronary artery disease), native coronary artery 4/16/2012    CABG X3 (LIMA-LAD, SVG-2nd diag and R-PDA)-12/2016    Cold 2/21/13    DM (diabetes mellitus) ([facility]) 2008    oral agent    Hyperlipidemia 4/16/2012    Muscle disorder     Old myocardial infarction 2008    cardiologist; Dr. Olmstead    Pain     right shoulder    Situational anxiety 10/11/2016    Snoring          Family History   Adopted: Yes   Problem Relation Age of Onset    Heart Attack Neg Hx     Heart Disease Neg Hx          Social History     Tobacco Use    Smoking status: Never    Smokeless tobacco: Never   Vaping Use    Vaping Use: Never used   Substance Use Topics    Alcohol use: Not Currently     Comment: 1-2 drinks/mo    Drug use: No     Comment: Former Marijuana usage         Allergies   Allergen Reactions    Metoprolol          Current Outpatient Medications   Medication Sig    rosuvastatin (CRESTOR) 40 MG tablet Take 1 Tablet by mouth at bedtime.    sildenafil citrate (VIAGRA) 100 MG  "tablet TAKE ONE TABLET BY MOUTH DAILY AS NEEDED FOR ERECTILE DYSFUNCTION    Dulaglutide (TRULICITY) 4.5 MG/0.5ML Solution Pen-injector Inject 4.5 mg under the skin every 7 days. 2.0ml equals 4 pens per month    pioglitazone (ACTOS) 30 MG Tab Take 1 Tablet by mouth every day.    XIGDUO XR 5-1000 MG TABLET SR 24 HR Take 1 Tablet by mouth 2 times a day.    aspirin EC (ECOTRIN) 81 MG Tablet Delayed Response Take 81 mg by mouth every day.    vitamin D (CHOLECALCIFEROL) 1000 UNIT TABS Take 1,000 Units by mouth 2 times a day.           Review of Systems   Constitutional:  Negative for malaise/fatigue.   Respiratory:  Negative for cough and shortness of breath.    Cardiovascular:  Negative for chest pain, palpitations, orthopnea, leg swelling and PND.   Gastrointestinal:  Negative for nausea.   Musculoskeletal:  Positive for joint pain.   Neurological:  Negative for dizziness and loss of consciousness.          Objective:   /88 (BP Location: Left arm, Patient Position: Sitting)   Pulse 80   Resp 16   Ht 1.803 m (5' 11\")   Wt 87.1 kg (192 lb)   SpO2 97%  Body mass index is 26.78 kg/m².         Physical Exam  Vitals reviewed.   HENT:      Head: Normocephalic and atraumatic.   Cardiovascular:      Rate and Rhythm: Normal rate and regular rhythm.      Heart sounds: No murmur heard.  Pulmonary:      Effort: Pulmonary effort is normal. No respiratory distress.      Breath sounds: No rales.   Abdominal:      General: There is no distension.   Musculoskeletal:      Right lower leg: No edema.      Left lower leg: No edema.   Skin:     General: Skin is warm.   Neurological:      General: No focal deficit present.      Mental Status: He is alert.      Gait: Gait normal.   Psychiatric:         Judgment: Judgment normal.            Assessment:     1. Dyslipidemia associated with type 2 diabetes mellitus (HCC)  rosuvastatin (CRESTOR) 40 MG tablet      2. Coronary artery disease involving native coronary artery of native heart " without angina pectoris  rosuvastatin (CRESTOR) 40 MG tablet      3. S/P CABG (coronary artery bypass graft)        4. Type 2 diabetes mellitus with hyperglycemia, without long-term current use of insulin (HCC)        5. Dyslipidemia        6. Ischemic cardiomyopathy  EC-ECHOCARDIOGRAM COMPLETE W/O CONT           Medical Decision Making:  Today's Assessment / Plan:   Ischemic Heart Disease without angina  Dyslipidemia  - S/P CABG (LIMA-LAD, reverse SVG-D2 and PDA) 2016  - cont aspirin  -He has never had good control of his LDL, I like him to increase the Crestor to 40 mg.  He can add co-Q10 if he is concerned about myalgias.    DM type 2  -Continues to work with his endocrinology team.  He is not on an ACE inhibitor however looking at his blood pressures over the last year they have been very well-controlled, generally less than 120/80, all less than 130/80 therefore I will not add on an ACE/ARB.    Ischemic cardiomyopathy  - no HF  -His EF was about 45% at the time of his bypass surgery.  He has not had a repeat ultrasound since then.  I would like him to get a new baseline, if his EF remains mildly reduced I would add on an ACE/ARB and beta-blocker.      Return in about 1 year (around 11/22/2024) for follow up with me.

## 2023-11-22 ENCOUNTER — OFFICE VISIT (OUTPATIENT)
Dept: CARDIOLOGY | Facility: MEDICAL CENTER | Age: 63
End: 2023-11-22
Attending: PHYSICIAN ASSISTANT
Payer: COMMERCIAL

## 2023-11-22 VITALS
HEIGHT: 71 IN | DIASTOLIC BLOOD PRESSURE: 88 MMHG | SYSTOLIC BLOOD PRESSURE: 110 MMHG | WEIGHT: 192 LBS | BODY MASS INDEX: 26.88 KG/M2 | RESPIRATION RATE: 16 BRPM | OXYGEN SATURATION: 97 % | HEART RATE: 80 BPM

## 2023-11-22 DIAGNOSIS — E78.5 DYSLIPIDEMIA ASSOCIATED WITH TYPE 2 DIABETES MELLITUS (HCC): ICD-10-CM

## 2023-11-22 DIAGNOSIS — I25.5 ISCHEMIC CARDIOMYOPATHY: ICD-10-CM

## 2023-11-22 DIAGNOSIS — E11.69 DYSLIPIDEMIA ASSOCIATED WITH TYPE 2 DIABETES MELLITUS (HCC): ICD-10-CM

## 2023-11-22 DIAGNOSIS — I25.10 CORONARY ARTERY DISEASE INVOLVING NATIVE CORONARY ARTERY OF NATIVE HEART WITHOUT ANGINA PECTORIS: Chronic | ICD-10-CM

## 2023-11-22 DIAGNOSIS — E78.5 DYSLIPIDEMIA: ICD-10-CM

## 2023-11-22 DIAGNOSIS — E11.65 TYPE 2 DIABETES MELLITUS WITH HYPERGLYCEMIA, WITHOUT LONG-TERM CURRENT USE OF INSULIN (HCC): ICD-10-CM

## 2023-11-22 DIAGNOSIS — Z95.1 S/P CABG (CORONARY ARTERY BYPASS GRAFT): ICD-10-CM

## 2023-11-22 PROCEDURE — 3079F DIAST BP 80-89 MM HG: CPT | Performed by: PHYSICIAN ASSISTANT

## 2023-11-22 PROCEDURE — 3074F SYST BP LT 130 MM HG: CPT | Performed by: PHYSICIAN ASSISTANT

## 2023-11-22 PROCEDURE — 99212 OFFICE O/P EST SF 10 MIN: CPT | Performed by: PHYSICIAN ASSISTANT

## 2023-11-22 PROCEDURE — 99214 OFFICE O/P EST MOD 30 MIN: CPT | Performed by: PHYSICIAN ASSISTANT

## 2023-11-22 RX ORDER — ROSUVASTATIN CALCIUM 40 MG/1
40 TABLET, COATED ORAL
Qty: 90 TABLET | Refills: 3 | Status: SHIPPED | OUTPATIENT
Start: 2023-11-22

## 2023-11-22 ASSESSMENT — FIBROSIS 4 INDEX: FIB4 SCORE: 1.01

## 2023-11-22 ASSESSMENT — ENCOUNTER SYMPTOMS
ORTHOPNEA: 0
PALPITATIONS: 0
LOSS OF CONSCIOUSNESS: 0
NAUSEA: 0
COUGH: 0
SHORTNESS OF BREATH: 0
DIZZINESS: 0
PND: 0

## 2023-11-27 ENCOUNTER — NON-PROVIDER VISIT (OUTPATIENT)
Dept: ENDOCRINOLOGY | Facility: MEDICAL CENTER | Age: 63
End: 2023-11-27
Attending: INTERNAL MEDICINE
Payer: COMMERCIAL

## 2023-11-27 VITALS
HEART RATE: 76 BPM | WEIGHT: 196 LBS | SYSTOLIC BLOOD PRESSURE: 110 MMHG | HEIGHT: 71 IN | BODY MASS INDEX: 27.44 KG/M2 | OXYGEN SATURATION: 95 % | DIASTOLIC BLOOD PRESSURE: 60 MMHG

## 2023-11-27 DIAGNOSIS — E55.9 VITAMIN D DEFICIENCY: ICD-10-CM

## 2023-11-27 DIAGNOSIS — E78.5 DYSLIPIDEMIA: ICD-10-CM

## 2023-11-27 DIAGNOSIS — E11.65 TYPE 2 DIABETES MELLITUS WITH HYPERGLYCEMIA, WITHOUT LONG-TERM CURRENT USE OF INSULIN (HCC): ICD-10-CM

## 2023-11-27 LAB
HBA1C MFR BLD: 7.2 % (ref ?–5.8)
POCT INT CON NEG: NEGATIVE
POCT INT CON POS: POSITIVE

## 2023-11-27 PROCEDURE — 99212 OFFICE O/P EST SF 10 MIN: CPT | Performed by: INTERNAL MEDICINE

## 2023-11-27 PROCEDURE — 83036 HEMOGLOBIN GLYCOSYLATED A1C: CPT

## 2023-11-27 ASSESSMENT — FIBROSIS 4 INDEX: FIB4 SCORE: 1.01

## 2023-11-27 NOTE — PROGRESS NOTES
RN-CDE Note    Subjective:   Endocrinology Clinic Progress Note  PCP: Kristen Mcgovern P.A.-C.    HPI:  Hu Santacruz is a 63 y.o. old patient who is seen today by the Diabetes Nurse Specialist for review of Type 2 Diabetes.  Recent changes in health: Health good.  DM:   Last A1c:   Lab Results   Component Value Date/Time    HBA1C 7.2 (A) 11/27/2023 07:30 AM      Previous A1c was 7.8 on 8/24/23  A1C GOAL: < 7    Diabetes Medications:   Xigduo 5-1000 mg daily  Trulicity 4.5 mg weekly  Actos 30 mg daily      Exercise: active at work  Diet: Skips breakfast.  Light lunch.  Dinner is protein, vegetables, and carbohydrates.  Drinking diet soda.  Patient's body mass index is 27.34 kg/m². Exercise and nutrition counseling were performed at this visit.    Glucose monitoring frequency: Not testing    Hypoglycemic episodes: no  Last Retinal Exam:  Had at [facility] last week  Daily Foot Exam: Yes   Foot Exam:  Monofilament: done  Monofilament testing with a 10 gram force: sensation intact: intact bilaterally  Visual Inspection: Feet without maceration, ulcers, fissures.  Pedal pulses: intact bilaterally   Lab Results   Component Value Date/Time    MALBCRT see below 04/02/2019 07:14 AM    MICROALBUR <0.7 04/02/2019 07:14 AM    MICRALB <3.0 08/23/2023 04:49 AM        ACR Albumin/Creatinine Ratio goal <30     HTN:   Blood pressure goal <130/<80 .   Currently Rx ACE/ARB: No     Dyslipidemia:    Lab Results   Component Value Date/Time    CHOLSTRLTOT 195 08/23/2023 04:49 AM    CHOLSTRLTOT 161 04/02/2019 07:14 AM    LDL 87 03/13/2020 04:43 AM    LDL 84 04/02/2019 07:14 AM    HDL 55 08/23/2023 04:49 AM    HDL 45 04/02/2019 07:14 AM    TRIGLYCERIDE 104 08/23/2023 04:49 AM    TRIGLYCERIDE 158 (H) 04/02/2019 07:14 AM         Currently Rx Statin: Yes recently increased Crestor by cardiologist    He  reports that he has never smoked. He has never used smokeless tobacco.      Plan:     Discussed and educated on:   - All  medications, side effects and compliance (discussed carefully)  - Annual eye examinations at Ophthalmology  - Home glucose monitoring emphasized  - Weight control and daily exercise    Recommended medication changes: Increase Xigduo 5-1000 mg 2 daily.   He is tired after dinner.  He will try decreasing his carbohydrates and sweets.  He will follow up with Dr. Moreno in 6 months.

## 2024-03-18 ENCOUNTER — APPOINTMENT (OUTPATIENT)
Dept: MEDICAL GROUP | Facility: LAB | Age: 64
End: 2024-03-18
Payer: COMMERCIAL

## 2024-03-25 ENCOUNTER — APPOINTMENT (OUTPATIENT)
Dept: CARDIOLOGY | Facility: MEDICAL CENTER | Age: 64
End: 2024-03-25
Attending: PHYSICIAN ASSISTANT
Payer: COMMERCIAL

## 2024-04-28 DIAGNOSIS — E11.65 TYPE 2 DIABETES MELLITUS WITH HYPERGLYCEMIA, WITHOUT LONG-TERM CURRENT USE OF INSULIN (HCC): ICD-10-CM

## 2024-04-28 RX ORDER — DULAGLUTIDE 4.5 MG/.5ML
INJECTION, SOLUTION SUBCUTANEOUS
Qty: 12 ML | Refills: 0 | Status: SHIPPED | OUTPATIENT
Start: 2024-04-28

## 2024-05-20 DIAGNOSIS — N52.9 ERECTILE DYSFUNCTION, UNSPECIFIED ERECTILE DYSFUNCTION TYPE: ICD-10-CM

## 2024-05-20 RX ORDER — SILDENAFIL 100 MG/1
TABLET, FILM COATED ORAL
Qty: 30 TABLET | Refills: 2 | Status: SHIPPED | OUTPATIENT
Start: 2024-05-20

## 2024-05-29 NOTE — PROGRESS NOTES
RN-CDE Note    Subjective:   Endocrinology Clinic Progress Note  PCP: Kristen Mcgovern P.A.-C.    HPI:  Hu Santacruz is a 64 y.o. old patient who is seen today by the Diabetes Nurse Specialist for review of his type 2 diabetes.    Recent changes in health: denies any changes,.   DM:   Last A1c:   Lab Results   Component Value Date/Time    HBA1C 6.2 (A) 05/30/2024 08:27 AM      Previous A1c was 7.2 on 11/27/2023  A1C GOAL: < 7    Diabetes Medications:   Xigduo 5/1000 mg bid  Trulicity 4.5 mg weekly  Pioglitazone 30 mg daily      Exercise: moderate regular exercise, aerobic < 3 days a week  Diet: healthier.  Has cut back on drinking soda.   Patient's body mass index is 25.24 kg/m². Exercise and nutrition counseling were performed at this visit.    Glucose monitoring frequency: not testing    Hypoglycemic episodes: no  Last Retinal Exam: on file and up-to-date  Foot Exam:  Monofilament: current.   Lab Results   Component Value Date/Time    MALBCRT see below 04/02/2019 07:14 AM    MICROALBUR <0.7 04/02/2019 07:14 AM    MICRALB <3.0 08/23/2023 04:49 AM        ACR Albumin/Creatinine Ratio goal <30     HTN:   Blood pressure goal <130/<80 .   Currently Rx ACE/ARB: Not Indicated     Dyslipidemia:    Lab Results   Component Value Date/Time    CHOLSTRLTOT 195 08/23/2023 04:49 AM    CHOLSTRLTOT 161 04/02/2019 07:14 AM    LDL 87 03/13/2020 04:43 AM    LDL 84 04/02/2019 07:14 AM    HDL 55 08/23/2023 04:49 AM    HDL 45 04/02/2019 07:14 AM    TRIGLYCERIDE 104 08/23/2023 04:49 AM    TRIGLYCERIDE 158 (H) 04/02/2019 07:14 AM         Currently Rx Statin: Yes     He  reports that he has never smoked. He has never used smokeless tobacco.      Plan:     Discussed and educated on:   - All medications, side effects and compliance (discussed carefully)  - HbA1C:   - Home glucose monitoring emphasized  - Weight control and daily exercise    Recommended medication changes: no changes.

## 2024-05-30 ENCOUNTER — OFFICE VISIT (OUTPATIENT)
Dept: ENDOCRINOLOGY | Facility: MEDICAL CENTER | Age: 64
End: 2024-05-30
Attending: INTERNAL MEDICINE
Payer: COMMERCIAL

## 2024-05-30 VITALS
SYSTOLIC BLOOD PRESSURE: 118 MMHG | BODY MASS INDEX: 25.34 KG/M2 | RESPIRATION RATE: 18 BRPM | OXYGEN SATURATION: 97 % | HEART RATE: 70 BPM | HEIGHT: 71 IN | DIASTOLIC BLOOD PRESSURE: 66 MMHG | WEIGHT: 181 LBS

## 2024-05-30 DIAGNOSIS — E78.5 DYSLIPIDEMIA: ICD-10-CM

## 2024-05-30 DIAGNOSIS — E55.9 VITAMIN D DEFICIENCY: ICD-10-CM

## 2024-05-30 DIAGNOSIS — E11.65 TYPE 2 DIABETES MELLITUS WITH HYPERGLYCEMIA, WITHOUT LONG-TERM CURRENT USE OF INSULIN (HCC): ICD-10-CM

## 2024-05-30 DIAGNOSIS — R79.89 LOW TESTOSTERONE IN MALE: ICD-10-CM

## 2024-05-30 DIAGNOSIS — N52.9 ERECTILE DYSFUNCTION, UNSPECIFIED ERECTILE DYSFUNCTION TYPE: ICD-10-CM

## 2024-05-30 DIAGNOSIS — Z79.84 LONG TERM (CURRENT) USE OF ORAL HYPOGLYCEMIC DRUGS: ICD-10-CM

## 2024-05-30 LAB
HBA1C MFR BLD: 6.2 % (ref ?–5.8)
POCT INT CON NEG: NEGATIVE
POCT INT CON POS: POSITIVE

## 2024-05-30 ASSESSMENT — FIBROSIS 4 INDEX: FIB4 SCORE: 1.02

## 2024-05-30 NOTE — PROGRESS NOTES
CHIEF COMPLAINT: Patient is here for follow up of Type 2 Diabetes Mellitus.     HPI:     Hu Santacruz is a 64 y.o. male with Type 2 Diabetes Mellitus here for follow up.    Labs from 5/30/2024 show a1c is 6.2%  Labs from 11/27/2023 show a1c was 7.2%  Labs from 8/24/2023 show a1c was 7.8%  Labs from 7/7/2022 show a1c was 6.5%  Labs from 2/18/2022 show a1c was 6.7%  Labs from 10/26/2021 show a1c was 6.5%  Labs from 11/4/2020 show a1c was 6.4%        On follow up he is taking   Xigduo XR 5/1000mg twice a day,   Pioglitazone 30mg daily, and   Trulicity 4.5mg weekly      He denies SE with his meds   He is overdue for labs        He has hyperlipidemia and CAD  He is taking generic crestor  He reports better compliance  His LDL-C was 76 on 2/2023  He is overdue for labs to check his fasting lipids      He doesn't have diabetic kidney disease  UACR was < 30 on  2/2023  He is overdue for labs to check his urine microalbumin    He doesn't have diabetic retinopathy based on his eye exam   On 8/2023      He denies foot sores  He denies numbness and pain and tingling in his feet      Interestingly previous labs on 3/20/2023 showed low morning testosterone levels of 146 with normal LH and FSH levels.  Prolactin was normal at 8.7.  SHBG was 34.6.  He claims that the lab test was drawn at 8 AM.  He denies symptoms of hypogonadism such as decreased libido, gynecomastia, hot flashes,.  He does have a history of ED.      I ordered testosterone labs last time but he failed to complete the repeat labs to check his testosterone levels        BG Diary:  patient did not bring meter    Weight has been stable    Diabetes Complications   Retinopathy: No known retinopathy.  Last eye exam:   We are getting an eye exam today    Neuropathy: Denies paresthesias or numbness in hands or feet. Denies any foot wounds.  Exercise: Minimal.  Diet: Fair.  Patient's medications, allergies, and social histories were reviewed and updated as  appropriate.    ROS:     CONS:     No fever, no chills   EYES:     No diplopia, no blurry vision   CV:           No chest pain, no palpitations   PULM:     No SOB, no cough, no hemoptysis.   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  2013-04: Calcifying tendinitis of shoulder  2013-04: Bicipital tenosynovitis  2013-04: Rotator cuff (capsule) sprain  2012-04: Adjustment disorder  2012-04: CAD (coronary artery disease), native coronary artery  2012-04: S/P coronary artery stent placement  2012-04: Type 2 diabetes mellitus with hyperglycemia, without long-  term current use of insulin ([facility])  2012-04: Dyslipidemia  2012-04: Overweight  2012-04: Old myocardial infarction      Past Surgical History:  Past Surgical History:   Procedure Laterality Date    MULTIPLE CORONARY ARTERY BYPASS ENDO VEIN HARVEST  12/6/2016    Procedure: MULTIPLE CORONARY ARTERY BYPASS ENDO VEIN HARVEST X3 WITH LAITH;  Surgeon: Juan Pablo Schmitz M.D.;  Location: SURGERY [facility];  Service:     SHOULDER ARTHROSCOPY W/ ROTATOR CUFF REPAIR  4/2/2013    Performed by Angelique Romero M.D. at [facility]    SHOULDER DECOMPRESSION ARTHROSCOPIC  4/2/2013    Performed by Angelique Romero M.D. at [facility]    JOINT INJECTION DIAGNOSTIC  4/2/2013    Performed by Angelique Romero M.D. at [facility]    CLAVICLE DISTAL EXCISION  4/2/2013    Performed by Angelique Romero M.D. at [facility]    SHOULDER  "ARTHROSCOPY W/ BICIPITAL TENODESIS REPAIR  4/2/2013    Performed by Angelique Romero M.D. at SURGERY [facility]    STENT PLACEMENT  2008    x3    DENTAL EXTRACTION(S)  1997    wisdom teeth    KNEE ARTHROSCOPY  1983    left    ORIF, PATELLA  1980    bilateral    ZZZ CARDIAC CATH          Allergies:  Metoprolol     Social History:  Social History     Tobacco Use    Smoking status: Never    Smokeless tobacco: Never   Vaping Use    Vaping status: Never Used   Substance Use Topics    Alcohol use: Not Currently     Comment: 1-2 drinks/mo    Drug use: No     Comment: Former Marijuana usage        Family History:   family history is not on file. He was adopted.      PHYSICAL EXAM:   OBJECTIVE:  Vital signs: /66 (BP Location: Right arm, Patient Position: Sitting)   Pulse 70   Resp 18   Ht 1.803 m (5' 11\")   Wt 82.1 kg (181 lb)   SpO2 97%   BMI 25.24 kg/m²   GENERAL: Well-developed, well-nourished in no apparent distress.   EYE:  No ocular asymmetry, PERRLA  HENT: Pink, moist mucous membranes.    NECK: No thyromegaly.   CARDIOVASCULAR:  No murmurs  LUNGS: Clear breath sounds  ABDOMEN: Soft, nontender   EXTREMITIES: No clubbing, cyanosis, or edema.   NEUROLOGICAL: No gross focal motor abnormalities   LYMPH: No cervical adenopathy palpated  SKIN: No rashes, lesions.       Labs:  Lab Results   Component Value Date/Time    HBA1C 6.2 (A) 05/30/2024 08:27 AM        Lab Results   Component Value Date/Time    WBC 7.0 02/18/2023 04:40 PM    RBC 4.67 (L) 02/18/2023 04:40 PM    HEMOGLOBIN 15.0 02/18/2023 04:40 PM    MCV 92.1 02/18/2023 04:40 PM    MCH 32.1 02/18/2023 04:40 PM    MCHC 34.9 02/18/2023 04:40 PM    RDW 43.5 02/18/2023 04:40 PM    MPV 8.8 (L) 02/18/2023 04:40 PM       Lab Results   Component Value Date/Time    SODIUM 140 08/23/2023 04:49 AM    SODIUM 139 02/18/2023 04:40 PM    POTASSIUM 4.7 08/23/2023 04:49 AM    POTASSIUM 4.1 02/18/2023 04:40 PM    CHLORIDE 102 08/23/2023 04:49 AM    CHLORIDE 104 " 02/18/2023 04:40 PM    CO2 24 08/23/2023 04:49 AM    CO2 23 02/18/2023 04:40 PM    ANION 12.0 02/18/2023 04:40 PM    GLUCOSE 179 (H) 08/23/2023 04:49 AM    GLUCOSE 136 (H) 02/18/2023 04:40 PM    BUN 15 08/23/2023 04:49 AM    BUN 15 02/18/2023 04:40 PM    CREATININE 0.89 08/23/2023 04:49 AM    CREATININE 0.92 02/18/2023 04:40 PM    CALCIUM 9.5 08/23/2023 04:49 AM    CALCIUM 8.8 02/18/2023 04:40 PM    ASTSGOT 21 08/23/2023 04:49 AM    ASTSGOT 19 02/18/2023 04:40 PM    ALTSGPT 31 08/23/2023 04:49 AM    ALTSGPT 16 02/18/2023 04:40 PM    TBILIRUBIN 0.6 08/23/2023 04:49 AM    TBILIRUBIN 0.3 02/18/2023 04:40 PM    ALBUMIN 4.7 08/23/2023 04:49 AM    ALBUMIN 4.1 02/18/2023 04:40 PM    TOTPROTEIN 6.7 08/23/2023 04:49 AM    TOTPROTEIN 6.4 02/18/2023 04:40 PM    GLOBULIN 2.0 08/23/2023 04:49 AM    GLOBULIN 2.3 02/18/2023 04:40 PM    AGRATIO 2.4 (H) 08/23/2023 04:49 AM    AGRATIO 1.8 02/18/2023 04:40 PM       Lab Results   Component Value Date/Time    CHOLSTRLTOT 142 11/04/2020 0431    TRIGLYCERIDE 57 11/04/2020 0431    HDL 55 11/04/2020 0431    LDL 87 03/13/2020 0443       Lab Results   Component Value Date/Time    MALBCRT see below 04/02/2019 07:14 AM    MICROALBUR <0.7 04/02/2019 07:14 AM    MICRALB <3.0 08/23/2023 04:49 AM        Lab Results   Component Value Date/Time    TSHULTRASEN 0.990 04/02/2019 0714     No results found for: FREEDIR  No results found for: FREET3  No results found for: THYSTIMIG        ASSESSMENT/PLAN:     1. Type 2 diabetes mellitus with hyperglycemia, without long-term current use of insulin (HCC)  Improved control A1c is down to 6.2%  Continue metformin  Continue Trulicity  Continue Xigduo XR  I want him to get updated labs to check his CMP lipids and urine albumin  Follow-up with Catina in 6 months      2. Dyslipidemia  Controlled  Continue crestor   Repeat fasting lipids this week    3. Vitamin D deficiency  Will check calcium vitamin D levels this week    4. Low testosterone in  male  Uncontrolled  He never completed the previously ordered labs to recheck his testosterone   I want him to get repeat labs to check his LH FSH and morning total testosterone levels  I encouraged him to use [facility]    5. Long term (current) use of oral hypoglycemic drugs  Patient is oral agents for t2dm      Return in about 6 months (around 11/30/2024).        Thank you kindly for allowing me to participate in the diabetes care plan for this patient.    Tone Moreno MD, F[facility], [facility]      CC:   Kristen Mcgovern P.A.-C.

## 2024-06-06 ENCOUNTER — HOSPITAL ENCOUNTER (OUTPATIENT)
Dept: LAB | Facility: MEDICAL CENTER | Age: 64
End: 2024-06-06
Attending: INTERNAL MEDICINE
Payer: COMMERCIAL

## 2024-06-06 DIAGNOSIS — E55.9 VITAMIN D DEFICIENCY: ICD-10-CM

## 2024-06-06 DIAGNOSIS — R79.89 LOW TESTOSTERONE IN MALE: ICD-10-CM

## 2024-06-06 DIAGNOSIS — E78.5 DYSLIPIDEMIA: ICD-10-CM

## 2024-06-06 DIAGNOSIS — N52.9 ERECTILE DYSFUNCTION, UNSPECIFIED ERECTILE DYSFUNCTION TYPE: ICD-10-CM

## 2024-06-06 DIAGNOSIS — E11.65 TYPE 2 DIABETES MELLITUS WITH HYPERGLYCEMIA, WITHOUT LONG-TERM CURRENT USE OF INSULIN (HCC): ICD-10-CM

## 2024-06-06 DIAGNOSIS — Z79.84 LONG TERM (CURRENT) USE OF ORAL HYPOGLYCEMIC DRUGS: ICD-10-CM

## 2024-06-06 LAB
25(OH)D3 SERPL-MCNC: 93 NG/ML (ref 30–100)
T4 FREE SERPL-MCNC: 1.53 NG/DL (ref 0.93–1.7)
TSH SERPL DL<=0.005 MIU/L-ACNC: 1.31 UIU/ML (ref 0.38–5.33)

## 2024-06-06 PROCEDURE — 82570 ASSAY OF URINE CREATININE: CPT

## 2024-06-06 PROCEDURE — 80053 COMPREHEN METABOLIC PANEL: CPT

## 2024-06-06 PROCEDURE — 84403 ASSAY OF TOTAL TESTOSTERONE: CPT

## 2024-06-06 PROCEDURE — 83002 ASSAY OF GONADOTROPIN (LH): CPT

## 2024-06-06 PROCEDURE — 84270 ASSAY OF SEX HORMONE GLOBUL: CPT

## 2024-06-06 PROCEDURE — 84402 ASSAY OF FREE TESTOSTERONE: CPT

## 2024-06-06 PROCEDURE — 80061 LIPID PANEL: CPT

## 2024-06-06 PROCEDURE — 84443 ASSAY THYROID STIM HORMONE: CPT

## 2024-06-06 PROCEDURE — 36415 COLL VENOUS BLD VENIPUNCTURE: CPT

## 2024-06-06 PROCEDURE — 84439 ASSAY OF FREE THYROXINE: CPT

## 2024-06-06 PROCEDURE — 82306 VITAMIN D 25 HYDROXY: CPT

## 2024-06-06 PROCEDURE — 83001 ASSAY OF GONADOTROPIN (FSH): CPT

## 2024-06-06 PROCEDURE — 82043 UR ALBUMIN QUANTITATIVE: CPT

## 2024-06-07 LAB
ALBUMIN SERPL BCP-MCNC: 4.4 G/DL (ref 3.2–4.9)
ALBUMIN/GLOB SERPL: 2 G/DL
ALP SERPL-CCNC: 63 U/L (ref 30–99)
ALT SERPL-CCNC: 25 U/L (ref 2–50)
ANION GAP SERPL CALC-SCNC: 13 MMOL/L (ref 7–16)
AST SERPL-CCNC: 21 U/L (ref 12–45)
BILIRUB SERPL-MCNC: 0.4 MG/DL (ref 0.1–1.5)
BUN SERPL-MCNC: 15 MG/DL (ref 8–22)
CALCIUM ALBUM COR SERPL-MCNC: 8.9 MG/DL (ref 8.5–10.5)
CALCIUM SERPL-MCNC: 9.2 MG/DL (ref 8.5–10.5)
CHLORIDE SERPL-SCNC: 106 MMOL/L (ref 96–112)
CHOLEST SERPL-MCNC: 126 MG/DL (ref 100–199)
CO2 SERPL-SCNC: 23 MMOL/L (ref 20–33)
CREAT SERPL-MCNC: 0.92 MG/DL (ref 0.5–1.4)
CREAT UR-MCNC: 71.23 MG/DL
FASTING STATUS PATIENT QL REPORTED: NORMAL
FSH SERPL-ACNC: 8.1 MIU/ML (ref 1.5–12.4)
GFR SERPLBLD CREATININE-BSD FMLA CKD-EPI: 93 ML/MIN/1.73 M 2
GLOBULIN SER CALC-MCNC: 2.2 G/DL (ref 1.9–3.5)
GLUCOSE SERPL-MCNC: 137 MG/DL (ref 65–99)
HDLC SERPL-MCNC: 46 MG/DL
LDLC SERPL CALC-MCNC: 60 MG/DL
LH SERPL-ACNC: 6.4 IU/L (ref 1.7–8.6)
MICROALBUMIN UR-MCNC: <1.2 MG/DL
MICROALBUMIN/CREAT UR: NORMAL MG/G (ref 0–30)
POTASSIUM SERPL-SCNC: 4.7 MMOL/L (ref 3.6–5.5)
PROT SERPL-MCNC: 6.6 G/DL (ref 6–8.2)
SHBG SERPL-SCNC: 38 NMOL/L (ref 19–76)
SODIUM SERPL-SCNC: 142 MMOL/L (ref 135–145)
TESTOST FREE MFR SERPL: 1.7 % (ref 1.6–2.9)
TESTOST FREE SERPL-MCNC: 70 PG/ML (ref 47–244)
TESTOST SERPL-MCNC: 410 NG/DL (ref 300–720)
TRIGL SERPL-MCNC: 98 MG/DL (ref 0–149)

## 2024-06-25 DIAGNOSIS — E11.9 CONTROLLED TYPE 2 DIABETES MELLITUS WITHOUT COMPLICATION, WITHOUT LONG-TERM CURRENT USE OF INSULIN (HCC): ICD-10-CM

## 2024-06-26 ENCOUNTER — TELEPHONE (OUTPATIENT)
Dept: ENDOCRINOLOGY | Facility: MEDICAL CENTER | Age: 64
End: 2024-06-26
Payer: COMMERCIAL

## 2024-06-26 RX ORDER — PIOGLITAZONEHYDROCHLORIDE 30 MG/1
30 TABLET ORAL DAILY
Qty: 90 TABLET | Refills: 3 | Status: SHIPPED | OUTPATIENT
Start: 2024-06-26

## 2024-06-26 RX ORDER — DAPAGLIFLOZIN AND METFORMIN HYDROCHLORIDE 5; 1000 MG/1; MG/1
1 TABLET, FILM COATED, EXTENDED RELEASE ORAL 2 TIMES DAILY
Qty: 180 TABLET | Refills: 3 | Status: SHIPPED | OUTPATIENT
Start: 2024-06-26

## 2024-06-26 NOTE — TELEPHONE ENCOUNTER
Prior Authorization for Xigduo XR 5-1000 er tablets  has been approved for a quantity of 180 , day supply 90    Prior Authorization reference number: [id number]  Insurance: [employer]  Effective dates: 6/26/24-6/26/25  Copay: $15.00     Is patient eligible to fill with [facility]? Yes    Next Steps: The patient's copay exceeds $5.00. Proceed with contacting patient to offer financial assistance.    Thank you,  Alayna Feliciano [facility]  Endocrinology Coordinator   [phone]

## 2024-06-26 NOTE — TELEPHONE ENCOUNTER
Prior Authorization for Xigduo XR 5-1000 er tablets  (Quantity: 180, Days: 90) has been submitted via Cover My Meds: Key ([id number])    Insurance: [employer]    Will follow up in 24-48 business hours.     Thank you,  Alayna Feliciano [facility]  Endocrinology Coordinator   [phone]

## 2024-07-08 ENCOUNTER — APPOINTMENT (OUTPATIENT)
Dept: CARDIOLOGY | Facility: MEDICAL CENTER | Age: 64
End: 2024-07-08
Attending: PHYSICIAN ASSISTANT
Payer: COMMERCIAL

## 2024-08-19 DIAGNOSIS — E11.65 TYPE 2 DIABETES MELLITUS WITH HYPERGLYCEMIA, WITHOUT LONG-TERM CURRENT USE OF INSULIN (HCC): ICD-10-CM

## 2024-08-19 RX ORDER — DULAGLUTIDE 4.5 MG/.5ML
INJECTION, SOLUTION SUBCUTANEOUS
Qty: 12 ML | Refills: 0 | Status: SHIPPED | OUTPATIENT
Start: 2024-08-19 | End: 2024-08-20

## 2024-08-20 DIAGNOSIS — E11.65 TYPE 2 DIABETES MELLITUS WITH HYPERGLYCEMIA, WITHOUT LONG-TERM CURRENT USE OF INSULIN (HCC): ICD-10-CM

## 2024-08-20 RX ORDER — DULAGLUTIDE 4.5 MG/.5ML
4.5 INJECTION, SOLUTION SUBCUTANEOUS
Qty: 6 ML | Refills: 3 | Status: SHIPPED | OUTPATIENT
Start: 2024-08-20

## 2024-08-20 RX ORDER — DULAGLUTIDE 4.5 MG/.5ML
INJECTION, SOLUTION SUBCUTANEOUS
Qty: 12 ML | Refills: 0 | Status: SHIPPED | OUTPATIENT
Start: 2024-08-20 | End: 2024-08-20

## 2024-10-22 ENCOUNTER — TELEPHONE (OUTPATIENT)
Dept: ENDOCRINOLOGY | Facility: MEDICAL CENTER | Age: 64
End: 2024-10-22
Payer: COMMERCIAL

## 2024-11-13 DIAGNOSIS — N52.9 ERECTILE DYSFUNCTION, UNSPECIFIED ERECTILE DYSFUNCTION TYPE: ICD-10-CM

## 2024-11-14 RX ORDER — SILDENAFIL 100 MG/1
TABLET, FILM COATED ORAL
Qty: 30 TABLET | Refills: 2 | Status: SHIPPED | OUTPATIENT
Start: 2024-11-14

## 2024-11-15 DIAGNOSIS — E78.5 DYSLIPIDEMIA ASSOCIATED WITH TYPE 2 DIABETES MELLITUS (HCC): ICD-10-CM

## 2024-11-15 DIAGNOSIS — I25.10 CORONARY ARTERY DISEASE INVOLVING NATIVE CORONARY ARTERY OF NATIVE HEART WITHOUT ANGINA PECTORIS: Chronic | ICD-10-CM

## 2024-11-15 DIAGNOSIS — E11.69 DYSLIPIDEMIA ASSOCIATED WITH TYPE 2 DIABETES MELLITUS (HCC): ICD-10-CM

## 2024-11-15 RX ORDER — ROSUVASTATIN CALCIUM 40 MG/1
40 TABLET, COATED ORAL
Qty: 90 TABLET | Refills: 0 | Status: SHIPPED | OUTPATIENT
Start: 2024-11-15

## 2024-11-15 NOTE — TELEPHONE ENCOUNTER
Is the patient due for a refill? Yes    Was the patient seen the past year? Yes    Date of last office visit: 11.22.2023    Does the patient have an upcoming appointment?  No     Provider to refill:JA    Does the patient have intermediate Plus and need 100-day supply? (This applies to ALL medications) Patient does not have SCP

## 2025-02-13 DIAGNOSIS — E78.5 DYSLIPIDEMIA ASSOCIATED WITH TYPE 2 DIABETES MELLITUS (HCC): ICD-10-CM

## 2025-02-13 DIAGNOSIS — E11.69 DYSLIPIDEMIA ASSOCIATED WITH TYPE 2 DIABETES MELLITUS (HCC): ICD-10-CM

## 2025-02-13 DIAGNOSIS — I25.10 CORONARY ARTERY DISEASE INVOLVING NATIVE CORONARY ARTERY OF NATIVE HEART WITHOUT ANGINA PECTORIS: Chronic | ICD-10-CM

## 2025-02-13 NOTE — TELEPHONE ENCOUNTER
Is the patient due for a refill? Yes    Was the patient seen the last 15 months? NO   Date of last office visit: 11.22.23    Does the patient have an upcoming appointment?  No    Provider to refill:AJ      Does the patient have FDC Plus and need 100-day supply? (This applies to ALL medications) Patient does not have SCP

## 2025-02-14 ENCOUNTER — TELEPHONE (OUTPATIENT)
Dept: CARDIOLOGY | Facility: MEDICAL CENTER | Age: 65
End: 2025-02-14
Payer: COMMERCIAL

## 2025-02-14 RX ORDER — ROSUVASTATIN CALCIUM 40 MG/1
TABLET, COATED ORAL
Qty: 90 TABLET | Refills: 0 | Status: SHIPPED | OUTPATIENT
Start: 2025-02-14

## 2025-02-14 NOTE — TELEPHONE ENCOUNTER
Pt due for annual appt. Pt provided courtesy refill 11/15/2024. Pt was not notified at that time to [facility] an annual visit.   Additional 90 day courtesy refill given to pt but needs to [facility] annual visit for any further refills.     To scheduling, please call pt to [facility] annual visit. ~thank you     Letter drafted and mailed to pt.

## 2025-02-25 ENCOUNTER — TELEPHONE (OUTPATIENT)
Dept: CARDIOLOGY | Facility: MEDICAL CENTER | Age: 65
End: 2025-02-25
Payer: COMMERCIAL

## 2025-02-25 DIAGNOSIS — E11.65 TYPE 2 DIABETES MELLITUS WITH HYPERGLYCEMIA, WITHOUT LONG-TERM CURRENT USE OF INSULIN (HCC): ICD-10-CM

## 2025-02-25 DIAGNOSIS — E55.9 VITAMIN D DEFICIENCY: ICD-10-CM

## 2025-02-25 DIAGNOSIS — E78.5 DYSLIPIDEMIA: ICD-10-CM

## 2025-02-25 NOTE — TELEPHONE ENCOUNTER
CHAVEZ     Caller: Hu Santacruz     Topic/issue: Patient would like to know if he needs labs prior to his appt? If so please place an order     Callback Number:  [phone]    Thank You   Cathi CHAPARRO

## 2025-02-26 ENCOUNTER — HOSPITAL ENCOUNTER (OUTPATIENT)
Dept: LAB | Facility: MEDICAL CENTER | Age: 65
End: 2025-02-26
Attending: INTERNAL MEDICINE
Payer: COMMERCIAL

## 2025-02-26 DIAGNOSIS — E78.5 DYSLIPIDEMIA: ICD-10-CM

## 2025-02-26 DIAGNOSIS — E55.9 VITAMIN D DEFICIENCY: ICD-10-CM

## 2025-02-26 DIAGNOSIS — E11.65 TYPE 2 DIABETES MELLITUS WITH HYPERGLYCEMIA, WITHOUT LONG-TERM CURRENT USE OF INSULIN (HCC): ICD-10-CM

## 2025-02-26 LAB
25(OH)D3 SERPL-MCNC: 84 NG/ML (ref 30–100)
ALBUMIN SERPL BCP-MCNC: 4.4 G/DL (ref 3.2–4.9)
ALBUMIN/GLOB SERPL: 1.8 G/DL
ALP SERPL-CCNC: 51 U/L (ref 30–99)
ALT SERPL-CCNC: 22 U/L (ref 2–50)
ANION GAP SERPL CALC-SCNC: 11 MMOL/L (ref 7–16)
AST SERPL-CCNC: 15 U/L (ref 12–45)
BILIRUB SERPL-MCNC: 0.3 MG/DL (ref 0.1–1.5)
BUN SERPL-MCNC: 16 MG/DL (ref 8–22)
CALCIUM ALBUM COR SERPL-MCNC: 9.2 MG/DL (ref 8.5–10.5)
CALCIUM SERPL-MCNC: 9.5 MG/DL (ref 8.5–10.5)
CHLORIDE SERPL-SCNC: 104 MMOL/L (ref 96–112)
CHOLEST SERPL-MCNC: 167 MG/DL (ref 100–199)
CO2 SERPL-SCNC: 26 MMOL/L (ref 20–33)
CREAT SERPL-MCNC: 0.96 MG/DL (ref 0.5–1.4)
CREAT UR-MCNC: 82 MG/DL
FASTING STATUS PATIENT QL REPORTED: NORMAL
GFR SERPLBLD CREATININE-BSD FMLA CKD-EPI: 88 ML/MIN/1.73 M 2
GLOBULIN SER CALC-MCNC: 2.4 G/DL (ref 1.9–3.5)
GLUCOSE SERPL-MCNC: 148 MG/DL (ref 65–99)
HDLC SERPL-MCNC: 55 MG/DL
LDLC SERPL CALC-MCNC: 91 MG/DL
MICROALBUMIN UR-MCNC: <1.2 MG/DL
MICROALBUMIN/CREAT UR: NORMAL MG/G (ref 0–30)
POTASSIUM SERPL-SCNC: 4.3 MMOL/L (ref 3.6–5.5)
PROT SERPL-MCNC: 6.8 G/DL (ref 6–8.2)
SODIUM SERPL-SCNC: 141 MMOL/L (ref 135–145)
T3FREE SERPL-MCNC: 2.87 PG/ML (ref 2–4.4)
T4 FREE SERPL-MCNC: 1.26 NG/DL (ref 0.93–1.7)
TRIGL SERPL-MCNC: 107 MG/DL (ref 0–149)
TSH SERPL-ACNC: 1.66 UIU/ML (ref 0.35–5.5)

## 2025-02-26 PROCEDURE — 84439 ASSAY OF FREE THYROXINE: CPT

## 2025-02-26 PROCEDURE — 84443 ASSAY THYROID STIM HORMONE: CPT

## 2025-02-26 PROCEDURE — 80061 LIPID PANEL: CPT

## 2025-02-26 PROCEDURE — 80053 COMPREHEN METABOLIC PANEL: CPT

## 2025-02-26 PROCEDURE — 82306 VITAMIN D 25 HYDROXY: CPT

## 2025-02-26 PROCEDURE — 84481 FREE ASSAY (FT-3): CPT

## 2025-02-26 PROCEDURE — 82043 UR ALBUMIN QUANTITATIVE: CPT

## 2025-02-26 PROCEDURE — 82570 ASSAY OF URINE CREATININE: CPT

## 2025-03-04 ENCOUNTER — OFFICE VISIT (OUTPATIENT)
Dept: ENDOCRINOLOGY | Facility: MEDICAL CENTER | Age: 65
End: 2025-03-04
Attending: INTERNAL MEDICINE
Payer: COMMERCIAL

## 2025-03-04 VITALS
BODY MASS INDEX: 24.08 KG/M2 | SYSTOLIC BLOOD PRESSURE: 120 MMHG | HEART RATE: 64 BPM | HEIGHT: 71 IN | DIASTOLIC BLOOD PRESSURE: 50 MMHG | OXYGEN SATURATION: 93 % | WEIGHT: 172 LBS

## 2025-03-04 DIAGNOSIS — Z79.84 LONG TERM (CURRENT) USE OF ORAL HYPOGLYCEMIC DRUGS: ICD-10-CM

## 2025-03-04 DIAGNOSIS — E55.9 VITAMIN D DEFICIENCY: ICD-10-CM

## 2025-03-04 DIAGNOSIS — N52.9 ERECTILE DYSFUNCTION, UNSPECIFIED ERECTILE DYSFUNCTION TYPE: ICD-10-CM

## 2025-03-04 DIAGNOSIS — E11.59 TYPE 2 DIABETES MELLITUS WITH OTHER CIRCULATORY COMPLICATION, WITHOUT LONG-TERM CURRENT USE OF INSULIN (HCC): ICD-10-CM

## 2025-03-04 DIAGNOSIS — E78.5 DYSLIPIDEMIA: ICD-10-CM

## 2025-03-04 PROBLEM — E11.9 CONTROLLED TYPE 2 DIABETES MELLITUS WITHOUT COMPLICATION, WITHOUT LONG-TERM CURRENT USE OF INSULIN (HCC): Status: ACTIVE | Noted: 2025-03-04

## 2025-03-04 LAB
HBA1C MFR BLD: 6 % (ref ?–5.8)
POCT INT CON NEG: NEGATIVE
POCT INT CON POS: POSITIVE

## 2025-03-04 PROCEDURE — 83036 HEMOGLOBIN GLYCOSYLATED A1C: CPT | Performed by: INTERNAL MEDICINE

## 2025-03-04 PROCEDURE — 3074F SYST BP LT 130 MM HG: CPT | Performed by: INTERNAL MEDICINE

## 2025-03-04 PROCEDURE — 99212 OFFICE O/P EST SF 10 MIN: CPT | Performed by: INTERNAL MEDICINE

## 2025-03-04 PROCEDURE — 3078F DIAST BP <80 MM HG: CPT | Performed by: INTERNAL MEDICINE

## 2025-03-04 PROCEDURE — 99214 OFFICE O/P EST MOD 30 MIN: CPT | Performed by: INTERNAL MEDICINE

## 2025-03-04 RX ORDER — DAPAGLIFLOZIN AND METFORMIN HYDROCHLORIDE 5; 1000 MG/1; MG/1
1 TABLET, FILM COATED, EXTENDED RELEASE ORAL 2 TIMES DAILY
Qty: 180 TABLET | Refills: 3 | Status: SHIPPED | OUTPATIENT
Start: 2025-03-04

## 2025-03-04 RX ORDER — SILDENAFIL 100 MG/1
TABLET, FILM COATED ORAL
Qty: 30 TABLET | Refills: 2 | Status: SHIPPED | OUTPATIENT
Start: 2025-03-04

## 2025-03-04 RX ORDER — PIOGLITAZONE 30 MG/1
30 TABLET ORAL DAILY
Qty: 90 TABLET | Refills: 3 | Status: SHIPPED | OUTPATIENT
Start: 2025-03-04 | End: 2025-03-04 | Stop reason: SDUPTHER

## 2025-03-04 RX ORDER — DAPAGLIFLOZIN AND METFORMIN HYDROCHLORIDE 5; 1000 MG/1; MG/1
1 TABLET, FILM COATED, EXTENDED RELEASE ORAL 2 TIMES DAILY
Qty: 180 TABLET | Refills: 3 | Status: SHIPPED | OUTPATIENT
Start: 2025-03-04 | End: 2025-03-04 | Stop reason: SDUPTHER

## 2025-03-04 RX ORDER — PIOGLITAZONE 30 MG/1
30 TABLET ORAL DAILY
Qty: 90 TABLET | Refills: 3 | Status: SHIPPED | OUTPATIENT
Start: 2025-03-04

## 2025-03-04 RX ORDER — ROSUVASTATIN CALCIUM 40 MG/1
40 TABLET, COATED ORAL DAILY
Qty: 90 TABLET | Refills: 3 | Status: SHIPPED | OUTPATIENT
Start: 2025-03-04

## 2025-03-05 NOTE — PROGRESS NOTES
CHIEF COMPLAINT: Patient is here for follow up of Type 2 Diabetes Mellitus.     HPI:     Hu Santacruz is a 64 y.o. male with Type 2 Diabetes Mellitus here for follow up.    Labs from 3/4/2025 show A1c is 6.0%  Labs from 5/30/2024 show a1c is 6.2%  Labs from 11/27/2023 show a1c was 7.2%  Labs from 8/24/2023 show a1c was 7.8%  Labs from 7/7/2022 show a1c was 6.5%  Labs from 2/18/2022 show a1c was 6.7%  Labs from 10/26/2021 show a1c was 6.5%  Labs from 11/4/2020 show a1c was 6.4%      He has a hx of CAD with prior PCI then he had a CABG (LIMA-LAD, reverse SVG-D2 and PDA)      On follow up he is taking   Xigduo XR 5/1000mg twice a day,   Pioglitazone 30mg daily, and   Trulicity 4.5mg weekly      He denies SE with his meds   He has not done his Echo  He denies leg edema and exertional dyspnea         He has hyperlipidemia and established CAD  He is taking generic crestor 40mg daily  He reports missing doses of this medication   Latest Reference Range & Units 02/26/25 06:18   Cholesterol,Tot 100 - 199 mg/dL 167   Triglycerides 0 - 149 mg/dL 107   HDL >=40 mg/dL 55   LDL <100 mg/dL 91         He doesn't have diabetic kidney disease  BP is at goal    Latest Reference Range & Units 02/26/25 06:18   Micro Alb Creat Ratio 0 - 30 mg/g see below   Creatinine, Urine mg/dL 82.00   Microalbumin, Urine Random mg/dL <1.2       He doesn't have diabetic retinopathy  He is due for an eye exam       He denies foot sores  He denies numbness and pain and tingling in his feet      Interestingly previous labs on 3/20/2023 showed low morning testosterone levels of 146 with normal LH and FSH levels.  Prolactin was normal at 8.7.  SHBG was 34.6.  He claims that the lab test was drawn at 8 AM.  He denies symptoms of hypogonadism such as decreased libido, gynecomastia, hot flashes,.  He does have a history of ED.      I ordered testosterone labs again and repeat total testosterone was 410 on 6/2024      BG Diary:  patient did not bring  meter    Weight has been stable    Diabetes Complications   Retinopathy: No known retinopathy.  Last eye exam:   We are getting an eye exam today    Neuropathy: Denies paresthesias or numbness in hands or feet. Denies any foot wounds.  Exercise: Minimal.  Diet: Fair.  Patient's medications, allergies, and social histories were reviewed and updated as appropriate.    ROS:     CONS:     No fever, no chills   EYES:     No diplopia, no blurry vision   CV:           No chest pain, no palpitations   PULM:     No SOB, no cough, no hemoptysis.   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  2013-04: Calcifying tendinitis of shoulder  2013-04: Bicipital tenosynovitis  2013-04: Rotator cuff (capsule) sprain  2012-04: Adjustment disorder  2012-04: CAD (coronary artery disease), native coronary artery  2012-04: S/P coronary artery stent placement  2012-04: Type 2 diabetes mellitus with hyperglycemia, without long-  term current use of insulin ([facility])  2012-04: Dyslipidemia  2012-04: Overweight  2012-04: Old myocardial infarction      Past Surgical History:  Past Surgical History:   Procedure Laterality Date    MULTIPLE CORONARY ARTERY BYPASS ENDO VEIN HARVEST  12/6/2016    Procedure: MULTIPLE CORONARY ARTERY BYPASS ENDO VEIN HARVEST X3 WITH LAITH;  Surgeon: Juan Pablo Schmitz M.D.;  Location: SURGERY [facility];  Service:     SHOULDER ARTHROSCOPY W/ ROTATOR CUFF REPAIR  4/2/2013    Performed by Angelique Romero M.D. at [facility]  "   SHOULDER DECOMPRESSION ARTHROSCOPIC  4/2/2013    Performed by Angelique Romero M.D. at SURGERY [facility]S    JOINT INJECTION DIAGNOSTIC  4/2/2013    Performed by Angelique Romero M.D. at SURGERY [facility] ORS    CLAVICLE DISTAL EXCISION  4/2/2013    Performed by Angelique Romero M.D. at [facility] OR[facility]    SHOULDER ARTHROSCOPY W/ BICIPITAL TENODESIS REPAIR  4/2/2013    Performed by Angelique Romero M.D. at [facility]    STENT PLACEMENT  2008    x3    DENTAL EXTRACTION(S)  1997    wisdom teeth    KNEE ARTHROSCOPY  1983    left    ORIF, PATELLA  1980    bilateral    ZZZ CARDIAC CATH          Allergies:  Metoprolol     Social History:  Social History     Tobacco Use    Smoking status: Never    Smokeless tobacco: Never   Vaping Use    Vaping status: Never Used   Substance Use Topics    Alcohol use: Not Currently     Comment: 1-2 drinks/mo    Drug use: No     Comment: Former Marijuana usage        Family History:   family history is not on file. He was adopted.      PHYSICAL EXAM:   OBJECTIVE:  Vital signs: /50 (BP Location: Left arm, Patient Position: Sitting, BP Cuff Size: Adult long)   Pulse 64   Ht 1.803 m (5' 11\")   Wt 78 kg (172 lb)   SpO2 93%   BMI 23.99 kg/m²   GENERAL: Well-developed, well-nourished in no apparent distress.   EYE:  No ocular asymmetry, PERRLA  HENT: Pink, moist mucous membranes.    NECK: No thyromegaly.   CARDIOVASCULAR:  No murmurs  LUNGS: Clear breath sounds  ABDOMEN: Soft, nontender   EXTREMITIES: No clubbing, cyanosis, or edema.   NEUROLOGICAL: No gross focal motor abnormalities   LYMPH: No cervical adenopathy palpated  SKIN: No rashes, lesions.       Labs:  Lab Results   Component Value Date/Time    HBA1C 6.0 (A) 03/04/2025 04:06 PM        Lab Results   Component Value Date/Time    WBC 7.0 02/18/2023 04:40 PM    RBC 4.67 (L) 02/18/2023 04:40 PM    HEMOGLOBIN 15.0 02/18/2023 04:40 PM    MCV 92.1 02/18/2023 04:40 PM    MCH 32.1 02/18/2023 " 04:40 PM    MCHC 34.9 02/18/2023 04:40 PM    RDW 43.5 02/18/2023 04:40 PM    MPV 8.8 (L) 02/18/2023 04:40 PM       Lab Results   Component Value Date/Time    SODIUM 141 02/26/2025 06:18 AM    POTASSIUM 4.3 02/26/2025 06:18 AM    CHLORIDE 104 02/26/2025 06:18 AM    CO2 26 02/26/2025 06:18 AM    ANION 11.0 02/26/2025 06:18 AM    GLUCOSE 148 (H) 02/26/2025 06:18 AM    BUN 16 02/26/2025 06:18 AM    CREATININE 0.96 02/26/2025 06:18 AM    CALCIUM 9.5 02/26/2025 06:18 AM    ASTSGOT 15 02/26/2025 06:18 AM    ALTSGPT 22 02/26/2025 06:18 AM    TBILIRUBIN 0.3 02/26/2025 06:18 AM    ALBUMIN 4.4 02/26/2025 06:18 AM    TOTPROTEIN 6.8 02/26/2025 06:18 AM    GLOBULIN 2.4 02/26/2025 06:18 AM    AGRATIO 1.8 02/26/2025 06:18 AM       Lab Results   Component Value Date/Time    CHOLSTRLTOT 142 11/04/2020 0431    TRIGLYCERIDE 57 11/04/2020 0431    HDL 55 11/04/2020 0431    LDL 87 03/13/2020 0443       Lab Results   Component Value Date/Time    MALBCRT see below 02/26/2025 06:18 AM    MICROALBUR <1.2 02/26/2025 06:18 AM    MICRALB <3.0 08/23/2023 04:49 AM        Lab Results   Component Value Date/Time    TSHULTRASEN 0.990 04/02/2019 0714     No results found for: FREEDIR  No results found for: FREET3  No results found for: THYSTIMIG        ASSESSMENT/PLAN:     1. Type 2 diabetes mellitus with hyperglycemia, without long-term current use of insulin (HCC)  Improved   control A1c is down to 6.0%  Continue metformin  Continue Trulicity  Continue Xigduo XR  I want him to get updated labs to check his CMP lipids and urine albumin I am also checking a proBNP with his next labs  Follow-up with Catina in 6 months      2. Dyslipidemia  Unstable  LDL-C is not at goal  (goal < 55)  Continue crestor but improve compliance    Repeat lipids in 6 mos    3. Vitamin D deficiency  Will check calcium vitamin D levels this week      4. Erectile dysfunction, unspecified erectile dysfunction type  Stable continue sildenafil    5. Long term (current) use of oral  hypoglycemic drugs  Patient is oral agents for t2dm        Return in about 6 months (around 9/4/2025).        Thank you kindly for allowing me to participate in the diabetes care plan for this patient.    Tone Moreno MD, FACE, [facility]      CC:   Kristen Mcgovern P.A.-C.

## 2025-03-05 NOTE — Clinical Note
REFERRAL APPROVAL NOTICE         Sent on March 5, 2025                   Hu Coates Noam  [address] [place] [address]                   Dear Mr. Santacruz,    After a careful review of the medical information and benefit coverage, [facility] has processed your referral. See below for additional details.    If applicable, you must be actively enrolled with your insurance for coverage of the authorized service. If you have any questions regarding your coverage, please contact your insurance directly.    REFERRAL INFORMATION   Referral #:  [id number]  Referred-To Department    Referred-By Provider:  Ghassan Moreno M.D.   Endocrinology [facility]      [address]  [address]  [place] [address] [phone] [address]  [place] [id number] [phone]    Referral Start Date:  03/04/2025  Referral End Date:   03/04/2026           SCHEDULING  If you do not already have an appointment, please call [phone] to make an appointment.   MORE INFORMATION  As a reminder, [facility] ownership has changed, meaning this location is now owned and operated by [facility]. As such, we want to clarify that our patients should expect to receive two separate bills for the services received at [facility] - one representing the [facility] facility fees as the owner of the establishment, and the other to represent the physician's services and subsequent fees. You can speak with your insurance carrier for a pricing estimate by calling the customer service number on the back of your card and ask about charges for a hospital outpatient visit.  If you do not already have a [employer]iance [employer] account, sign up at: [email or web address]  You can access your medical information, make appointments, see lab results, billing information, and more.  If you have questions regarding this referral, please contact  the [facility] Referrals department at:              [phone]. Monday - Friday 7:30AM - 5:00PM.      Sincerely,  [facility]

## 2025-03-10 NOTE — PROGRESS NOTES
Chief Complaint   Patient presents with    Dyslipidemia     FV Dx: Dyslipidemia associated with type 2 diabetes mellitus (HCC    Coronary Artery Disease          Subjective:   Hu Santacruz is a 64 y.o. male who presents today for follow-up.    Patient of Dr. Faye.  Current medical problems include coronary artery disease s/p CABG in 2016 (LIMA-LAD, reverse SVG-D2 and PDA), type 2 diabetes, dyslipidemia.  Last visit clinic visit with me was November 2023.      Hu denies cardiac complaints. With regards to his elevated LDL this month he realized he was missing rosuvastatin for a few weeks in his pill box. He has since added it back to the pill box. He is not taking aspirin.     His dog is going for ACL repair this week. He was unable to get the echo due to cost.     Past Medical History:   Diagnosis Date    Adjustment disorder 4/16/2012    Back pain     CAD (coronary artery disease), native coronary artery 4/16/2012    CABG X3 (LIMA-LAD, SVG-2nd diag and R-PDA)-12/2016    Cold 2/21/13    DM (diabetes mellitus) ([facility]) 2008    oral agent    Hyperlipidemia 4/16/2012    Muscle disorder     Old myocardial infarction 2008    cardiologist; Dr. Olmstead    Pain     right shoulder    Situational anxiety 10/11/2016    Snoring          Family History   Adopted: Yes   Problem Relation Age of Onset    Heart Attack Neg Hx     Heart Disease Neg Hx          Social History     Tobacco Use    Smoking status: Never    Smokeless tobacco: Never   Vaping Use    Vaping status: Never Used   Substance Use Topics    Alcohol use: Not Currently     Comment: 1-2 drinks/mo    Drug use: Not Currently     Comment: Former Marijuana usage         Allergies   Allergen Reactions    Metoprolol          Current Outpatient Medications   Medication Sig    rosuvastatin (CRESTOR) 40 MG tablet Take 1 Tablet by mouth every day.    pioglitazone (ACTOS) 30 MG Tab Take 1 Tablet by mouth every day.    sildenafil citrate (VIAGRA) 100 MG tablet TAKE 1 TABLET  "BY MOUTH DAILY AS NEEDED FOR ERECTILE DYSFUNCTION    XIGDUO XR 5-1000 MG TABLET SR 24 HR Take 1 Tablet by mouth 2 times a day.    Dulaglutide (TRULICITY) 4.5 MG/0.5ML Solution Pen-injector Inject 4.5 mg under the skin every 7 days.    aspirin EC (ECOTRIN) 81 MG Tablet Delayed Response Take 81 mg by mouth every day.    vitamin D (CHOLECALCIFEROL) 1000 UNIT TABS Take 1,000 Units by mouth 2 times a day.           Review of Systems   Respiratory:  Negative for cough and shortness of breath.    Cardiovascular:  Negative for chest pain, palpitations, orthopnea, leg swelling and PND.   Neurological:  Negative for dizziness and loss of consciousness.          Objective:   /54 (BP Location: Left arm, Patient Position: Sitting, BP Cuff Size: Adult)   Pulse 76   Resp 16   Ht 1.803 m (5' 11\")   Wt 76.2 kg (168 lb)   SpO2 100%  Body mass index is 23.43 kg/m².         Physical Exam  Vitals reviewed.   HENT:      Head: Normocephalic and atraumatic.   Cardiovascular:      Rate and Rhythm: Normal rate and regular rhythm.      Heart sounds: No murmur heard.  Pulmonary:      Effort: Pulmonary effort is normal. No respiratory distress.      Breath sounds: No rales.   Musculoskeletal:      Right lower leg: No edema.      Left lower leg: No edema.   Skin:     General: Skin is warm.   Neurological:      General: No focal deficit present.      Mental Status: He is alert.      Gait: Gait normal.   Psychiatric:         Judgment: Judgment normal.            Assessment:     1. Dyslipidemia  Lipid Profile      2. Coronary artery disease involving native coronary artery of native heart without angina pectoris  Lipid Profile      3. Ischemic cardiomyopathy        4. Dyslipidemia associated with type 2 diabetes mellitus (HCC)               Medical Decision Making:  Today's Assessment / Plan:   Ischemic Heart Disease without angina  Dyslipidemia  - S/P CABG (LIMA-LAD, reverse SVG-D2 and PDA) 2016  - cont aspirin  -Had been on " rosuvastatin for an unknown time which explains the LDL of 90. He has since restarted and we'll repeat lipids in 2-3 months to confirm LDL ~55.     DM type 2, controlled  -Continues to work with his endocrinology team.  He is not on an ACE inhibitor however he has no hypertension and no microalbuminuria   therefore I will not add on an ACE/ARB.    Ischemic cardiomyopathy  - no HF  -His EF was about 45% at the time of his bypass surgery.  He has not had a repeat ultrasound since then.  I would like him to get a new baseline, if his EF remains mildly reduced I would add on an ACE/ARB and beta-blocker. At this point he is unable to afford imaging.       No follow-ups on file.

## 2025-03-11 ENCOUNTER — OFFICE VISIT (OUTPATIENT)
Dept: CARDIOLOGY | Facility: MEDICAL CENTER | Age: 65
End: 2025-03-11
Attending: PHYSICIAN ASSISTANT
Payer: COMMERCIAL

## 2025-03-11 VITALS
SYSTOLIC BLOOD PRESSURE: 104 MMHG | WEIGHT: 168 LBS | BODY MASS INDEX: 23.52 KG/M2 | OXYGEN SATURATION: 100 % | DIASTOLIC BLOOD PRESSURE: 54 MMHG | HEIGHT: 71 IN | RESPIRATION RATE: 16 BRPM | HEART RATE: 76 BPM

## 2025-03-11 DIAGNOSIS — E11.69 DYSLIPIDEMIA ASSOCIATED WITH TYPE 2 DIABETES MELLITUS (HCC): ICD-10-CM

## 2025-03-11 DIAGNOSIS — I25.5 ISCHEMIC CARDIOMYOPATHY: ICD-10-CM

## 2025-03-11 DIAGNOSIS — E78.5 DYSLIPIDEMIA: ICD-10-CM

## 2025-03-11 DIAGNOSIS — I25.10 CORONARY ARTERY DISEASE INVOLVING NATIVE CORONARY ARTERY OF NATIVE HEART WITHOUT ANGINA PECTORIS: Chronic | ICD-10-CM

## 2025-03-11 DIAGNOSIS — E78.5 DYSLIPIDEMIA ASSOCIATED WITH TYPE 2 DIABETES MELLITUS (HCC): ICD-10-CM

## 2025-03-11 PROCEDURE — 99213 OFFICE O/P EST LOW 20 MIN: CPT | Performed by: PHYSICIAN ASSISTANT

## 2025-03-11 PROCEDURE — 99212 OFFICE O/P EST SF 10 MIN: CPT | Performed by: PHYSICIAN ASSISTANT

## 2025-03-11 ASSESSMENT — ENCOUNTER SYMPTOMS
DIZZINESS: 0
PALPITATIONS: 0
COUGH: 0
ORTHOPNEA: 0
SHORTNESS OF BREATH: 0
PND: 0
LOSS OF CONSCIOUSNESS: 0

## 2025-06-27 ENCOUNTER — TELEPHONE (OUTPATIENT)
Dept: ENDOCRINOLOGY | Facility: MEDICAL CENTER | Age: 65
End: 2025-06-27
Payer: COMMERCIAL

## 2025-06-27 NOTE — TELEPHONE ENCOUNTER
Received Renewal PA request for Xigduo XR 5-1000mg Tablet. (Quantity:60, Day Supply:30)  Insurance: [employer]  Member ID: [id number]  BIN: [id number]  PCN: [id number]  Group: [id number]   Ran Test claim via [place] & medication Rejects stating prior authorization is required.    Prior Authorization has been submitted via Cover My Meds: Key ([id number])    Prior Authorization has been APPROVED  Prior Authorization reference number: [id number]  Effective dates: 6/27/25-6/27/26  Copay: $25  Is patient eligible to fill with [facility]? Yes  Next Steps: Patient is currently refilling prescription at preferred pharmacy ([id number]).     Thank you,  Bethany Root [facility]  Endocrinology Pharmacy Coordinator

## 2025-07-16 ENCOUNTER — OFFICE VISIT (OUTPATIENT)
Dept: MEDICAL GROUP | Facility: LAB | Age: 65
End: 2025-07-16
Payer: COMMERCIAL

## 2025-07-16 VITALS
DIASTOLIC BLOOD PRESSURE: 60 MMHG | OXYGEN SATURATION: 98 % | TEMPERATURE: 97 F | HEART RATE: 73 BPM | WEIGHT: 168.87 LBS | BODY MASS INDEX: 23.64 KG/M2 | HEIGHT: 71 IN | RESPIRATION RATE: 16 BRPM | SYSTOLIC BLOOD PRESSURE: 120 MMHG

## 2025-07-16 DIAGNOSIS — Z63.6 CAREGIVER STRESS: Primary | ICD-10-CM

## 2025-07-16 DIAGNOSIS — Z13.1 DIABETES MELLITUS SCREENING: ICD-10-CM

## 2025-07-16 DIAGNOSIS — Z13.220 LIPID SCREENING: ICD-10-CM

## 2025-07-16 DIAGNOSIS — Z12.11 COLON CANCER SCREENING: ICD-10-CM

## 2025-07-16 DIAGNOSIS — Z13.29 SCREENING FOR THYROID DISORDER: ICD-10-CM

## 2025-07-16 DIAGNOSIS — Z12.5 PROSTATE CANCER SCREENING: ICD-10-CM

## 2025-07-16 PROCEDURE — 99214 OFFICE O/P EST MOD 30 MIN: CPT | Performed by: PHYSICIAN ASSISTANT

## 2025-07-16 PROCEDURE — 3078F DIAST BP <80 MM HG: CPT | Performed by: PHYSICIAN ASSISTANT

## 2025-07-16 PROCEDURE — 3074F SYST BP LT 130 MM HG: CPT | Performed by: PHYSICIAN ASSISTANT

## 2025-07-16 RX ORDER — ALPRAZOLAM 0.25 MG
0.25 TABLET ORAL
Qty: 30 TABLET | Refills: 0 | Status: SHIPPED | OUTPATIENT
Start: 2025-08-15 | End: 2025-09-14

## 2025-07-16 RX ORDER — ALPRAZOLAM 0.25 MG
0.25 TABLET ORAL
Qty: 30 TABLET | Refills: 0 | Status: SHIPPED | OUTPATIENT
Start: 2025-07-16 | End: 2025-08-15

## 2025-07-16 SDOH — SOCIAL STABILITY - SOCIAL INSECURITY: DEPENDENT RELATIVE NEEDING CARE AT HOME: Z63.6

## 2025-07-16 ASSESSMENT — ANXIETY QUESTIONNAIRES
5. BEING SO RESTLESS THAT IT IS HARD TO SIT STILL: NOT AT ALL
GAD7 TOTAL SCORE: 3
1. FEELING NERVOUS, ANXIOUS, OR ON EDGE: SEVERAL DAYS
4. TROUBLE RELAXING: NOT AT ALL
3. WORRYING TOO MUCH ABOUT DIFFERENT THINGS: SEVERAL DAYS
IF YOU CHECKED OFF ANY PROBLEMS ON THIS QUESTIONNAIRE, HOW DIFFICULT HAVE THESE PROBLEMS MADE IT FOR YOU TO DO YOUR WORK, TAKE CARE OF THINGS AT HOME, OR GET ALONG WITH OTHER PEOPLE: SOMEWHAT DIFFICULT
6. BECOMING EASILY ANNOYED OR IRRITABLE: NOT AT ALL
7. FEELING AFRAID AS IF SOMETHING AWFUL MIGHT HAPPEN: NOT AT ALL
2. NOT BEING ABLE TO STOP OR CONTROL WORRYING: SEVERAL DAYS

## 2025-07-16 ASSESSMENT — PATIENT HEALTH QUESTIONNAIRE - PHQ9: CLINICAL INTERPRETATION OF PHQ2 SCORE: 0

## 2025-07-16 NOTE — PROGRESS NOTES
Subjective:     CC: caregiver stress    HPI:   Hu here today with   Verbal consent was acquired by the patient to use [email or web address] [email or web address] ambient listening note generation during this visit Yes     History of Present Illness  The patient presents for caregiver stress, type 2 diabetes, and health maintenance.    Caregiver Stress  - Experiencing significant stress due to caregiving for mother-in-law with Alzheimer's disease and father-in-law with pneumonia and vertigo.  - Mother-in-law bedridden for three days without eating.  - Father-in-law found face down in grass while walking dog.  - Both in-laws now living with them, adding to stress.  - Dog requires major surgery, another stressor.  - Feels overwhelmed and tense.  - Managed stress with Xanax, prescribed by cardiologist in 2022, but recently ran out and seeks refill.  - No changes in sleep patterns or appetite.  - Lack of interest in activities, such as traveling to [place] with spouse.  - Unsure how long living situation will continue.  - Took one Xanax last night, felt better, but wound up again this morning.  - Considering continuing Xanax and open to other options.    Health Maintenance  - Overdue for PSA test and colonoscopy since moving from [place] in 2004.  - Needs eye exam, plans to schedule soon.    Type 2 Diabetes  - Reports not eating well, often skipping breakfast, eating late lunch and dinner, with snacks in between.  - Spouse noticed irritability when not eating.    Diet: Not eating well, often skipping breakfast, eating late lunch and dinner, with snacks in between.    Sleep: Sleeping well.    Living Condition: Living with spouse and both in-laws, who require significant care.         7/16/2025     8:21 AM    COLEEN-7 ANXIETY SCALE FLOWSHEET   Feeling nervous, anxious, or on edge 1   Not being able to stop or control worrying 1   Worrying too much about different things 1   Trouble relaxing 0   Being so restless that it is hard to sit still 0   Becoming  "easily annoyed or irritable 0   Feeling afraid as if something awful might happen 0   COLEEN-7 Total Score 3   How difficult have these problems made it for you to do your work, take care of things at home, or get along with other people? Somewhat difficult       Interpretation of COLEEN-7 Total Score   Score Severity   0-4 Minimal Anxiety  5-9 Mild Anxiety   10-14 Moderate Anxiety  15-21 Severy Anxiety        7/16/2025     8:20 AM 2/13/2023     8:00 AM 2/18/2022     4:20 PM 10/26/2021     7:30 AM 10/20/2020     8:00 AM   PHQ-9 Screening   Little interest or pleasure in doing things 0 - not at all 0 - not at all 0 - not at all 0 - not at all 0 - not at all   Feeling down, depressed, or hopeless 0 - not at all 0 - not at all 0 - not at all 0 - not at all 0 - not at all   PHQ-2 Total Score 0 0 0 0 0       Interpretation of PHQ-9 Total Score   Score Severity   1-4 No Depression   5-9 Mild Depression   10-14 Moderate Depression   15-19 Moderately Severe Depression   20-27 Severe Depression      ROS:  All ROS negative except for pertinent positives listed above.       Current Medications and Prescriptions Ordered in Epic[1]      Objective:     Exam:  /60 (BP Location: Left arm, Patient Position: Sitting, BP Cuff Size: Adult)   Pulse 73   Temp 36.1 °C (97 °F) (Temporal)   Resp 16   Ht 1.803 m (5' 11\")   Wt 76.6 kg (168 lb 14 oz)   SpO2 98%   BMI 23.55 kg/m²  Body mass index is 23.55 kg/m².    Constitutional: Alert, no distress, well-groomed.  Skin: Warm, dry, good turgor, no rashes in visible areas.  Eye: Equal, round and reactive, conjunctiva clear, lids normal.  ENMT: Lips without lesions, good dentition, moist mucous membranes.  Neck: Trachea midline, no masses, no thyromegaly.  Respiratory: Unlabored respiratory effort, no cough.  MSK: Normal gait, moves all extremities.  Neuro: Grossly non-focal.   Psych: Alert and oriented x3, normal affect and mood.        Assessment & Plan:     65 y.o. male with the " following -     1. Caregiver stress  Referral to Behavioral Health    ALPRAZolam (XANAX) 0.25 MG Tab    ALPRAZolam (XANAX) 0.25 MG Tab      2. Lipid screening  CBC WITH DIFFERENTIAL    Comp Metabolic Panel    Lipid Profile      3. Screening for thyroid disorder  TSH WITH REFLEX TO FT4      4. Diabetes mellitus screening        5. Prostate cancer screening  PROSTATE SPECIFIC AG SCREENING      6. Colon cancer screening  Referral to GI for Colonoscopy          Assessment & Plan  Caregiver stress  Symptoms indicative of caregiver stress, exacerbated by current living situation.  Treatment plan: Prescription for Xanax 0.25 mg, 30 tablets with one refill, provided for anxiety and stress. Discussed potential benefits of counseling/therapy.  PDMP reviewed.  Patient understands uses medication sparingly.  We discussed that he finds that he is using it most days this is likely an indication to explore other treatment options such as SSRIs  Clinical decision making: Advised to monitor usage and consider daily medication options if usage becomes frequent.    Health maintenance  Due for colonoscopy and PSA test.  Diagnostic plan: Referral for colonoscopy made; will be contacted via [place] to schedule. Lab orders placed.          I spent a total of 22 minutes with record review, exam, communication with the patient, communication with other providers, and documentation of this encounter.      No follow-ups on file.    Please note that this dictation was created using voice recognition software. I have made every reasonable attempt to correct obvious errors, but there may be errors of grammar and possibly content that I did not discover before finalizing the note.             [1]   Current Outpatient Medications Ordered in Epic   Medication Sig Dispense Refill    rosuvastatin (CRESTOR) 40 MG tablet Take 1 Tablet by mouth every day. 90 Tablet 3    pioglitazone (ACTOS) 30 MG Tab Take 1 Tablet by mouth every day. 90 Tablet 3     sildenafil citrate (VIAGRA) 100 MG tablet TAKE 1 TABLET BY MOUTH DAILY AS NEEDED FOR ERECTILE DYSFUNCTION 30 Tablet 2    XIGDUO XR 5-1000 MG TABLET SR 24 HR Take 1 Tablet by mouth 2 times a day. 180 Tablet 3    Dulaglutide (TRULICITY) 4.5 MG/0.5ML Solution Pen-injector Inject 4.5 mg under the skin every 7 days. 6 mL 3    aspirin EC (ECOTRIN) 81 MG Tablet Delayed Response Take 81 mg by mouth every day.      vitamin D (CHOLECALCIFEROL) 1000 UNIT TABS Take 1,000 Units by mouth 2 times a day.         No current Epic-ordered facility-administered medications on file.

## 2025-07-22 NOTE — Clinical Note
REFERRAL APPROVAL NOTICE         Sent on July 22, 2025                   Hu Coates McMhilaria  [address]  [facility] [address]                   Dear Mr. Santacruz,    After a careful review of the medical information and benefit coverage, [facility] has processed your referral. See below for additional details.    If applicable, you must be actively enrolled with your insurance for coverage of the authorized service. If you have any questions regarding your coverage, please contact your insurance directly.    REFERRAL INFORMATION   Referral #:  [id number]  Referred-To Provider    Referred-By Provider:  Behavioral Health    Kristen Mcgovern P.A.-C.   [facility]      [address]  [address]  [facility] [address]  [phone] [id number]  [facility] [address]  [phone]    Referral Start Date:  07/16/2025  Referral End Date:   07/16/2026             SCHEDULING  If you do not already have an appointment, please call [phone] to make an appointment.     MORE INFORMATION  If you do not already have a [employer] account, sign up at: [email or web address]  You can access your medical information, make appointments, see lab results, billing information, and more.  If you have questions regarding this referral, please contact  the [facility] Referrals department at:             [phone]. Monday - Friday 8:00AM - 5:00PM.     Sincerely,    [facility]

## 2025-07-22 NOTE — Clinical Note
REFERRAL APPROVAL NOTICE         Sent on July 22, 2025                   Hu Gardnerdirk  [address] [facility] [address]                   Dear Mr. Santacruz,    After a careful review of the medical information and benefit coverage, [facility] has processed your referral. See below for additional details.    If applicable, you must be actively enrolled with your insurance for coverage of the authorized service. If you have any questions regarding your coverage, please contact your insurance directly.    REFERRAL INFORMATION   Referral #:  [id number]  Referred-To Provider    Referred-By Provider:  Gastroenterology    Kristen Mcgovern P.A.-C.   [facility]      [address]  [address] [address] [phone] [address] [facility] [address]  [phone]    Referral Start Date:  07/16/2025  Referral End Date:   07/16/2026             SCHEDULING  If you do not already have an appointment, please call [phone] to make an appointment.     MORE INFORMATION  If you do not already have a [employer] account, sign up at: [email or web address]  You can access your medical information, make appointments, see lab results, billing information, and more.  If you have questions regarding this referral, please contact  the [facility] Referrals department at:             [phone]. Monday - Friday 8:00AM - 5:00PM.     Sincerely,    [facility]

## 2025-07-28 DIAGNOSIS — E11.65 TYPE 2 DIABETES MELLITUS WITH HYPERGLYCEMIA, WITHOUT LONG-TERM CURRENT USE OF INSULIN (HCC): ICD-10-CM

## 2025-07-28 RX ORDER — DULAGLUTIDE 4.5 MG/.5ML
INJECTION, SOLUTION SUBCUTANEOUS
Qty: 12 ML | Refills: 0 | Status: SHIPPED | OUTPATIENT
Start: 2025-07-28